# Patient Record
Sex: FEMALE | Race: WHITE | NOT HISPANIC OR LATINO | Employment: OTHER | URBAN - METROPOLITAN AREA
[De-identification: names, ages, dates, MRNs, and addresses within clinical notes are randomized per-mention and may not be internally consistent; named-entity substitution may affect disease eponyms.]

---

## 2020-02-10 ENCOUNTER — RECORDS - HEALTHEAST (OUTPATIENT)
Dept: LAB | Facility: CLINIC | Age: 75
End: 2020-02-10

## 2020-02-12 LAB — BACTERIA SPEC CULT: NORMAL

## 2021-02-27 ENCOUNTER — COMMUNICATION - HEALTHEAST (OUTPATIENT)
Dept: SCHEDULING | Facility: CLINIC | Age: 76
End: 2021-02-27

## 2021-05-25 ENCOUNTER — RECORDS - HEALTHEAST (OUTPATIENT)
Dept: ADMINISTRATIVE | Facility: CLINIC | Age: 76
End: 2021-05-25

## 2021-06-16 PROBLEM — R42 VERTIGO: Status: ACTIVE | Noted: 2021-02-26

## 2022-05-13 ENCOUNTER — HOSPITAL ENCOUNTER (OUTPATIENT)
Dept: ULTRASOUND IMAGING | Facility: CLINIC | Age: 77
Discharge: HOME | End: 2022-05-13
Attending: FAMILY MEDICINE | Admitting: FAMILY MEDICINE
Payer: COMMERCIAL

## 2022-05-13 DIAGNOSIS — M79.605 LEG PAIN, POSTERIOR, LEFT: ICD-10-CM

## 2022-05-13 PROCEDURE — 93971 EXTREMITY STUDY: CPT | Mod: LT

## 2022-10-27 ENCOUNTER — LAB REQUISITION (OUTPATIENT)
Dept: LAB | Facility: CLINIC | Age: 77
End: 2022-10-27
Payer: COMMERCIAL

## 2022-10-27 DIAGNOSIS — Z01.419 ENCOUNTER FOR GYNECOLOGICAL EXAMINATION (GENERAL) (ROUTINE) WITHOUT ABNORMAL FINDINGS: ICD-10-CM

## 2022-10-27 LAB
ALBUMIN SERPL BCG-MCNC: 4.7 G/DL (ref 3.5–5.2)
ALP SERPL-CCNC: 95 U/L (ref 35–104)
ALT SERPL W P-5'-P-CCNC: 19 U/L (ref 10–35)
ANION GAP SERPL CALCULATED.3IONS-SCNC: 14 MMOL/L (ref 7–15)
AST SERPL W P-5'-P-CCNC: 23 U/L (ref 10–35)
BILIRUB SERPL-MCNC: 0.3 MG/DL
BUN SERPL-MCNC: 19.4 MG/DL (ref 8–23)
CALCIUM SERPL-MCNC: 9.7 MG/DL (ref 8.8–10.2)
CHLORIDE SERPL-SCNC: 104 MMOL/L (ref 98–107)
CREAT SERPL-MCNC: 0.71 MG/DL (ref 0.51–0.95)
DEPRECATED HCO3 PLAS-SCNC: 23 MMOL/L (ref 22–29)
ERYTHROCYTE [DISTWIDTH] IN BLOOD BY AUTOMATED COUNT: 13.2 % (ref 10–15)
GFR SERPL CREATININE-BSD FRML MDRD: 88 ML/MIN/1.73M2
GLUCOSE SERPL-MCNC: 123 MG/DL (ref 70–99)
HCT VFR BLD AUTO: 45.2 % (ref 35–47)
HGB BLD-MCNC: 14.4 G/DL (ref 11.7–15.7)
MCH RBC QN AUTO: 28.3 PG (ref 26.5–33)
MCHC RBC AUTO-ENTMCNC: 31.9 G/DL (ref 31.5–36.5)
MCV RBC AUTO: 89 FL (ref 78–100)
PLATELET # BLD AUTO: 250 10E3/UL (ref 150–450)
POTASSIUM SERPL-SCNC: 4.7 MMOL/L (ref 3.4–5.3)
PROT SERPL-MCNC: 7.4 G/DL (ref 6.4–8.3)
RBC # BLD AUTO: 5.09 10E6/UL (ref 3.8–5.2)
SODIUM SERPL-SCNC: 141 MMOL/L (ref 136–145)
WBC # BLD AUTO: 7.7 10E3/UL (ref 4–11)

## 2022-10-27 PROCEDURE — 80053 COMPREHEN METABOLIC PANEL: CPT | Mod: ORL | Performed by: OBSTETRICS & GYNECOLOGY

## 2022-10-27 PROCEDURE — G0123 SCREEN CERV/VAG THIN LAYER: HCPCS | Mod: ORL | Performed by: OBSTETRICS & GYNECOLOGY

## 2022-10-27 PROCEDURE — 85027 COMPLETE CBC AUTOMATED: CPT | Mod: ORL | Performed by: OBSTETRICS & GYNECOLOGY

## 2022-11-01 LAB
BKR LAB AP GYN ADEQUACY: NORMAL
BKR LAB AP GYN INTERPRETATION: NORMAL
BKR LAB AP HPV REFLEX: NORMAL
BKR LAB AP LMP: NORMAL
BKR LAB AP PREVIOUS ABNL DX: NORMAL
BKR LAB AP PREVIOUS ABNORMAL: NORMAL
PATH REPORT.COMMENTS IMP SPEC: NORMAL
PATH REPORT.COMMENTS IMP SPEC: NORMAL
PATH REPORT.RELEVANT HX SPEC: NORMAL

## 2022-12-08 ENCOUNTER — LAB REQUISITION (OUTPATIENT)
Dept: LAB | Facility: CLINIC | Age: 77
End: 2022-12-08
Payer: COMMERCIAL

## 2022-12-08 DIAGNOSIS — K13.79 OTHER LESIONS OF ORAL MUCOSA: ICD-10-CM

## 2022-12-08 DIAGNOSIS — R73.09 OTHER ABNORMAL GLUCOSE: ICD-10-CM

## 2022-12-08 LAB
ALBUMIN SERPL BCG-MCNC: 4.9 G/DL (ref 3.5–5.2)
ALP SERPL-CCNC: 91 U/L (ref 35–104)
ALT SERPL W P-5'-P-CCNC: 25 U/L (ref 10–35)
ANION GAP SERPL CALCULATED.3IONS-SCNC: 16 MMOL/L (ref 7–15)
AST SERPL W P-5'-P-CCNC: 19 U/L (ref 10–35)
BILIRUB SERPL-MCNC: 0.4 MG/DL
BUN SERPL-MCNC: 16.9 MG/DL (ref 8–23)
CALCIUM SERPL-MCNC: 9.8 MG/DL (ref 8.8–10.2)
CHLORIDE SERPL-SCNC: 101 MMOL/L (ref 98–107)
CREAT SERPL-MCNC: 0.77 MG/DL (ref 0.51–0.95)
DEPRECATED HCO3 PLAS-SCNC: 26 MMOL/L (ref 22–29)
GFR SERPL CREATININE-BSD FRML MDRD: 80 ML/MIN/1.73M2
GLUCOSE SERPL-MCNC: 114 MG/DL (ref 70–99)
POTASSIUM SERPL-SCNC: 4.8 MMOL/L (ref 3.4–5.3)
PROT SERPL-MCNC: 7.1 G/DL (ref 6.4–8.3)
SODIUM SERPL-SCNC: 143 MMOL/L (ref 136–145)

## 2022-12-08 PROCEDURE — 80053 COMPREHEN METABOLIC PANEL: CPT | Mod: ORL | Performed by: PHYSICIAN ASSISTANT

## 2022-12-08 PROCEDURE — 87081 CULTURE SCREEN ONLY: CPT | Mod: ORL | Performed by: PHYSICIAN ASSISTANT

## 2022-12-10 LAB — BACTERIA SPEC CULT: NORMAL

## 2023-02-08 ENCOUNTER — ANCILLARY PROCEDURE (OUTPATIENT)
Dept: RADIOLOGY | Facility: CLINIC | Age: 78
End: 2023-02-08
Payer: COMMERCIAL

## 2023-02-13 ENCOUNTER — HOSPITAL ENCOUNTER (OUTPATIENT)
Dept: MAMMOGRAPHY | Facility: CLINIC | Age: 78
Discharge: HOME | End: 2023-02-13
Attending: OBSTETRICS & GYNECOLOGY
Payer: COMMERCIAL

## 2023-02-13 DIAGNOSIS — R92.8 OTHER ABNORMAL AND INCONCLUSIVE FINDINGS ON DIAGNOSTIC IMAGING OF BREAST: ICD-10-CM

## 2023-02-13 PROCEDURE — 77062 BREAST TOMOSYNTHESIS BI: CPT

## 2023-02-13 PROCEDURE — 76642 ULTRASOUND BREAST LIMITED: CPT | Mod: RT

## 2023-02-14 ENCOUNTER — TELEPHONE (OUTPATIENT)
Dept: MAMMOGRAPHY | Facility: CLINIC | Age: 78
End: 2023-02-14
Payer: COMMERCIAL

## 2023-02-14 NOTE — TELEPHONE ENCOUNTER
Addendum  2/15/23, 11:54am. Pt called today to ask if she should schedule surgery, radiation, chemo, body scans, MRIs, pre-op appt, or blood work now. I reassured pt that until biopsy is completed and results are available and pt sees breast surgeon, she has scheduled all appts needed at this time. I explained to pt we do not know at this point what all may be needed. I offered emotional support and encouragement to pt. Pt is anxious and wants to do anything she can proactively. Pt appreciative of my time. I spent over 15mins on this call.     Alba Crum, RN, BSN, [facility]      Addendum  2/14/23, 1:10pm. I informed pt that [facility] does not have an opening before 2/22/23. I also informed the pt Dr Cameron is fine with appt occurring on 2/22/23, Dr Cameron didn't want pt to wait until March for appt at [facility]. Pt appreciative of call and Dr Cameron's reassurance.     Alba Crum RN, BSN, [facility]      Addendum  2/14/23, 12:35pm, I called pt back to let her know I had not heard back from [facility], but in meantime I learned that [facility]'s first available appt is on Wed 2/22/23, arriving at 0845am for 0900 US bx and 10:00am stereo appt, Pt would like that appt so I conference called the  to join the call and appt was made.    Pt would still like to know if [facility] has sooner appt and reschedule to sooner appt if available. I will update [facility] on this conversation and inquire yet about sooner appt availability.     Pt appreciative of everyone's assistance.    Alba Crum RN, BSN, [facility]      2/14/23, 11:17am, Pt needs appts for Right breast US bx and left breast stereotactic bx. I was asked to contact pt today to assist in making appt with pt. Per Joe Lujan,  Tech, Dr Cameron recommends pt not wait until equipment available at [facility].      I spoke with pt and she would like to see how soon [facility] can see her for bx appts. Pt states her and jemal prefer driving back roads, not highways/freeways due to their age. Her second choice is [facility]. Pt states they live in [place], [place].     I will contact RN staff at  [facility] per pt request to check availability. Informed pt she may hear directly from [facility] to schedule or I will call her back. Pt verbalizes understanding and will await return phone call. Pt is eager to get this scheduled ASAP.    Alba Crum, RN, BSN, CBCN  [facility]

## 2023-02-16 ENCOUNTER — TELEPHONE (OUTPATIENT)
Dept: MAMMOGRAPHY | Facility: CLINIC | Age: 78
End: 2023-02-16
Payer: COMMERCIAL

## 2023-02-16 NOTE — TELEPHONE ENCOUNTER
Pt called and left a message asking if there's research that shows what causes breast cancer, soy in particular.     I called pt and advised her to discuss this with surgeon as well as there is controversy about this. Pt states for 30 years she has daily been eating Rye, Flaxseed, Soy yogurt, and eating cruciferous vegetables per her provider's advice to prevent breast cancer and she wonders if she's caused it by eating soy all these years. Pt reports she has been eating healthy all these years.     I reviewed the April 29, 2019 report below from [employer] with pt, but also encouraged pt to discuss with surgeon. Pt will decide if she will continue or not with soy yogurt until she meets with surgeon. I reassured pt that she has eaten healthy and done her best to reduce risk factors, but we don't always know what causes cancer to develop. Pt states she does not go online to research. I told her if she does to only go to reputable web sites, such as [employer].    Soy and Cancer Risk: Our Expert s Advice  Written by:  Komal Rubio  , News  April 29, 2019    There s a lot of conflicting information going around about soy: Is it healthy? Is it dangerous? And if it s OK to eat, why do some people say it isn t?    Some of the misunderstandings come from the fact that studies in people and studies in animals may show different results. In some animal studies, rodents that were exposed to high doses of compounds found in soy called isoflavones showed an increased risk of breast cancer. This is thought to be because the isoflavones in soy can act like estrogen in the body, and increased estrogen has been linked to certain types of breast cancer.    But rodents process soy differently from people, and the same results have not been seen in people. Also, doses of isoflavones in the animal studies are much higher than in humans. In fact, in human studies, the estrogen effects of soy seem to  either have no effect at all, or to reduce breast cancer risk (especially in  countries, where lifelong intake is higher than the [place]). This may be because the isoflavones can actually block the more potent natural estrogens in the blood.    So far, the evidence does not point to any dangers from eating soy in people, and the health benefits appear to outweigh any potential risk. In fact, there is growing evidence that eating traditional soy foods such as tofu, tempeh, edamame, miso, and soymilk may lower the risk of breast cancer, especially among  women. Soy foods are excellent sources of protein, especially when they replace other, less healthy foods such as animal fats and red or processed meats. Soy foods have been linked to lower rates of heart disease and may even help lower cholesterol.    According to Kailee Fu, ScD, RD, strategic director of nutritional epidemiology for the American [employer] Society, soy foods are healthy and safe. But she advises against taking soy supplements - which contain much higher isoflavone concentrations than food - until more research is done.    Reviewed by       The [employer] medical and editorial content team  Our team is made up of doctors and oncology certified nurses with deep knowledge of cancer care as well as journalists, editors, and translators with extensive experience in medical writing.     Pt appreciative of call back today and will discuss with Dr Garcia at 3/6/23 appt. I spent greater than 10mins on phone call today.    Alba Crum, RN, BSN, [facility]  [facility]

## 2023-02-22 ENCOUNTER — HOSPITAL ENCOUNTER (OUTPATIENT)
Dept: MAMMOGRAPHY | Facility: CLINIC | Age: 78
Discharge: HOME | End: 2023-02-22
Attending: OBSTETRICS & GYNECOLOGY
Payer: COMMERCIAL

## 2023-02-22 DIAGNOSIS — R92.8 OTHER ABNORMAL AND INCONCLUSIVE FINDINGS ON DIAGNOSTIC IMAGING OF BREAST: ICD-10-CM

## 2023-02-22 PROCEDURE — 88377 M/PHMTRC ALYS ISHQUANT/SEMIQ: CPT | Performed by: OBSTETRICS & GYNECOLOGY

## 2023-02-22 PROCEDURE — 250N000009 HC RX 250: Performed by: RADIOLOGY

## 2023-02-22 PROCEDURE — 88377 M/PHMTRC ALYS ISHQUANT/SEMIQ: CPT | Mod: 26 | Performed by: MEDICAL GENETICS

## 2023-02-22 PROCEDURE — 19083 BX BREAST 1ST LESION US IMAG: CPT | Mod: RT

## 2023-02-22 PROCEDURE — 999N000065 MA POST PROCEDURE RIGHT

## 2023-02-22 PROCEDURE — 88305 TISSUE EXAM BY PATHOLOGIST: CPT | Mod: TC | Performed by: OBSTETRICS & GYNECOLOGY

## 2023-02-22 PROCEDURE — 250N000009 HC RX 250: Performed by: OBSTETRICS & GYNECOLOGY

## 2023-02-22 PROCEDURE — 272N000715 MA STEREOTACTIC BREAST BIOPSY VACUUM LT

## 2023-02-22 PROCEDURE — 999N000065 MA POST PROCEDURE LEFT

## 2023-02-22 RX ORDER — LIDOCAINE HYDROCHLORIDE AND EPINEPHRINE 10; 10 MG/ML; UG/ML
10 INJECTION, SOLUTION INFILTRATION; PERINEURAL ONCE
Status: COMPLETED | OUTPATIENT
Start: 2023-02-22 | End: 2023-02-22

## 2023-02-22 RX ORDER — LIDOCAINE HYDROCHLORIDE 10 MG/ML
5 INJECTION, SOLUTION INFILTRATION; PERINEURAL ONCE
Status: COMPLETED | OUTPATIENT
Start: 2023-02-22 | End: 2023-02-22

## 2023-02-22 RX ADMIN — LIDOCAINE HYDROCHLORIDE 5 ML: 10 INJECTION, SOLUTION INFILTRATION; PERINEURAL at 09:46

## 2023-02-22 RX ADMIN — LIDOCAINE HYDROCHLORIDE 5 ML: 10 INJECTION, SOLUTION INFILTRATION; PERINEURAL at 10:10

## 2023-02-22 RX ADMIN — LIDOCAINE HYDROCHLORIDE,EPINEPHRINE BITARTRATE 10 ML: 10; .01 INJECTION, SOLUTION INFILTRATION; PERINEURAL at 10:10

## 2023-02-24 PROCEDURE — 88360 TUMOR IMMUNOHISTOCHEM/MANUAL: CPT | Mod: 26 | Performed by: PATHOLOGY

## 2023-02-24 PROCEDURE — 88305 TISSUE EXAM BY PATHOLOGIST: CPT | Mod: 26 | Performed by: PATHOLOGY

## 2023-02-24 PROCEDURE — 88342 IMHCHEM/IMCYTCHM 1ST ANTB: CPT | Mod: 26 | Performed by: PATHOLOGY

## 2023-02-27 ENCOUNTER — TELEPHONE (OUTPATIENT)
Dept: MAMMOGRAPHY | Facility: CLINIC | Age: 78
End: 2023-02-27
Payer: COMMERCIAL

## 2023-02-27 LAB
PATH REPORT.ADDENDUM SPEC: ABNORMAL
PATH REPORT.ADDENDUM SPEC: ABNORMAL
PATH REPORT.COMMENTS IMP SPEC: ABNORMAL
PATH REPORT.COMMENTS IMP SPEC: YES
PATH REPORT.COMMENTS IMP SPEC: YES
PATH REPORT.FINAL DX SPEC: ABNORMAL
PATH REPORT.FINAL DX SPEC: ABNORMAL
PATH REPORT.GROSS SPEC: ABNORMAL
PATH REPORT.GROSS SPEC: ABNORMAL
PATH REPORT.MICROSCOPIC SPEC OTHER STN: ABNORMAL
PATH REPORT.MICROSCOPIC SPEC OTHER STN: ABNORMAL
PATH REPORT.RELEVANT HX SPEC: ABNORMAL
PATH REPORT.RELEVANT HX SPEC: ABNORMAL
PATHOLOGY SYNOPTIC REPORT: ABNORMAL
PATHOLOGY SYNOPTIC REPORT: ABNORMAL
PHOTO IMAGE: ABNORMAL
PHOTO IMAGE: ABNORMAL

## 2023-02-27 NOTE — TELEPHONE ENCOUNTER
Following review of pathology and imaging by Radiologist, Dr Frandy Viramontes, I telephoned patient to inform patient of malignant right breast pathology and benign left breast pathology from 2/22/23 breast biopsies performed at [facility]. We discussed breast anatomy, pathology findings, and Radiologist's recommendation for surgical and oncology consults. Receptor statuses and HER2 pending. Patient's questions were answered to the best of my ability.     Final Diagnosis   A.  Right breast, 1.6 cm mass, 12:00, 4.0 cm from nipple, ultrasound guided 14 gauge needle core biopsies:  - Invasive mammary (ductal) carcinoma:      - [place] grade:  2 (out of 3)      - Yamel score:  6 (out of 9)  (tubules-3; nuclear pleomorphism-2; mitoses-1)      - In situ carcinoma:  Pending IHC p63      - Estrogen and progesterone receptors:  Pending      - IHC HER2:  Pending     B.  Left breast, 6.9 cm segmental calcifications, 1:00-2:00, anterior-posterior depth, stereotactic 9 gauge vacuum assisted needle core biopsies:  - Benign breast tissue with :      - Focal columnar cell change and moderate usual ductal hyperplasia.      - Scattered intraductal microcalcifications:         Electronically signed by Ady Li MD on 2/24/2023 at  5:15 PM            Patient is scheduled to meet with breast surgeon, Dr Xenia Garcia, on Monday, 3/6/23, arriving at 0925 for 0940am appt, at [facility], [address] [place] [address], [phone]. Patient verbalized understanding of appointment details, location, and visitor policy of two people being allowed to accompany pt to appt. Appt with oncologist will be made at time of surgical consult appt or once pending receptors/HER2 is available.     I provided emotional support and encouragement. Pt verbalized understanding of information given and acceptance of plan. Calls welcomed.    I left courtesy message with Alaina  LPN, voicemail, at [facility] for ordering provider, Dr Pedro Pablo Wright.     Alba Crum, RN, BSN, [facility]  [facility]

## 2023-02-27 NOTE — PROGRESS NOTES
Pathology results from RIGHT and LEFT breast biopsies performed on 2/22/2023 revealed:     [facility]  Cordelia Thorne [id number]  F, 1945  Surgical Pathology Report (Final result) [id number]  Authorizing Provider: Ordering Provider:  Ordering Location: Collected: 02/22/2023 09:45 AM  Pathologist: Ady Li MD Received: 02/22/2023 12:02 PM  .  Specimens  A Breast, Right  B Breast, Left  .  .  Final Diagnosis  A. Right breast, 1.6 cm mass, 12:00, 4.0 cm from nipple, ultrasound guided 14 gauge needle core biopsies:  - Invasive mammary (ductal) carcinoma:  - Worth grade: 2 (out of 3)  - Worth score: 6 (out of 9) (tubules-3; nuclear pleomorphism-2; mitoses-1)  - In situ carcinoma: Pending IHC p63  - Estrogen and progesterone receptors: Pending  - IHC HER2: Pending  B. Left breast, 6.9 cm segmental calcifications, 1:00-2:00, anterior-posterior depth, stereotactic 9 gauge vacuum assisted  needle core biopsies:  - Benign breast tissue with :  - Focal columnar cell change and moderate usual ductal hyperplasia.  - Scattered intraductal microcalcifications:  Electronically signed by Ady Li MD on 2/24/2023 at 5:15 PM     Per review with Breast Center Radiologist, Dr. Frandy Viramontes, results are concordant with imaging findings.     Recommendation: Surgical and Oncology consultation for Right Breast     Results and Recommendations communicated to GABBIE Crum RN @ [facility] who will notify patient and coordinate follow up recommendations.        Bonnie Paul RN BSN  Procedure Nurse  [facility]  [phone]

## 2023-02-28 ENCOUNTER — TELEPHONE (OUTPATIENT)
Dept: MAMMOGRAPHY | Facility: CLINIC | Age: 78
End: 2023-02-28
Payer: COMMERCIAL

## 2023-02-28 LAB — INTERPRETATION: NORMAL

## 2023-02-28 NOTE — TELEPHONE ENCOUNTER
"Pt calling requesting review of malignant pathology results of right breast bx.     I had given pt 2/22/23 breast bx results on 2/27/23 and pt reports after thinking about our discussion regarding the pathology results, she had questions and is seeking review/definition of word \"invasive\". I spent more than 10 mins reviewing report again and defining \"invasive\" as it relates to this report with pt. I explained to pt that at this time we cannot tell by this pathology report if cancer has spread anywhere else in her body.     Pt reports concern that recent mammograms caused cancer to become \"invasive\" by the squeezing of breast tissue. I reiterated the definition of \"invasive\" as it relates to this pathology report. Provided reassurance.    Pt reports she will be seeing her dentist today for concerns of possible abscess in tooth. Pt is worried if it is an abscess how this will impact breast surgery. I advised pt to see her dentist today for diagnosis and treatment plan, and reassured her that the breast surgeon will be able to work/plan with recommendations made by dentist.     Provided emotional support and welcomed calls if pt has any further questions. Pt appreciative of my time spent on call with her today.    Alba Crum, RN, BSN, [facility]  [facility]  "

## 2023-02-28 NOTE — TELEPHONE ENCOUNTER
I called pt to give her the  ER/KY results from her right breast biopsy pathology report. Pt will discuss soy in her diet with breast surgeon for advice on continuing/discontinuing. Pt is asking what cancer treatment she will be receiving. I explained to pt that surgery pathology is needed to assist the oncologist in knowing best treatment options.        Final Diagnosis   A.  Right breast, 1.6 cm mass, 12:00, 4.0 cm from nipple, ultrasound guided 14 gauge needle core biopsies:  - Invasive mammary (ductal) carcinoma:      - Yamel grade:  2 (out of 3)      - [place] score:  6 (out of 9)  (tubules-3; nuclear pleomorphism-2; mitoses-1)      - In situ carcinoma:  Pending IHC p63      - Estrogen and progesterone receptors:  Pending      - IHC HER2:  Pending     B.  Left breast, 6.9 cm segmental calcifications, 1:00-2:00, anterior-posterior depth, stereotactic 9 gauge vacuum assisted needle core biopsies:  - Benign breast tissue with :      - Focal columnar cell change and moderate usual ductal hyperplasia.      - Scattered intraductal microcalcifications:         Electronically signed by Ady Li MD on 2/24/2023 at  5:15 PM   Comment     Intradepartmental consultation concurs with the diagnoses on specimens A and B.       Synoptic Checklist   Breast Biomarker Reporting Template  Protocol posted: 6/22/2022  (Added in Addendum) BREAST: BIOMARKER REPORTING TEMPLATE - A  Test(s) Performed     Estrogen Receptor (ER) Status  Positive (greater than 10% of cells demonstrate nuclear positivity)    Percentage of Cells with Nuclear Positivity  100 %   Average Intensity of Staining  Strong    Test Type  Food and Drug Administration (FDA) cleared (test / vendor): Center Line    Primary Antibody  SP1    Scoring System  No separate scoring system used    Test(s) Performed     Progesterone Receptor (PgR) Status  Positive    Percentage of Cells with Nuclear Positivity  11-20%    Average Intensity of Staining  Strong    Test  Type  Food and Drug Administration (FDA) cleared (test / vendor): [employer]    Primary Antibody  1E2    Scoring System  No separate scoring system used    Test(s) Performed     HER2 by Immunohistochemistry  Equivocal (Score 2+)    Percentage of Cells with Uniform Intense Complete Membrane Staining  50 %   Test Type  Food and Drug Administration (FDA) cleared (test / vendor): [employer]    Primary Antibody  4B5    Cold Ischemia and Fixation Times  Meet requirements specified in latest version of the ASCO / CAP Guidelines    Cold Ischemia Time (minutes)  5 min   Fixation Time (hours)  17.3 hours   Testing Performed on Block Number(s)  [id number]    METHODS   Fixative  Formalin    Image Analysis  Not performed    Comment(s)  FISH HER2 PENDING    .             Pt appreciative of call. I welcomed calls if pt has any questions. Pt aware that HER2 is still pending.     Alba Crum, RN, BSN, [facility]

## 2023-03-02 ENCOUNTER — TELEPHONE (OUTPATIENT)
Dept: MAMMOGRAPHY | Facility: CLINIC | Age: 78
End: 2023-03-02
Payer: COMMERCIAL

## 2023-03-02 NOTE — TELEPHONE ENCOUNTER
Pt calling, and asking for appt details with Dr Garcia to confirm time since she has not received reminder call for Monday, 3/6/23 appt.     I confirmed for and reassured pt that her appt is on Monday, 3/6/23, with Dr Garcia at [facility] [address]. Pt is to arrive for check in at 0925am, for 0940am appt. I gave pt address again to location. Pt verbalizes understanding of appt date, time, and location.    Pt also reports she is having a tooth extracted tomorrow, 3/3/23, by her oral surgeon, after learning of a cracked tooth. Pt states her tooth pain was not an abscess and she didn't need a root canal, but the oral surgeon will extract the tooth due to the crack in it.     Pt appreciative of my time and confirmation of appt, and for the kindness she has received.      Alba Crum, RN, BSN, CBCN  [facility]

## 2023-03-02 NOTE — TELEPHONE ENCOUNTER
Pt calling.    Pt reports she had some breast discomfort with mammogram and biopsy and she noticed when she'd reach up into cupboard there was pain in her right breast, however, that hasn't happened the past two days. Patient's concern now is that the cancer has spread from the biopsy site and was causing the pain in her breast.     I reassured pt that is sounds like muscle or tissue was being pulled causing the pain when she was reaching high into cupboard since pt denies there was pain at other times, only when reaching, and she's had no pain past two days. I encouraged her to discuss concerns of cancer spreading due to biopsy with the breast surgeon on Monday.    Pt appreciative of my time and reassurance.     Alba Crum, RN, BSN, [facility]  [facility] [place]

## 2023-03-05 NOTE — PROGRESS NOTES
History:  This is a 77 year old female who I'm asked to see by Dr. Calderón for evaluation of a right breast cancer.  She presents with her , Raul.  This was picked up by screening mammogram.  She had not had a mammogram since about 67 years old.  The mammogram was performed because she thought that the right side of her breast looked a bit more round compared to the left.  Neither herself nor her primary could palpate any masses.  She denies any other new breast symptoms such as pain, or nipple discharge.  She then underwent diagnostic imaging for a right-sided mass and left-sided microcalcifications.  A needle biopsy was then done on each side.  The left side demonstrated benign changes.  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  It is estrogen receptor positive, progesterone receptor positive, and HER-2 negative.    Past medical history:  Denies    Past surgical history:  Tonsillectomy and adenoidectomy    Medications:     Ascorbic Acid (VITAMIN C) 500 MG CAPS, , Disp: , Rfl:      cholecalciferol, vitamin D3, 1,000 unit (25 mcg) tablet, [CHOLECALCIFEROL, VITAMIN D3, 1,000 UNIT (25 MCG) TABLET] Take 1,000 Units by mouth daily., Disp: , Rfl:      FLAXSEED OIL ORAL, [FLAXSEED OIL ORAL] Take 1 tablet by mouth daily., Disp: , Rfl:      Lactobacillus rhamnosus GG (CULTURELLE) 15 billion cell CpSP, [LACTOBACILLUS RHAMNOSUS GG (CULTURELLE) 15 BILLION CELL CPSP] Take 1 capsule by mouth daily., Disp: , Rfl:      vitamin E 400 unit capsule, [VITAMIN E 400 UNIT CAPSULE] Take 400 Units by mouth daily., Disp: , Rfl:     Allergies:  Penicillins cause diarrhea    Social History:  Denies alcohol, tobacco, and illicit drug use.  Has 4 grandchildren.    Family History:  She has no family history of breast cancer or any other type of cancers in first or second-degree relatives.    Review of systems:  General ROS: No complaints or constitutional symptoms  Skin: No complaints or symptoms  "  Hematologic/Lymphatic: No symptoms or complaints  Psychiatric: No symptoms or complaints  Endocrine: No excessive fatigue, no hypermetabolic symptoms reported  Respiratory ROS: No cough, shortness of breath, or wheezing  Cardiovascular ROS: No chest pain or dyspnea on exertion  Breast ROS: Right breast fullness  Gastrointestinal ROS: No abdominal pain, nausea, diarrhea, or constipation  Musculoskeletal ROS: No recent injuries reported  Neurological ROS: No focal neurologic defects reported.      Physical exam:  Resp 16   Ht 1.676 m (5' 6\")   Wt 72.6 kg (160 lb)   BMI 25.82 kg/m    General: Alert, cooperative, appears stated age   Skin: Skin color, texture, turgor normal, no rashes or lesions   Lymphatic: No obvious adenopathy, no swelling   Eyes: No scleral icterus, pupils equal  HENT: No traumatic injury to the head or face, no gross abnormalities  Lungs: Normal respiratory effort, breath sounds equal bilaterally  Heart: Regular rate and rhythm  Breasts: No visible or palpable abnormality bilaterally.  Puncture site from biopsy clearly seen.  No palpable abnormality at 12:00 right breast.  Abdomen: Soft, non-distended and non-tender to palpation  Neurologic: Grossly intact    Imaging:  Pertinent images personally reviewed by myself and discussed with the patient.    Radiology reports:  EXAM: MA DIAGNOSTIC BILATERAL W/ CARIN, US BREAST RIGHT LIMITED 1-3 QUADRANTS  LOCATION: [facility]  DATE/TIME: 2/13/2023 10:15 AM     INDICATION: Abnormal screening mammogram. 77-year-old female presents for imaging follow-up for a right breast mass and left breast calcifications demonstrated on recent baseline screening mammogram.  COMPARISON: 2/8/2023     MAMMOGRAPHIC FINDINGS: Bilateral full-field, right breast spot compression, and left breast spot magnification digital diagnostic mammograms performed. There are scattered areas of fibroglandular density. Images evaluated with the assistance of CAD. " Breast tomosynthesis was used in interpretation. There is persistence of an irregular, spiculated mass within the right breast at the 12:00 position, middle depth. Recommend targeted right breast ultrasound for further evaluation. The remaining right breast parenchyma is unremarkable.  There are predominantly round and punctate calcifications in a segmental distribution within the left breast spanning from the 1:00 through 2:00 positions, anterior-posterior depth measuring approximately 2.9 x 6.9 x 6.1 cm in total dimension. The remaining left breast parenchyma is unremarkable.     ULTRASOUND FINDINGS:   Targeted right breast ultrasound at the 12:00 position, 4 cm from the nipple demonstrates a 1.1 x 0.9 x 1.6 cm irregular, hypoechoic mass with spiculated margins and associated architectural distortion. There is mild associated internal vascularity. This finding correlates with the mass demonstrated on mammography.  Sonographic evaluation of the right axilla demonstrates a few nonenlarged and morphologically normal lymph nodes demonstrating retained fatty rober and thin cortices.                                                                   IMPRESSION:   1.  Suspicious mass within the right breast at the 12:00 position correlates with the mammographic finding of concern. Recommend ultrasound-guided biopsy of this mass.  2.  No right axillary lymphadenopathy.  3.  Left breast calcifications in a segmental distribution from the 1:00-2:00 positions, anterior-posterior depth. Recommend a single site, representative stereotactic guided biopsy targeting the middle of this larger group of segmental calcifications   for definitive pathologic diagnosis.     ACR BI-RADS Category 5: Highly Suggestive of Malignancy.    My interpretation:  Large area of calcifications on the left.  Mass seen in right breast on mammogram and ultrasound    Pathology:  A.  Right breast, 1.6 cm mass, 12:00, 4.0 cm from nipple, ultrasound guided  14 gauge needle core biopsies:  - Invasive mammary (ductal) carcinoma:      - [place] grade:  2 (out of 3)      - [place] score:  6 (out of 9)  (tubules-3; nuclear pleomorphism-2; mitoses-1)      - In situ carcinoma:  Pending IHC p63      - Estrogen and progesterone receptors:  Pending      - IHC HER2:  Pending     B.  Left breast, 6.9 cm segmental calcifications, 1:00-2:00, anterior-posterior depth, stereotactic 9 gauge vacuum assisted needle core biopsies:  - Benign breast tissue with :      - Focal columnar cell change and moderate usual ductal hyperplasia.      - Scattered intraductal microcalcifications:    IMPRESSION:  Right breast invasive ductal carcinoma    - Grade II, T1, N0, ER/MS+, HER2-    PLAN:   Discussed the surgical options for treatment of breast cancer which generally are a lumpectomy (partial mastectomy) combined with radiation versus a mastectomy.  Explained that the survival benefit is the same for both.  The difference is in local recurrence risk.  The patient is a good candidate for a lumpectomy.  It would require preoperative wire localization since it is not palpable.  Discussed SLN biopsy.  The procedure and rationale were explained.  Discussed that at this point we do not know yet whether or not she will need chemotherapy and we may not know until we get all of the results of surgery back.  Sometimes the need for chemotherapy is dependent upon an Oncotype score.  Since the tumor is estrogen receptor positive, she will be a candidate for endocrine therapy.    After our discussion, all questions were answered to satisfaction.  She is very conflicted over our surgical options.  She ultimately would be worried to undergo a lumpectomy because of the radiation.  She is worried about having mastectomy because of the postoperative drain.  I discussed the typical day and postoperative course for both procedures.  She understands that breast procedures are performed outpatient with same-day  discharge.  The risks and benefits of surgery were explained.  Also talked about expected recovery time.  She would then follow-up with myself about 1-2 weeks after surgery to review final pathology and next steps.    In the end, she needs more time to think about the options.  Over an hour was spent in chart prep, discussion, and documentation with the patient.  A referral was placed to radiation oncology so she can receive more information to help her make a more informed decision.  She will give my office a call if she would like to chat more in person or if she has made a surgical decision.    Xenia Garcia DO  General Surgeon  [facility]  Breast [facility]  [address] [address]  Office: [phone]  Employed by - [facility]

## 2023-03-06 ENCOUNTER — TELEPHONE (OUTPATIENT)
Dept: SURGERY | Facility: CLINIC | Age: 78
End: 2023-03-06

## 2023-03-06 ENCOUNTER — PATIENT OUTREACH (OUTPATIENT)
Dept: ONCOLOGY | Facility: CLINIC | Age: 78
End: 2023-03-06

## 2023-03-06 ENCOUNTER — OFFICE VISIT (OUTPATIENT)
Dept: SURGERY | Facility: CLINIC | Age: 78
End: 2023-03-06
Attending: FAMILY MEDICINE
Payer: COMMERCIAL

## 2023-03-06 VITALS — BODY MASS INDEX: 25.71 KG/M2 | HEIGHT: 66 IN | WEIGHT: 160 LBS | RESPIRATION RATE: 16 BRPM

## 2023-03-06 DIAGNOSIS — C50.911 INVASIVE DUCTAL CARCINOMA OF BREAST, STAGE 1, RIGHT (H): Primary | ICD-10-CM

## 2023-03-06 PROCEDURE — G0463 HOSPITAL OUTPT CLINIC VISIT: HCPCS | Performed by: SURGERY

## 2023-03-06 PROCEDURE — 99204 OFFICE O/P NEW MOD 45 MIN: CPT | Performed by: SURGERY

## 2023-03-06 RX ORDER — MULTIVIT-MIN/IRON/FOLIC ACID/K 18-600-40
CAPSULE ORAL
COMMUNITY
End: 2023-03-15

## 2023-03-06 NOTE — LETTER
3/6/2023         RE: Cordelia Thorne  [address] [address]  [facility] [address]        Dear Colleague,    Thank you for referring your patient, Cordelia Thorne, to the [facility] [place]. Please see a copy of my visit note below.    History:  This is a 77 year old female who I'm asked to see by Dr. Calderón for evaluation of a right breast cancer.  She presents with her , Raul.  This was picked up by screening mammogram.  She had not had a mammogram since about 67 years old.  The mammogram was performed because she thought that the right side of her breast looked a bit more round compared to the left.  Neither herself nor her primary could palpate any masses.  She denies any other new breast symptoms such as pain, or nipple discharge.  She then underwent diagnostic imaging for a right-sided mass and left-sided microcalcifications.  A needle biopsy was then done on each side.  The left side demonstrated benign changes.  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  It is estrogen receptor positive, progesterone receptor positive, and HER-2 negative.    Past medical history:  Denies    Past surgical history:  Tonsillectomy and adenoidectomy    Medications:     Ascorbic Acid (VITAMIN C) 500 MG CAPS, , Disp: , Rfl:      cholecalciferol, vitamin D3, 1,000 unit (25 mcg) tablet, [CHOLECALCIFEROL, VITAMIN D3, 1,000 UNIT (25 MCG) TABLET] Take 1,000 Units by mouth daily., Disp: , Rfl:      FLAXSEED OIL ORAL, [FLAXSEED OIL ORAL] Take 1 tablet by mouth daily., Disp: , Rfl:      Lactobacillus rhamnosus GG (CULTURELLE) 15 billion cell CpSP, [LACTOBACILLUS RHAMNOSUS GG (CULTURELLE) 15 BILLION CELL CPSP] Take 1 capsule by mouth daily., Disp: , Rfl:      vitamin E 400 unit capsule, [VITAMIN E 400 UNIT CAPSULE] Take 400 Units by mouth daily., Disp: , Rfl:     Allergies:  Penicillins cause diarrhea    Social History:  Denies alcohol, tobacco, and illicit drug use.  Has 4 grandchildren.    Family  "History:  She has no family history of breast cancer or any other type of cancers in first or second-degree relatives.    Review of systems:  General ROS: No complaints or constitutional symptoms  Skin: No complaints or symptoms   Hematologic/Lymphatic: No symptoms or complaints  Psychiatric: No symptoms or complaints  Endocrine: No excessive fatigue, no hypermetabolic symptoms reported  Respiratory ROS: No cough, shortness of breath, or wheezing  Cardiovascular ROS: No chest pain or dyspnea on exertion  Breast ROS: Right breast fullness  Gastrointestinal ROS: No abdominal pain, nausea, diarrhea, or constipation  Musculoskeletal ROS: No recent injuries reported  Neurological ROS: No focal neurologic defects reported.      Physical exam:  Resp 16   Ht 1.676 m (5' 6\")   Wt 72.6 kg (160 lb)   BMI 25.82 kg/m    General: Alert, cooperative, appears stated age   Skin: Skin color, texture, turgor normal, no rashes or lesions   Lymphatic: No obvious adenopathy, no swelling   Eyes: No scleral icterus, pupils equal  HENT: No traumatic injury to the head or face, no gross abnormalities  Lungs: Normal respiratory effort, breath sounds equal bilaterally  Heart: Regular rate and rhythm  Breasts: No visible or palpable abnormality bilaterally.  Puncture site from biopsy clearly seen.  No palpable abnormality at 12:00 right breast.  Abdomen: Soft, non-distended and non-tender to palpation  Neurologic: Grossly intact    Imaging:  Pertinent images personally reviewed by myself and discussed with the patient.    Radiology reports:  EXAM: MA DIAGNOSTIC BILATERAL W/ CARIN, US BREAST RIGHT LIMITED 1-3 QUADRANTS  LOCATION: [facility]  DATE/TIME: 2/13/2023 10:15 AM     INDICATION: Abnormal screening mammogram. 77-year-old female presents for imaging follow-up for a right breast mass and left breast calcifications demonstrated on recent baseline screening mammogram.  COMPARISON: 2/8/2023     MAMMOGRAPHIC " FINDINGS: Bilateral full-field, right breast spot compression, and left breast spot magnification digital diagnostic mammograms performed. There are scattered areas of fibroglandular density. Images evaluated with the assistance of CAD. Breast tomosynthesis was used in interpretation. There is persistence of an irregular, spiculated mass within the right breast at the 12:00 position, middle depth. Recommend targeted right breast ultrasound for further evaluation. The remaining right breast parenchyma is unremarkable.  There are predominantly round and punctate calcifications in a segmental distribution within the left breast spanning from the 1:00 through 2:00 positions, anterior-posterior depth measuring approximately 2.9 x 6.9 x 6.1 cm in total dimension. The remaining left breast parenchyma is unremarkable.     ULTRASOUND FINDINGS:   Targeted right breast ultrasound at the 12:00 position, 4 cm from the nipple demonstrates a 1.1 x 0.9 x 1.6 cm irregular, hypoechoic mass with spiculated margins and associated architectural distortion. There is mild associated internal vascularity. This finding correlates with the mass demonstrated on mammography.  Sonographic evaluation of the right axilla demonstrates a few nonenlarged and morphologically normal lymph nodes demonstrating retained fatty rober and thin cortices.                                                                   IMPRESSION:   1.  Suspicious mass within the right breast at the 12:00 position correlates with the mammographic finding of concern. Recommend ultrasound-guided biopsy of this mass.  2.  No right axillary lymphadenopathy.  3.  Left breast calcifications in a segmental distribution from the 1:00-2:00 positions, anterior-posterior depth. Recommend a single site, representative stereotactic guided biopsy targeting the middle of this larger group of segmental calcifications   for definitive pathologic diagnosis.     ACR BI-RADS Category 5: Highly  Suggestive of Malignancy.    My interpretation:  Large area of calcifications on the left.  Mass seen in right breast on mammogram and ultrasound    Pathology:  A.  Right breast, 1.6 cm mass, 12:00, 4.0 cm from nipple, ultrasound guided 14 gauge needle core biopsies:  - Invasive mammary (ductal) carcinoma:      - [place] grade:  2 (out of 3)      - [place] score:  6 (out of 9)  (tubules-3; nuclear pleomorphism-2; mitoses-1)      - In situ carcinoma:  Pending IHC p63      - Estrogen and progesterone receptors:  Pending      - IHC HER2:  Pending     B.  Left breast, 6.9 cm segmental calcifications, 1:00-2:00, anterior-posterior depth, stereotactic 9 gauge vacuum assisted needle core biopsies:  - Benign breast tissue with :      - Focal columnar cell change and moderate usual ductal hyperplasia.      - Scattered intraductal microcalcifications:    IMPRESSION:  Right breast invasive ductal carcinoma    - Grade II, T1, N0, ER/CO+, HER2-    PLAN:   Discussed the surgical options for treatment of breast cancer which generally are a lumpectomy (partial mastectomy) combined with radiation versus a mastectomy.  Explained that the survival benefit is the same for both.  The difference is in local recurrence risk.  The patient is a good candidate for a lumpectomy.  It would require preoperative wire localization since it is not palpable.  Discussed SLN biopsy.  The procedure and rationale were explained.  Discussed that at this point we do not know yet whether or not she will need chemotherapy and we may not know until we get all of the results of surgery back.  Sometimes the need for chemotherapy is dependent upon an Oncotype score.  Since the tumor is estrogen receptor positive, she will be a candidate for endocrine therapy.    After our discussion, all questions were answered to satisfaction.  She is very conflicted over our surgical options.  She ultimately would be worried to undergo a lumpectomy because of the  radiation.  She is worried about having mastectomy because of the postoperative drain.  I discussed the typical day and postoperative course for both procedures.  She understands that breast procedures are performed outpatient with same-day discharge.  The risks and benefits of surgery were explained.  Also talked about expected recovery time.  She would then follow-up with myself about 1-2 weeks after surgery to review final pathology and next steps.    In the end, she needs more time to think about the options.  Over an hour was spent in chart prep, discussion, and documentation with the patient.  A referral was placed to radiation oncology so she can receive more information to help her make a more informed decision.  She will give my office a call if she would like to chat more in person or if she has made a surgical decision.    Xenia Garcia DO  General Surgeon  [facility]  Breast [address] [address]  Office: [phone]  Employed by - [facility]          Again, thank you for allowing me to participate in the care of your patient.        Sincerely,        Xenia Garcia DO

## 2023-03-06 NOTE — TELEPHONE ENCOUNTER
Patient called, wanted to clarify if she needs a pre op physical prior to her surgery with Dr. Garcia.  Per Dr. Garcia, yes, she does need a pre op.  Patient said she made an appointment to see her family doctor tomorrow.     She is awaiting the call to schedule her radiation oncology consult.  Reassured her that she will receive a call to schedule.  Support provided, invited calls.     (0) understands/communicates without difficulty

## 2023-03-06 NOTE — PROGRESS NOTES
New Patient Oncology Nurse Navigator Note     Referring provider:   Xenia Garcia DO [facility]     Referred to (specialty): Medical Oncology    Requested provider (if applicable):  [facility] location     Date Referral Received: 3/6/2023     Evaluation for : breast ca     Clinical History (per Nurse review of records provided):    **BOOK MARKED**   NOTES:  3/6/2023:  Dr. Garcia, surgeon consult    IMAGING:  Multiple in [facility] Epic    PATHOLOGY:  2/22/2023:  Addendum   Immunostain for p63 shows occasional small focus of nuclear grade 2 ductal carcinoma in situ, solid pattern with p63 positive myoepithelial cell layer.  The immunostain control reacts appropriately.     Addendum electronically signed by Ady Li MD on 2/27/2023 at 12:03 PM   Final Diagnosis   A.  Right breast, 1.6 cm mass, 12:00, 4.0 cm from nipple, ultrasound guided 14 gauge needle core biopsies:  - Invasive mammary (ductal) carcinoma:      - [place] grade:  2 (out of 3)      - [place] score:  6 (out of 9)  (tubules-3; nuclear pleomorphism-2; mitoses-1)      - In situ carcinoma:  Pending IHC p63      - Estrogen and progesterone receptors:  Pending      - IHC HER2:  Pending     B.  Left breast, 6.9 cm segmental calcifications, 1:00-2:00, anterior-posterior depth, stereotactic 9 gauge vacuum assisted needle core biopsies:  - Benign breast tissue with :      - Focal columnar cell change and moderate usual ductal hyperplasia.      - Scattered intraductal microcalcifications:         Electronically signed by Ady Li MD on 2/24/2023 at  5:15 PM         Clinical Assessment / Barriers to Care (Per Nurse): none noted       Records Location (Care Everywhere, Media, etc.): [facility]     Records Needed: none     Additional testing needed prior to consult: none

## 2023-03-06 NOTE — NURSING NOTE
"Marina  presents to [facility] for a surgical consult with Dr. Garcia  regarding her newly diagnosed right breast cancer.  She is accompanied by her husbnad for consult.  RN assessment and EMR update. Resp 16   Ht 1.676 m (5' 6\")   Wt 72.6 kg (160 lb)   BMI 25.82 kg/m    Patient given a Breast Cancer Packet, contents reviewed.  She met with Dr. Garcia  see dictation for details of visit. She will plan to think over her surgical options.  Dr. Garcia has ordered a pre op Rad Onc consult to help her make her decision.   Support provided, invited calls.  RN time 15 mins.  "

## 2023-03-06 NOTE — PROGRESS NOTES
MEDICAL RECORDS REQUEST   Radiation Oncology  [address] [place] [address]  PHONE: [phone]  Fax: [phone]        FUTURE VISIT INFORMATION                                                   Cordelia Thorne, : 1945 scheduled for future visit at [facility] Radiation Oncology    APPOINTMENT INFORMATION:    Date: 3/10/2023    Provider:  Dr. Cheyenne Jaimes     Reason for Visit/Diagnosis: pre-surgical planning, Invasive ductal carcinoma of breast, stage 1, right     REFERRAL INFORMATION:    Referring provider:  Xenia Garcia DO      RECORDS REQUESTED FOR VISIT                                                     Action    Action Taken 3/6/2023 4pm [id number]     I called pt Marina- all records are internal. She is having a pre-op appt with [employer] tomorrow 3/7/2023. No hx of rad onc.        SOFT TISSUE & BREAST     CT, MRI, PET/CT AND REPORTS yes   ANY RECENT LABS yes   SURGICAL REPORTS Yes - 2023  A.  Right breast, 1.6 cm mass, 12:00, 4.0 cm from nipple, ultrasound guided 14 gauge needle core biopsies:  - Invasive mammary (ductal) carcinoma   PATHOLOGY REPORTS yes   CURRENT MEDICATION LIST yes   *Send all radiation Prior radiation records for both [facility] and [facility] Radiation Oncology patients to [facility] with  ATTN: JAILENE/Jazmin Dosi/Physics

## 2023-03-07 ENCOUNTER — TELEPHONE (OUTPATIENT)
Dept: SURGERY | Facility: CLINIC | Age: 78
End: 2023-03-07
Payer: COMMERCIAL

## 2023-03-07 NOTE — TELEPHONE ENCOUNTER
Marina called, she is seeing Dr. Jaimes for a pre surgical radiation consult as she has many questions about having radiation.  She said she has pretty much decided she will be having a lumpectomy surgery thought, instead of mastectomy.  She's feeling this is the right decision for her.  She had some pre and post op routine questions, reviewed these with her to the best of my ability. Told her I will send a message to Dr. Garcia and Enrrique that she has made a decsion so they can start working on scheduling her surgery.  She said she has her pre op appointment today with her primary clinic. Support provided, invited calls.

## 2023-03-09 PROBLEM — C50.911 INVASIVE DUCTAL CARCINOMA OF BREAST, STAGE 1, RIGHT (H): Status: ACTIVE | Noted: 2023-03-09

## 2023-03-10 ENCOUNTER — PRE VISIT (OUTPATIENT)
Dept: RADIATION ONCOLOGY | Facility: HOSPITAL | Age: 78
End: 2023-03-10
Payer: COMMERCIAL

## 2023-03-10 ENCOUNTER — OFFICE VISIT (OUTPATIENT)
Dept: RADIATION ONCOLOGY | Facility: HOSPITAL | Age: 78
End: 2023-03-10
Attending: SURGERY
Payer: COMMERCIAL

## 2023-03-10 ENCOUNTER — TELEPHONE (OUTPATIENT)
Dept: SURGERY | Facility: CLINIC | Age: 78
End: 2023-03-10
Payer: COMMERCIAL

## 2023-03-10 VITALS
RESPIRATION RATE: 18 BRPM | BODY MASS INDEX: 26.13 KG/M2 | TEMPERATURE: 97.7 F | OXYGEN SATURATION: 99 % | HEART RATE: 64 BPM | WEIGHT: 161.9 LBS | SYSTOLIC BLOOD PRESSURE: 130 MMHG | DIASTOLIC BLOOD PRESSURE: 61 MMHG

## 2023-03-10 DIAGNOSIS — C50.911 INVASIVE DUCTAL CARCINOMA OF BREAST, STAGE 1, RIGHT (H): ICD-10-CM

## 2023-03-10 PROCEDURE — 99205 OFFICE O/P NEW HI 60 MIN: CPT | Performed by: STUDENT IN AN ORGANIZED HEALTH CARE EDUCATION/TRAINING PROGRAM

## 2023-03-10 PROCEDURE — 99417 PROLNG OP E/M EACH 15 MIN: CPT | Performed by: STUDENT IN AN ORGANIZED HEALTH CARE EDUCATION/TRAINING PROGRAM

## 2023-03-10 PROCEDURE — G0463 HOSPITAL OUTPT CLINIC VISIT: HCPCS | Performed by: STUDENT IN AN ORGANIZED HEALTH CARE EDUCATION/TRAINING PROGRAM

## 2023-03-10 ASSESSMENT — PAIN SCALES - GENERAL: PAINLEVEL: NO PAIN (0)

## 2023-03-10 NOTE — PROGRESS NOTES
[facility] Radiation Oncology Consult Note    Patient: Cordelia Thorne  MRN: [id number]  Date of Service: 03/10/2023    Assessment / Impression   77-year-old postmenopausal female with invasive ductal carcinoma of the right breast status postbiopsy    Invasive ductal carcinoma of breast, stage 1, right (H) [C50.911]   Cancer Staging   No matching staging information was found for the patient.          Plan:   Patient was considering lumpectomy versus mastectomy but has decided on proceeding with lumpectomy.  She is seen today to discuss adjuvant radiation therapy as part of breast conservation therapy.    Discussed adjuvant radiation therapy with patient and her .  The indications for, process of, alternatives, potential side effects and complications of RT to the right breast were discussed.    They asked multiple questions which were answered to their satisfaction and they verbalized understanding.      Patient had questions regarding treatment as well as cancer prevention which were discussed in detail.  Questions about potential environmental exposures and referred to environmental working group website which details a number of products which have been tested and chemicals to avoid.    She wishes to proceed with lumpectomy and sentinel lymph node biopsy and will return to clinic following to discuss details of final pathology and radiation recommendations.    Thank you for allowing me to participate in the care of this patient. Feel free to contact me with all questions or concerns.   Intent of Therapy: Curative  Patient on concurrent Herceptin No  Adjuvant hormonal therapy: TBD  Chemotherapy: TBD  Intended fractionation schedule: TBD      Breast cancer risk factors:   No obstetric history on file.  LMP Dates from Last 4 Encounters:   No data found for LMP        Side effects that may occur during or within weeks after radiation therapy      Fatigue and general weakness    Darkening, irritation,  itchiness, redness, dryness, erythema, peeling, scabbing, ulceration and contraction of the skin of the breast and chest    Swelling of the breast    Loss of armpit hair    Lung irritation    Decrease in appetite    Side effects that may occur months or years after radiation therapy      Development of another tumor or cancer    Thickening, telangiectasias (development of spider like blood vessels in the skin) and ulceration of the skin of the breast and chest    Firming, fibrosis (scar tissue), fat necrosis, and distortion of the breast    Poor healing after a trauma or surgery in the irradiated area    Nerve damage resulting in loss of arm strength and sensation    Coronary artery blockage causing angina pain or a heart attack    Lung inflammation of fibrosis causing cough, fever and shortness of breath    Fracture of the ribs    Swellingof an arm and hand    The risks, benefits and alternatives to radiation therapy were outlined with the patient. All questions were answered and a consent was signed.    Total time of this visit, including time spent face-to-face with patient and or via video/audio, and also in preparing for today's visit for MDM and documentation. Medical decision-making included consideration and possible diagnoses, management options, complex record review, review of diagnostic tests and information, consideration and discussion of significant complications based on comorbidities, discussion with providers involved in the care of the patient.     90 Minutes spent.       Subjective:      HPI: Cordelia Thorne is a 77 year old female with   Chief Complaint   Patient presents with     Oncology Clinic Visit     Breast Cancer     New Consult   She reports some delay in mammograms and previous recommendations that she might not need them however she felt like the right breast was more full than the left and pursued routine screening mammogram.    2/8/2023 screening mammogram: Is a 1.5 cm spiculated mass  within the right breast along the 12:00 radial.  On the left there is a loose grouping of nonspecific calcifications within the upper outer quadrant 5 cm from the nipple.  Diagnostic mammogram and targeted ultrasound recommended.  BI-RADS 0.    2/13/2023 diagnostic mammogram  Persistence of irregular spiculated mass within the right breast on o'clock position mild recommend targeted right breast ultrasound.  Probably round and punctate calcifications in the segmental distribution within the left breast brown in from the 1:00 to 2:00 positions, anterior posterior depth measuring 2.9 x 6.9 x 6.1 cm in total dimension.    Ultrasound: Right breast at the 12 o'clock position, 4 cm from the nipple demonstrates a 1.1 x 0.9 x 1.6 cm hypoechoic mass with spiculated margins and associated architectural distortion.  Right axilla evaluation demonstrates a few nonenlarged and morphologically normal lymph nodes  BI-RADS 5.    2/22/2023 ultrasound-guided core needle biopsy right breast  Invasive ductal carcinoma, grade 2, ER positive (100%), KS positive (10 to 20%), HER2/davon equivocal by IHC, negative by FISH    2/22/2023 stereotactic vacuum-assisted left breast biopsy  Benign breast tissue focal columnar cell change and moderate usual ductal hyperplasia    She met with Dr. Garcia to discuss surgical options.  She is leaning towards lumpectomy with sentinel lymph node biopsy and wanted to hear more about radiation therapy as part of breast conservation treatment.      She is concerned about cause of her breast cancer.  Discussed both genetic and environmental components to cancer.  She is concerned that her delay in routine screening mammograms based on prior recommendations from others led to a delay in her breast cancer diagnosis.  She has some concerns regarding side effects of treatment for her breast cancer.  Overall she is active and independent.    CHEMOTHERAPY HISTORY: Concurrent Chemotherapy: No    RADIATION THERAPY  HISTORY: Prior Radiation: No    IMPLANTED CARDIAC DEVICE: none     PREGNANCY: n/a  The patient is a postmenopausal female    Current Outpatient Medications   Medication Sig Dispense Refill     Lactobacillus rhamnosus GG (CULTURELLE) 15 billion cell CpSP [LACTOBACILLUS RHAMNOSUS GG (CULTURELLE) 15 BILLION CELL CPSP] Take 1 capsule by mouth daily.       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, see notes, advised treatment     Past Surgical History:   Procedure Laterality Date     LUMPECTOMY BREAST Right 3/16/2023    Procedure: BREAST WIRE LOCALIZED LUMPECTOMY WITH SENTINEL LYMPH NODE BIOPSY;  Surgeon: Xenia Garcia DO;  Location: [facility]     TONSILLECTOMY & ADENOIDECTOMY       Amoxicillin, Monosodium glutamate, and Penicillins  Family History   Problem Relation Age of Onset     Heart Disease Mother      Social History     Socioeconomic History     Marital status:      Spouse name: Not on file     Number of children: Not on file     Years of education: Not on file     Highest education level: Not on file   Occupational History     Not on file   Tobacco Use     Smoking status: Never     Smokeless tobacco: Never   Substance and Sexual Activity     Alcohol use: Not on file     Drug use: Never     Sexual activity: Not on file   Other Topics Concern     Not on file   Social History Narrative     Not on file     Social Determinants of Health     Financial Resource Strain: Not on file   Food Insecurity: Not on file   Transportation Needs: Not on file   Physical Activity: Not on file   Stress: Not on file   Social Connections: Not on file   Intimate Partner Violence: Not on file   Housing Stability: Not on file        Review of Systems:        General     EENT     Respiratory               Cardiovascular     Endocrine     Gastrointestinal  Gastrointestinal  Gastrointestinal (WDL): All gastrointestinal elements are within defined limits  Musculoskeletal     Integumentary                  Integumentary  Integumentary (WDL): All integumentary elements are within defined limits  Neurological     Psychological/Emotional      Hematological/Lymphatic     Dermatologic     Genitourinary/Reproductive     Reproductive     Pain              Pain Score: No Pain (0)   AUA Assessment                     Accompanied by  Accompanied By: spouse      Objective:     Physical Exam    Vitals:    03/10/23 1329   BP: 130/61   Pulse: 64   Resp: 18   Temp: 97.7  F (36.5  C)   SpO2: 99%   Weight: 73.4 kg (161 lb 14.4 oz)   PainSc: No Pain (0)       Gen: Alert, in NAD pleasant interactive, well nourished  Eyes: EOMI, sclera anicteric  HENT     Head: NC/AT  Pulm: No wheezing, stridor or respiratory distress  Chest: Exam deferred today until postlumpectomy  Musculoskeletal: Normal muscle bulk and tone  Skin: Normal tone and turgor, wearing surgical gloves  Neurologic: A/Ox3, speech fluent, no focal motor deficits, gait normal and unaided  Psychiatric: Appropriate mood and affect       Recent Labs: No results found for this or any previous visit (from the past 168 hour(s)).    Imaging: Imaging results 6 weeks:MA Breast Specimen Right    Result Date: 3/16/2023  INDICATION: Pre-operative localization of invasive ductal carcinoma at 12 o'clock, 4 cm from the nipple. PROCEDURE: Informed consent was obtained from the patient. The breast was cleansed with ChloraPrep. Lidocaine was used for local anesthesia. A -gauge needle was then advanced to the area of abnormality. A localization wire was then deployed. 531uCi Tc99m lymphoseek was injected subdermally along the upper outer aspect of the areolar margin. Post-procedure mammograms demonstrate the localization wire in appropriate position. The previously placed marker was  visualized. The patient tolerated this well.    IMPRESSION: Sonographically guided  wire localization. A specimen was sent for radiography. Specimen radiograph demonstrates the area of abnormality and the localization wire to be included in the specimen.     MA Stereotactic Breast Biopsy Vacuum Assist Left    Addendum Date: 2/28/2023    Pathology Results: 1. Ultrasound guided biopsy of the right breast mass at 12:00: Invasive ductal carcinoma. 2. Stereotactic guided biopsy of the left breast calcifications upper outer quadrant: Benign breast tissue. Focal columnar changes. Scattered intraductal microcalcifications. Please see full pathology report for further details. Results are concordant with imaging findings at both sites. Recommendation: Surgical/oncology consult for the right breast malignancy. PAMELA CHAPMAN MD   SYSTEM ID:  [id number]    Result Date: 2/28/2023  Stereotactic biopsy of the left breast. Comparisons: Mammogram dated 2/13/2023. HISTORY:    Calcifications requiring biopsy. FINDINGS: Procedure, risks, benefits and alternatives were discussed with the patient and the patient gave written and verbal consent. The patient was positioned for stereotactic biopsy. Aseptic technique was utilized. 1% lidocaine was used for skin anesthesia and 15 cc of 1% lidocaine with epinephrine were utilized for deep tissue anesthesia. Using stereotactic technique and an 9 gauge vacuum assisted biopsy needle, multiple specimens were obtained and images were archived. Less than 5 mL blood loss. Calcifications are seen in the biopsy specimen. Location: Left breast upper-outer quadrant Marker: HydroMark Pressure was held over this area for approximately 15 minutes. A dressing was placed and care instructions were discussed with the patient. Post biopsy mammogram demonstrates marker deployment 6 mm superior to the residual calcifications.     IMPRESSION:  1. Uncomplicated stereotactic vacuum assisted core needle biopsy of the  left breast. 2. Same-day ultrasound-guided biopsy of the right breast was also performed, dictated separately. PAMELA CHAPMAN MD   SYSTEM ID:  [id number]    MA Post Procedure Left    Addendum Date: 2/28/2023    Pathology Results: 1. Ultrasound guided biopsy of the right breast mass at 12:00: Invasive ductal carcinoma. 2. Stereotactic guided biopsy of the left breast calcifications upper outer quadrant: Benign breast tissue. Focal columnar changes. Scattered intraductal microcalcifications. Please see full pathology report for further details. Results are concordant with imaging findings at both sites. Recommendation: Surgical/oncology consult for the right breast malignancy. PAMELA CHAPMAN MD   SYSTEM ID:  [id number]    Result Date: 2/28/2023  Stereotactic biopsy of the left breast. Comparisons: Mammogram dated 2/13/2023. HISTORY:    Calcifications requiring biopsy. FINDINGS: Procedure, risks, benefits and alternatives were discussed with the patient and the patient gave written and verbal consent. The patient was positioned for stereotactic biopsy. Aseptic technique was utilized. 1% lidocaine was used for skin anesthesia and 15 cc of 1% lidocaine with epinephrine were utilized for deep tissue anesthesia. Using stereotactic technique and an 9 gauge vacuum assisted biopsy needle, multiple specimens were obtained and images were archived. Less than 5 mL blood loss. Calcifications are seen in the biopsy specimen. Location: Left breast upper-outer quadrant Marker: HydroMark Pressure was held over this area for approximately 15 minutes. A dressing was placed and care instructions were discussed with the patient. Post biopsy mammogram demonstrates marker deployment 6 mm superior to the residual calcifications.     IMPRESSION:  1. Uncomplicated stereotactic vacuum assisted core needle biopsy of the left breast. 2. Same-day ultrasound-guided biopsy of the right breast was also performed, dictated separately. PAMELA CHAPMAN  MD   SYSTEM ID:  [id number]    MA Post Procedure Right    Result Date: 3/16/2023  INDICATION: Pre-operative localization of invasive ductal carcinoma at 12 o'clock, 4 cm from the nipple. PROCEDURE: Informed consent was obtained from the patient. The breast was cleansed with ChloraPrep. Lidocaine was used for local anesthesia. A -gauge needle was then advanced to the area of abnormality. A localization wire was then deployed. 531uCi Tc99m lymphoseek was injected subdermally along the upper outer aspect of the areolar margin. Post-procedure mammograms demonstrate the localization wire in appropriate position. The previously placed marker was visualized. The patient tolerated this well.    IMPRESSION: Sonographically guided  wire localization. A specimen was sent for radiography. Specimen radiograph demonstrates the area of abnormality and the localization wire to be included in the specimen.     MA Post Procedure Right    Result Date: 2/22/2023  Ultrasound guided right breast biopsy. Comparisons: 2/13/2023 FINDINGS: Procedure, risks, benefits and alternatives were discussed with the patient and the patient gave written and verbal consent. Aseptic technique was utilized. 1% lidocaine was utilized for local anesthesia. Under ultrasound guidance, biopsy of mass was performed and marker placed as follows and images were archived: Size: 14 gauge core needle biopsy system Number of cores: 4 Position: Right breast 12:00 4 cm from the nipple Marker: HydroMark Less than 5 mL blood loss. Pressure was held over this area for approximately 10 minutes. A dressing was placed and care instructions were discussed with the patient. Post biopsy mammogram demonstrates clip deployment.     IMPRESSION:  1. Uncomplicated ultrasound guided core needle biopsy of the right breast. 2. Same-day stereotactic guided biopsy of the left breast calcifications was also performed later today. Please refer to dedicated report for details. PAMELA CHAPMAN  MD   SYSTEM ID:  [id number]    US Breast Right Limited 1-3 Quadrants    Result Date: 2/13/2023  EXAM: MA DIAGNOSTIC BILATERAL W/ CARIN, US BREAST RIGHT LIMITED 1-3 QUADRANTS LOCATION: [facility] DATE/TIME: 2/13/2023 10:15 AM INDICATION: Abnormal screening mammogram. 77-year-old female presents for imaging follow-up for a right breast mass and left breast calcifications demonstrated on recent baseline screening mammogram. COMPARISON: 2/8/2023 MAMMOGRAPHIC FINDINGS: Bilateral full-field, right breast spot compression, and left breast spot magnification digital diagnostic mammograms performed. There are scattered areas of fibroglandular density. Images evaluated with the assistance of CAD. Breast tomosynthesis was used in interpretation. There is persistence of an irregular, spiculated mass within the right breast at the 12:00 position, middle depth. Recommend targeted right breast ultrasound for further evaluation. The remaining right breast parenchyma is unremarkable. There are predominantly round and punctate calcifications in a segmental distribution within the left breast spanning from the 1:00 through 2:00 positions, anterior-posterior depth measuring approximately 2.9 x 6.9 x 6.1 cm in total dimension. The remaining left breast parenchyma is unremarkable. ULTRASOUND FINDINGS: Targeted right breast ultrasound at the 12:00 position, 4 cm from the nipple demonstrates a 1.1 x 0.9 x 1.6 cm irregular, hypoechoic mass with spiculated margins and associated architectural distortion. There is mild associated internal vascularity. This  finding correlates with the mass demonstrated on mammography. Sonographic evaluation of the right axilla demonstrates a few nonenlarged and morphologically normal lymph nodes demonstrating retained fatty rober and thin cortices.     IMPRESSION: 1.  Suspicious mass within the right breast at the 12:00 position correlates with the mammographic finding of concern.  Recommend ultrasound-guided biopsy of this mass. 2.  No right axillary lymphadenopathy. 3.  Left breast calcifications in a segmental distribution from the 1:00-2:00 positions, anterior-posterior depth. Recommend a single site, representative stereotactic guided biopsy targeting the middle of this larger group of segmental calcifications for definitive pathologic diagnosis. ACR BI-RADS Category 5: Highly Suggestive of Malignancy. I personally discussed the findings and recommendations with the patient at the conclusion of the examination.     US Breast Biopsy Core Needle Right    Addendum Date: 2/28/2023    Pathology Results: 1. Ultrasound guided biopsy of the right breast mass at 12:00: Invasive ductal carcinoma. 2. Stereotactic guided biopsy of the left breast calcifications upper outer quadrant: Benign breast tissue. Focal columnar changes. Scattered intraductal microcalcifications. Please see full pathology report for further details. Results are concordant with imaging findings at both sites. Recommendation: Surgical/oncology consult for the right breast malignancy. PAMELA CHAPMAN MD   SYSTEM ID:  [id number]    Result Date: 2/28/2023  Ultrasound guided right breast biopsy. Comparisons: 2/13/2023 FINDINGS: Procedure, risks, benefits and alternatives were discussed with the patient and the patient gave written and verbal consent. Aseptic technique was utilized. 1% lidocaine was utilized for local anesthesia. Under ultrasound guidance, biopsy of mass was performed and marker placed as follows and images were archived: Size: 14 gauge core needle biopsy system Number of cores: 4 Position: Right breast 12:00 4 cm from the nipple Marker: HydroMark Less than 5 mL blood loss. Pressure was held over this area for approximately 10 minutes. A dressing was placed and care instructions were discussed with the patient. Post biopsy mammogram demonstrates clip deployment.     IMPRESSION:  1. Uncomplicated ultrasound guided core  needle biopsy of the right breast. 2. Same-day stereotactic guided biopsy of the left breast calcifications was also performed later today. Please refer to dedicated report for details. PAMELA CHAPMAN MD   SYSTEM ID:  [id number]    MA Diagnostic Bilateral w/Carin    Result Date: 2/13/2023  EXAM: MA DIAGNOSTIC BILATERAL W/ CARIN, US BREAST RIGHT LIMITED 1-3 QUADRANTS LOCATION: [facility] DATE/TIME: 2/13/2023 10:15 AM INDICATION: Abnormal screening mammogram. 77-year-old female presents for imaging follow-up for a right breast mass and left breast calcifications demonstrated on recent baseline screening mammogram. COMPARISON: 2/8/2023 MAMMOGRAPHIC FINDINGS: Bilateral full-field, right breast spot compression, and left breast spot magnification digital diagnostic mammograms performed. There are scattered areas of fibroglandular density. Images evaluated with the assistance of CAD. Breast tomosynthesis was used in interpretation. There is persistence of an irregular, spiculated mass within the right breast at the 12:00 position, middle depth. Recommend targeted right breast ultrasound for further evaluation. The remaining right breast parenchyma is unremarkable. There are predominantly round and punctate calcifications in a segmental distribution within the left breast spanning from the 1:00 through 2:00 positions, anterior-posterior depth measuring approximately 2.9 x 6.9 x 6.1 cm in total dimension. The remaining left breast parenchyma is unremarkable. ULTRASOUND FINDINGS: Targeted right breast ultrasound at the 12:00 position, 4 cm from the nipple demonstrates a 1.1 x 0.9 x 1.6 cm irregular, hypoechoic mass with spiculated margins and associated architectural distortion. There is mild associated internal vascularity. This  finding correlates with the mass demonstrated on mammography. Sonographic evaluation of the right axilla demonstrates a few nonenlarged and morphologically normal lymph nodes  demonstrating retained fatty rober and thin cortices.     IMPRESSION: 1.  Suspicious mass within the right breast at the 12:00 position correlates with the mammographic finding of concern. Recommend ultrasound-guided biopsy of this mass. 2.  No right axillary lymphadenopathy. 3.  Left breast calcifications in a segmental distribution from the 1:00-2:00 positions, anterior-posterior depth. Recommend a single site, representative stereotactic guided biopsy targeting the middle of this larger group of segmental calcifications for definitive pathologic diagnosis. ACR BI-RADS Category 5: Highly Suggestive of Malignancy. I personally discussed the findings and recommendations with the patient at the conclusion of the examination.     MA External Imaging 3D Screening    Result Date: 2/9/2023  Images were obtained from an external facility.  Click PACS Images hyperlink to view images.  Textual results have been scanned into the media tab.    Image Guided Breast Localization w Sent Node Inj Right    Result Date: 3/16/2023  INDICATION: Pre-operative localization of invasive ductal carcinoma at 12 o'clock, 4 cm from the nipple. PROCEDURE: Informed consent was obtained from the patient. The breast was cleansed with ChloraPrep. Lidocaine was used for local anesthesia. A -gauge needle was then advanced to the area of abnormality. A localization wire was then deployed. 531uCi Tc99m lymphoseek was injected subdermally along the upper outer aspect of the areolar margin. Post-procedure mammograms demonstrate the localization wire in appropriate position. The previously placed marker was visualized. The patient tolerated this well.    IMPRESSION: Sonographically guided  wire localization. A specimen was sent for radiography. Specimen radiograph demonstrates the area of abnormality and the localization wire to be included in the specimen.       Pathology:   No results found for this or any previous visit (from the past 6215  hour(s)).  Pathology Results       Benign - Stereotactic Biopsy, Left - 2/22/2023      Pathology Code Malignancy Type    Ductal hyperplasia, Usual     Benign Calcifications           Additional Information            Concordance: Concordant           External: No              Malignant - Ultrasound Guided Biopsy, Right - 2/22/2023      Pathology Code Malignancy Type    Invasive ductal carcinoma Invasive          Additional Information            Concordance: Concordant Estrogen: Positive      Progesterone: Positive    Histology Grade: G2     HER2/davon IHC: 2+     HER2/davon FISH: Negative           External: No               ADDENDUM:  CASE: [id number]   PATIENT: BHAKTI PARENT   GENDER: F   DATE OF BIRTH: [id number]   ICD9 Code: C50.811, C50.811    SPECIMEN(S):   A) Breast, lumpectomy, right - oriented (99120) B) Right   axillary lymph node (41633)       MICROSCOPIC AND DIAGNOSIS:   A) RIGHT BREAST, ORIENTED LUMPECTOMY:        1) INVASIVE DUCTAL CARCINOMA             a) Grade: [place] grade II (of III)             b) Size:  25 x 20 x 10 mm (measured and calculated in slides)             c) Margins: Uninvolved, at 3 mm from the nearest medial   margin, and at 5 mm from the lateral margin        2) DUCTAL CARCINOMA IN SITU: EIC Negative             a) Nuclear grade: Intermediate             b) Patterns: Solid and cribriform, with focal   microcalcifications             c) Size: 6 x 5 x 4 mm (measured and calculated in slides)   d) Margins: Uninvolved, at 3 mm from the nearest medial and lateral   margin, and at 4 mm from the superior margin        3) ADDITIONAL FINDINGS:   Abundant adipose tissue, with atrophic ductal and lobular units, and   weakly proliferative fibrocystic changes, with duct ectasia, apocrine   metaplasia, focal adenosis and sclerosing adenosis, and focal atypical   ductal hyperplasia        4) Previous biopsy site present, with cavity formation and   organizing changes     B) RIGHT AXILLARY LYMPH  NODE, BIOPSY:        - ONE BENIGN LYMPH NODE (0/1) WITH NO EVIDENCE OF METASTATIC   CARCINOMA        - EXTENSIVE FAT INFILTRATES     PATHOLOGIC STAGE: pT2, pN0, pM-Not applicable     Signed by: Cheyenne Jaimes MD

## 2023-03-10 NOTE — PROGRESS NOTES
"Oncology Rooming Note    March 10, 2023 1:31 PM   Cordelia CABALLERO Parent is a 77 year old female who presents for:    Chief Complaint   Patient presents with     Oncology Clinic Visit     Breast Cancer     New Consult     Initial Vitals: /61 (BP Location: Left arm, Patient Position: Sitting)   Pulse 64   Temp 97.7  F (36.5  C)   Resp 18   Wt 73.4 kg (161 lb 14.4 oz)   SpO2 99%   BMI 26.13 kg/m   Estimated body mass index is 26.13 kg/m  as calculated from the following:    Height as of 3/6/23: 1.676 m (5' 6\").    Weight as of this encounter: 73.4 kg (161 lb 14.4 oz). Body surface area is 1.85 meters squared.  No Pain (0) Comment: Data Unavailable   No LMP recorded. Patient is postmenopausal.  Allergies reviewed: Yes  Medications reviewed: Yes    Medications: Medication refills not needed today.  Pharmacy name entered into [facility]: [facility] #[id number] [id number] AT [facility]    Clinical concerns: New consult for right breast cancer Dr. Jaimes was notified.     Radiation Therapy Patient Education    Person involved with teaching: Patient and     Patient educational needs for self management of treatment-related side effects assessment completed.  [facility] Patient Ed tab contains Patient Learning Assessment    Education Materials Given  Radiation Therapy and You, Skin Care During Radiation Treatment and Coping with Fatigue    Educational Topics Discussed  Side effects expected, Pain management, Skin care, Activity, Nutrition and weight loss and When to call MD/RN    Response To Teaching  More review necessary and Verbalizes understanding    GYN Only  Vaginal Dilator-given and educated: N/A    Referrals sent: None    Chemotherapy?  TBD          Mora David RN            "

## 2023-03-10 NOTE — LETTER
3/10/2023         RE: Cordelia Thorne  [address] [address]  [facility] [address]        Dear Colleague,    Thank you for referring your patient, Cordelia Thorne, to the [facility] [place]. Please see a copy of my visit note below.      [facility] Radiation Oncology Consult Note    Patient: Cordelia Thorne  MRN: [id number]  Date of Service: 03/10/2023    Assessment / Impression   77-year-old postmenopausal female with invasive ductal carcinoma of the right breast status postbiopsy    Invasive ductal carcinoma of breast, stage 1, right (H) [C50.911]   Cancer Staging   No matching staging information was found for the patient.          Plan:   Patient was considering lumpectomy versus mastectomy but has decided on proceeding with lumpectomy.  She is seen today to discuss adjuvant radiation therapy as part of breast conservation therapy.    Discussed adjuvant radiation therapy with patient and her .  The indications for, process of, alternatives, potential side effects and complications of RT to the right breast were discussed.    They asked multiple questions which were answered to their satisfaction and they verbalized understanding.      Patient had questions regarding treatment as well as cancer prevention which were discussed in detail.  Questions about potential environmental exposures and referred to environmental working group website which details a number of products which have been tested and chemicals to avoid.    She wishes to proceed with lumpectomy and sentinel lymph node biopsy and will return to clinic following to discuss details of final pathology and radiation recommendations.    Thank you for allowing me to participate in the care of this patient. Feel free to contact me with all questions or concerns.   Intent of Therapy: Curative  Patient on concurrent Herceptin No  Adjuvant hormonal therapy: TBD  Chemotherapy: TBD  Intended fractionation schedule: TBD      Breast  cancer risk factors:   No obstetric history on file.  LMP Dates from Last 4 Encounters:   No data found for LMP        Side effects that may occur during or within weeks after radiation therapy      Fatigue and general weakness    Darkening, irritation, itchiness, redness, dryness, erythema, peeling, scabbing, ulceration and contraction of the skin of the breast and chest    Swelling of the breast    Loss of armpit hair    Lung irritation    Decrease in appetite    Side effects that may occur months or years after radiation therapy      Development of another tumor or cancer    Thickening, telangiectasias (development of spider like blood vessels in the skin) and ulceration of the skin of the breast and chest    Firming, fibrosis (scar tissue), fat necrosis, and distortion of the breast    Poor healing after a trauma or surgery in the irradiated area    Nerve damage resulting in loss of arm strength and sensation    Coronary artery blockage causing angina pain or a heart attack    Lung inflammation of fibrosis causing cough, fever and shortness of breath    Fracture of the ribs    Swellingof an arm and hand    The risks, benefits and alternatives to radiation therapy were outlined with the patient. All questions were answered and a consent was signed.    Total time of this visit, including time spent face-to-face with patient and or via video/audio, and also in preparing for today's visit for MDM and documentation. Medical decision-making included consideration and possible diagnoses, management options, complex record review, review of diagnostic tests and information, consideration and discussion of significant complications based on comorbidities, discussion with providers involved in the care of the patient.     90 Minutes spent.       Subjective:      HPI: Cordelia CABALLERO Parent is a 77 year old female with   Chief Complaint   Patient presents with     Oncology Clinic Visit     Breast Cancer     New Consult   She  reports some delay in mammograms and previous recommendations that she might not need them however she felt like the right breast was more full than the left and pursued routine screening mammogram.    2/8/2023 screening mammogram: Is a 1.5 cm spiculated mass within the right breast along the 12:00 radial.  On the left there is a loose grouping of nonspecific calcifications within the upper outer quadrant 5 cm from the nipple.  Diagnostic mammogram and targeted ultrasound recommended.  BI-RADS 0.    2/13/2023 diagnostic mammogram  Persistence of irregular spiculated mass within the right breast on o'clock position mild recommend targeted right breast ultrasound.  Probably round and punctate calcifications in the segmental distribution within the left breast brown in from the 1:00 to 2:00 positions, anterior posterior depth measuring 2.9 x 6.9 x 6.1 cm in total dimension.    Ultrasound: Right breast at the 12 o'clock position, 4 cm from the nipple demonstrates a 1.1 x 0.9 x 1.6 cm hypoechoic mass with spiculated margins and associated architectural distortion.  Right axilla evaluation demonstrates a few nonenlarged and morphologically normal lymph nodes  BI-RADS 5.    2/22/2023 ultrasound-guided core needle biopsy right breast  Invasive ductal carcinoma, grade 2, ER positive (100%), OH positive (10 to 20%), HER2/davon equivocal by IHC, negative by FISH    2/22/2023 stereotactic vacuum-assisted left breast biopsy  Benign breast tissue focal columnar cell change and moderate usual ductal hyperplasia    She met with Dr. Garcia to discuss surgical options.  She is leaning towards lumpectomy with sentinel lymph node biopsy and wanted to hear more about radiation therapy as part of breast conservation treatment.      She is concerned about cause of her breast cancer.  Discussed both genetic and environmental components to cancer.  She is concerned that her delay in routine screening mammograms based on prior recommendations  from others led to a delay in her breast cancer diagnosis.  She has some concerns regarding side effects of treatment for her breast cancer.  Overall she is active and independent.    CHEMOTHERAPY HISTORY: Concurrent Chemotherapy: No    RADIATION THERAPY HISTORY: Prior Radiation: No    IMPLANTED CARDIAC DEVICE: none     PREGNANCY: n/a  The patient is a postmenopausal female    Current Outpatient Medications   Medication Sig Dispense Refill     Lactobacillus rhamnosus GG (CULTURELLE) 15 billion cell CpSP [LACTOBACILLUS RHAMNOSUS GG (CULTURELLE) 15 BILLION CELL CPSP] Take 1 capsule by mouth daily.       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, see notes, advised treatment     Past Surgical History:   Procedure Laterality Date     LUMPECTOMY BREAST Right 3/16/2023    Procedure: BREAST WIRE LOCALIZED LUMPECTOMY WITH SENTINEL LYMPH NODE BIOPSY;  Surgeon: Xenia Garcia DO;  Location: formerly [facility] OR     TONSILLECTOMY & ADENOIDECTOMY       Amoxicillin, Monosodium glutamate, and Penicillins  Family History   Problem Relation Age of Onset     Heart Disease Mother      Social History     Socioeconomic History     Marital status:      Spouse name: Not on file     Number of children: Not on file     Years of education: Not on file     Highest education level: Not on file   Occupational History     Not on file   Tobacco Use     Smoking status: Never     Smokeless tobacco: Never   Substance and Sexual Activity     Alcohol use: Not on file     Drug use: Never     Sexual activity: Not on file   Other Topics Concern     Not on file   Social History Narrative     Not on file     Social Determinants of Health     Financial Resource Strain: Not on file   Food Insecurity: Not on file    Transportation Needs: Not on file   Physical Activity: Not on file   Stress: Not on file   Social Connections: Not on file   Intimate Partner Violence: Not on file   Housing Stability: Not on file        Review of Systems:        General     EENT     Respiratory              Cardiovascular     Endocrine     Gastrointestinal  Gastrointestinal  Gastrointestinal (WDL): All gastrointestinal elements are within defined limits  Musculoskeletal     Integumentary                  Integumentary  Integumentary (WDL): All integumentary elements are within defined limits  Neurological     Psychological/Emotional      Hematological/Lymphatic     Dermatologic     Genitourinary/Reproductive     Reproductive     Pain              Pain Score: No Pain (0)   AUA Assessment                     Accompanied by  Accompanied By: spouse      Objective:     Physical Exam    Vitals:    03/10/23 1329   BP: 130/61   Pulse: 64   Resp: 18   Temp: 97.7  F (36.5  C)   SpO2: 99%   Weight: 73.4 kg (161 lb 14.4 oz)   PainSc: No Pain (0)       Gen: Alert, in NAD pleasant interactive, well nourished  Eyes: EOMI, sclera anicteric  HENT     Head: NC/AT  Pulm: No wheezing, stridor or respiratory distress  Chest: Exam deferred today until postlumpectomy  Musculoskeletal: Normal muscle bulk and tone  Skin: Normal tone and turgor, wearing surgical gloves  Neurologic: A/Ox3, speech fluent, no focal motor deficits, gait normal and unaided  Psychiatric: Appropriate mood and affect       Recent Labs: No results found for this or any previous visit (from the past 168 hour(s)).    Imaging: Imaging results 6 weeks:MA Breast Specimen Right    Result Date: 3/16/2023  INDICATION: Pre-operative localization of invasive ductal carcinoma at 12 o'clock, 4 cm from the nipple. PROCEDURE: Informed consent was obtained from the patient. The breast was cleansed with ChloraPrep. Lidocaine was used for local anesthesia. A -gauge needle was then advanced to the area of  abnormality. A localization wire was then deployed. 531uCi Tc99m lymphoseek was injected subdermally along the upper outer aspect of the areolar margin. Post-procedure mammograms demonstrate the localization wire in appropriate position. The previously placed marker was visualized. The patient tolerated this well.    IMPRESSION: Sonographically guided  wire localization. A specimen was sent for radiography. Specimen radiograph demonstrates the area of abnormality and the localization wire to be included in the specimen.     MA Stereotactic Breast Biopsy Vacuum Assist Left    Addendum Date: 2/28/2023    Pathology Results: 1. Ultrasound guided biopsy of the right breast mass at 12:00: Invasive ductal carcinoma. 2. Stereotactic guided biopsy of the left breast calcifications upper outer quadrant: Benign breast tissue. Focal columnar changes. Scattered intraductal microcalcifications. Please see full pathology report for further details. Results are concordant with imaging findings at both sites. Recommendation: Surgical/oncology consult for the right breast malignancy. PAMELA CHAPMAN MD   SYSTEM ID:  [id number]    Result Date: 2/28/2023  Stereotactic biopsy of the left breast. Comparisons: Mammogram dated 2/13/2023. HISTORY:    Calcifications requiring biopsy. FINDINGS: Procedure, risks, benefits and alternatives were discussed with the patient and the patient gave written and verbal consent. The patient was positioned for stereotactic biopsy. Aseptic technique was utilized. 1% lidocaine was used for skin anesthesia and 15 cc of 1% lidocaine with epinephrine were utilized for deep tissue anesthesia. Using stereotactic technique and an 9 gauge vacuum assisted biopsy needle, multiple specimens were obtained and images were archived. Less than 5 mL blood loss. Calcifications are seen in the biopsy specimen. Location: Left breast upper-outer quadrant Marker: HydroMark Pressure was held over this area for approximately 15  minutes. A dressing was placed and care instructions were discussed with the patient. Post biopsy mammogram demonstrates marker deployment 6 mm superior to the residual calcifications.     IMPRESSION:  1. Uncomplicated stereotactic vacuum assisted core needle biopsy of the left breast. 2. Same-day ultrasound-guided biopsy of the right breast was also performed, dictated separately. PAMELA CHAPMAN MD   SYSTEM ID:  [id number]    MA Post Procedure Left    Addendum Date: 2/28/2023    Pathology Results: 1. Ultrasound guided biopsy of the right breast mass at 12:00: Invasive ductal carcinoma. 2. Stereotactic guided biopsy of the left breast calcifications upper outer quadrant: Benign breast tissue. Focal columnar changes. Scattered intraductal microcalcifications. Please see full pathology report for further details. Results are concordant with imaging findings at both sites. Recommendation: Surgical/oncology consult for the right breast malignancy. PAMELA CHAPMAN MD   SYSTEM ID:  [id number]    Result Date: 2/28/2023  Stereotactic biopsy of the left breast. Comparisons: Mammogram dated 2/13/2023. HISTORY:    Calcifications requiring biopsy. FINDINGS: Procedure, risks, benefits and alternatives were discussed with the patient and the patient gave written and verbal consent. The patient was positioned for stereotactic biopsy. Aseptic technique was utilized. 1% lidocaine was used for skin anesthesia and 15 cc of 1% lidocaine with epinephrine were utilized for deep tissue anesthesia. Using stereotactic technique and an 9 gauge vacuum assisted biopsy needle, multiple specimens were obtained and images were archived. Less than 5 mL blood loss. Calcifications are seen in the biopsy specimen. Location: Left breast upper-outer quadrant Marker: HydroMark Pressure was held over this area for approximately 15 minutes. A dressing was placed and care instructions were discussed with the patient. Post biopsy mammogram demonstrates marker  deployment 6 mm superior to the residual calcifications.     IMPRESSION:  1. Uncomplicated stereotactic vacuum assisted core needle biopsy of the left breast. 2. Same-day ultrasound-guided biopsy of the right breast was also performed, dictated separately. PAMELA CHAPMAN MD   SYSTEM ID:  [id number]    MA Post Procedure Right    Result Date: 3/16/2023  INDICATION: Pre-operative localization of invasive ductal carcinoma at 12 o'clock, 4 cm from the nipple. PROCEDURE: Informed consent was obtained from the patient. The breast was cleansed with ChloraPrep. Lidocaine was used for local anesthesia. A -gauge needle was then advanced to the area of abnormality. A localization wire was then deployed. 531uCi Tc99m lymphoseek was injected subdermally along the upper outer aspect of the areolar margin. Post-procedure mammograms demonstrate the localization wire in appropriate position. The previously placed marker was visualized. The patient tolerated this well.    IMPRESSION: Sonographically guided  wire localization. A specimen was sent for radiography. Specimen radiograph demonstrates the area of abnormality and the localization wire to be included in the specimen.     MA Post Procedure Right    Result Date: 2/22/2023  Ultrasound guided right breast biopsy. Comparisons: 2/13/2023 FINDINGS: Procedure, risks, benefits and alternatives were discussed with the patient and the patient gave written and verbal consent. Aseptic technique was utilized. 1% lidocaine was utilized for local anesthesia. Under ultrasound guidance, biopsy of mass was performed and marker placed as follows and images were archived: Size: 14 gauge core needle biopsy system Number of cores: 4 Position: Right breast 12:00 4 cm from the nipple Marker: HydroMark Less than 5 mL blood loss. Pressure was held over this area for approximately 10 minutes. A dressing was placed and care instructions were discussed with the patient. Post biopsy mammogram demonstrates  clip deployment.     IMPRESSION:  1. Uncomplicated ultrasound guided core needle biopsy of the right breast. 2. Same-day stereotactic guided biopsy of the left breast calcifications was also performed later today. Please refer to dedicated report for details. PAMELA CHAPMAN MD   SYSTEM ID:  [id number]    US Breast Right Limited 1-3 Quadrants    Result Date: 2/13/2023  EXAM: MA DIAGNOSTIC BILATERAL W/ CARIN, US BREAST RIGHT LIMITED 1-3 QUADRANTS LOCATION: [facility] DATE/TIME: 2/13/2023 10:15 AM INDICATION: Abnormal screening mammogram. 77-year-old female presents for imaging follow-up for a right breast mass and left breast calcifications demonstrated on recent baseline screening mammogram. COMPARISON: 2/8/2023 MAMMOGRAPHIC FINDINGS: Bilateral full-field, right breast spot compression, and left breast spot magnification digital diagnostic mammograms performed. There are scattered areas of fibroglandular density. Images evaluated with the assistance of CAD. Breast tomosynthesis was used in interpretation. There is persistence of an irregular, spiculated mass within the right breast at the 12:00 position, middle depth. Recommend targeted right breast ultrasound for further evaluation. The remaining right breast parenchyma is unremarkable. There are predominantly round and punctate calcifications in a segmental distribution within the left breast spanning from the 1:00 through 2:00 positions, anterior-posterior depth measuring approximately 2.9 x 6.9 x 6.1 cm in total dimension. The remaining left breast parenchyma is unremarkable. ULTRASOUND FINDINGS: Targeted right breast ultrasound at the 12:00 position, 4 cm from the nipple demonstrates a 1.1 x 0.9 x 1.6 cm irregular, hypoechoic mass with spiculated margins and associated architectural distortion. There is mild associated internal vascularity. This  finding correlates with the mass demonstrated on mammography. Sonographic evaluation of the  right axilla demonstrates a few nonenlarged and morphologically normal lymph nodes demonstrating retained fatty rober and thin cortices.     IMPRESSION: 1.  Suspicious mass within the right breast at the 12:00 position correlates with the mammographic finding of concern. Recommend ultrasound-guided biopsy of this mass. 2.  No right axillary lymphadenopathy. 3.  Left breast calcifications in a segmental distribution from the 1:00-2:00 positions, anterior-posterior depth. Recommend a single site, representative stereotactic guided biopsy targeting the middle of this larger group of segmental calcifications for definitive pathologic diagnosis. ACR BI-RADS Category 5: Highly Suggestive of Malignancy. I personally discussed the findings and recommendations with the patient at the conclusion of the examination.     US Breast Biopsy Core Needle Right    Addendum Date: 2/28/2023    Pathology Results: 1. Ultrasound guided biopsy of the right breast mass at 12:00: Invasive ductal carcinoma. 2. Stereotactic guided biopsy of the left breast calcifications upper outer quadrant: Benign breast tissue. Focal columnar changes. Scattered intraductal microcalcifications. Please see full pathology report for further details. Results are concordant with imaging findings at both sites. Recommendation: Surgical/oncology consult for the right breast malignancy. PAMELA CHAPMAN MD   SYSTEM ID:  [id number]    Result Date: 2/28/2023  Ultrasound guided right breast biopsy. Comparisons: 2/13/2023 FINDINGS: Procedure, risks, benefits and alternatives were discussed with the patient and the patient gave written and verbal consent. Aseptic technique was utilized. 1% lidocaine was utilized for local anesthesia. Under ultrasound guidance, biopsy of mass was performed and marker placed as follows and images were archived: Size: 14 gauge core needle biopsy system Number of cores: 4 Position: Right breast 12:00 4 cm from the nipple Marker: HydroMark Less  than 5 mL blood loss. Pressure was held over this area for approximately 10 minutes. A dressing was placed and care instructions were discussed with the patient. Post biopsy mammogram demonstrates clip deployment.     IMPRESSION:  1. Uncomplicated ultrasound guided core needle biopsy of the right breast. 2. Same-day stereotactic guided biopsy of the left breast calcifications was also performed later today. Please refer to dedicated report for details. PAMELA CHAPMAN MD   SYSTEM ID:  [id number]    MA Diagnostic Bilateral w/Carin    Result Date: 2/13/2023  EXAM: MA DIAGNOSTIC BILATERAL W/ CARIN, US BREAST RIGHT LIMITED 1-3 QUADRANTS LOCATION: [facility] DATE/TIME: 2/13/2023 10:15 AM INDICATION: Abnormal screening mammogram. 77-year-old female presents for imaging follow-up for a right breast mass and left breast calcifications demonstrated on recent baseline screening mammogram. COMPARISON: 2/8/2023 MAMMOGRAPHIC FINDINGS: Bilateral full-field, right breast spot compression, and left breast spot magnification digital diagnostic mammograms performed. There are scattered areas of fibroglandular density. Images evaluated with the assistance of CAD. Breast tomosynthesis was used in interpretation. There is persistence of an irregular, spiculated mass within the right breast at the 12:00 position, middle depth. Recommend targeted right breast ultrasound for further evaluation. The remaining right breast parenchyma is unremarkable. There are predominantly round and punctate calcifications in a segmental distribution within the left breast spanning from the 1:00 through 2:00 positions, anterior-posterior depth measuring approximately 2.9 x 6.9 x 6.1 cm in total dimension. The remaining left breast parenchyma is unremarkable. ULTRASOUND FINDINGS: Targeted right breast ultrasound at the 12:00 position, 4 cm from the nipple demonstrates a 1.1 x 0.9 x 1.6 cm irregular, hypoechoic mass with spiculated  margins and associated architectural distortion. There is mild associated internal vascularity. This  finding correlates with the mass demonstrated on mammography. Sonographic evaluation of the right axilla demonstrates a few nonenlarged and morphologically normal lymph nodes demonstrating retained fatty rober and thin cortices.     IMPRESSION: 1.  Suspicious mass within the right breast at the 12:00 position correlates with the mammographic finding of concern. Recommend ultrasound-guided biopsy of this mass. 2.  No right axillary lymphadenopathy. 3.  Left breast calcifications in a segmental distribution from the 1:00-2:00 positions, anterior-posterior depth. Recommend a single site, representative stereotactic guided biopsy targeting the middle of this larger group of segmental calcifications for definitive pathologic diagnosis. ACR BI-RADS Category 5: Highly Suggestive of Malignancy. I personally discussed the findings and recommendations with the patient at the conclusion of the examination.     MA External Imaging 3D Screening    Result Date: 2/9/2023  Images were obtained from an external facility.  Click PACS Images hyperlink to view images.  Textual results have been scanned into the media tab.    Image Guided Breast Localization w Sent Node Inj Right    Result Date: 3/16/2023  INDICATION: Pre-operative localization of invasive ductal carcinoma at 12 o'clock, 4 cm from the nipple. PROCEDURE: Informed consent was obtained from the patient. The breast was cleansed with ChloraPrep. Lidocaine was used for local anesthesia. A -gauge needle was then advanced to the area of abnormality. A localization wire was then deployed. 531uCi Tc99m lymphoseek was injected subdermally along the upper outer aspect of the areolar margin. Post-procedure mammograms demonstrate the localization wire in appropriate position. The previously placed marker was visualized. The patient tolerated this well.    IMPRESSION: Sonographically  guided  wire localization. A specimen was sent for radiography. Specimen radiograph demonstrates the area of abnormality and the localization wire to be included in the specimen.       Pathology:   No results found for this or any previous visit (from the past 8760 hour(s)).  Pathology Results       Benign - Stereotactic Biopsy, Left - 2/22/2023      Pathology Code Malignancy Type    Ductal hyperplasia, Usual     Benign Calcifications           Additional Information            Concordance: Concordant           External: No              Malignant - Ultrasound Guided Biopsy, Right - 2/22/2023      Pathology Code Malignancy Type    Invasive ductal carcinoma Invasive          Additional Information            Concordance: Concordant Estrogen: Positive      Progesterone: Positive    Histology Grade: G2     HER2/davon IHC: 2+     HER2/davon FISH: Negative           External: No               ADDENDUM:  CASE: [id number]   PATIENT: BHAKTI PARENT   GENDER: F   DATE OF BIRTH: [id number]   ICD9 Code: C50.811, C50.811    SPECIMEN(S):   A) Breast, lumpectomy, right - oriented (84884) B) Right   axillary lymph node (51593)       MICROSCOPIC AND DIAGNOSIS:   A) RIGHT BREAST, ORIENTED LUMPECTOMY:        1) INVASIVE DUCTAL CARCINOMA             a) Grade: [place] grade II (of III)             b) Size:  25 x 20 x 10 mm (measured and calculated in slides)             c) Margins: Uninvolved, at 3 mm from the nearest medial   margin, and at 5 mm from the lateral margin        2) DUCTAL CARCINOMA IN SITU: EIC Negative             a) Nuclear grade: Intermediate             b) Patterns: Solid and cribriform, with focal   microcalcifications             c) Size: 6 x 5 x 4 mm (measured and calculated in slides)   d) Margins: Uninvolved, at 3 mm from the nearest medial and lateral   margin, and at 4 mm from the superior margin        3) ADDITIONAL FINDINGS:   Abundant adipose tissue, with atrophic ductal and lobular units, and   weakly  "proliferative fibrocystic changes, with duct ectasia, apocrine   metaplasia, focal adenosis and sclerosing adenosis, and focal atypical   ductal hyperplasia        4) Previous biopsy site present, with cavity formation and   organizing changes     B) RIGHT AXILLARY LYMPH NODE, BIOPSY:        - ONE BENIGN LYMPH NODE (0/1) WITH NO EVIDENCE OF METASTATIC   CARCINOMA        - EXTENSIVE FAT INFILTRATES     PATHOLOGIC STAGE: pT2, pN0, pM-Not applicable     Signed by: Cheyenne Jaimes MD        Oncology Rooming Note    March 10, 2023 1:31 PM   Cordelia Thorne is a 77 year old female who presents for:    Chief Complaint   Patient presents with     Oncology Clinic Visit     Breast Cancer     New Consult     Initial Vitals: /61 (BP Location: Left arm, Patient Position: Sitting)   Pulse 64   Temp 97.7  F (36.5  C)   Resp 18   Wt 73.4 kg (161 lb 14.4 oz)   SpO2 99%   BMI 26.13 kg/m   Estimated body mass index is 26.13 kg/m  as calculated from the following:    Height as of 3/6/23: 1.676 m (5' 6\").    Weight as of this encounter: 73.4 kg (161 lb 14.4 oz). Body surface area is 1.85 meters squared.  No Pain (0) Comment: Data Unavailable   No LMP recorded. Patient is postmenopausal.  Allergies reviewed: Yes  Medications reviewed: Yes    Medications: Medication refills not needed today.  Pharmacy name entered into [facility]: [id number] [address] AT [facility]    Clinical concerns: New consult for right breast cancer Dr. Jaimes was notified.     Radiation Therapy Patient Education    Person involved with teaching: Patient and     Patient educational needs for self management of treatment-related side effects assessment completed.  [facility] Patient Ed tab contains Patient Learning Assessment    Education Materials Given  Radiation Therapy and You, Skin Care During Radiation Treatment and Coping with Fatigue    Educational Topics Discussed  Side effects expected, Pain " management, Skin care, Activity, Nutrition and weight loss and When to call MD/RN    Response To Teaching  More review necessary and Verbalizes understanding    GYN Only  Vaginal Dilator-given and educated: N/A    Referrals sent: None    Chemotherapy?  TBD          Mora David RN                Again, thank you for allowing me to participate in the care of your patient.        Sincerely,        Cheyenne Jaimes MD

## 2023-03-10 NOTE — LETTER
Thank you for choosing [facility] General Surgery for your care. You are scheduled for a lumpectomy of your right breast with Dr. Garcia. Details are as follows:    Pre-op Physical: Completed with Dr. Johnson at [facility] on 03/07/2023.    Surgery Date: 03/16/2023     Location: [place] [address].    Approximate Arrival Time: 8:15 a.m., to the Breast Center, [facility] at [address] [place] [address] (Unless instructed differently by the pre-op call nurse)   Approximate Surgery Start Time: 10:00 a.m.    Post op Appointment: 03/22/2023 at 1:20 p.m., with Dr. Garcia at [facility]. [address] [place] [address]. [place] [address]. Arrive at 1:05 p.m.    Prep Tasks and Info:     1. Review your medications with your primary care or prescribing physician; they will advise you which meds to stop and when, and when you can resume taking.  Certain medications like blood thinners, need to be stopped in advance of surgery to proceed safely.    2. You must get tested for COVID-19, even if you are vaccinated.    Outpatient Surgery:  If you are going home the same day of surgery, a home rapid antigen Covid-19 test is required 1-2 days before surgery- regardless of your vaccination status.  Take a photo of the negative result and show to the nurse on the day of surgery. If you test positive, contact our office right away to reschedule surgery. You can buy a home Covid-19 Rapid Antigen test at many local pharmacies, or you can order for free at covid.gov/tests.    3. Please shower the evening before and morning of surgery with Hibiclens soap. This can be found at your local pharmacy.     4. Fasting instructions will be provided by the pre-op nurse who will call you 1-3 days before surgery.  Typically we advise normal food up to 8 hours before you arrive for surgery. Clear liquids only from then until 2  hours before you arrive surgery then nothing at all by mouth.  The nurse will review your specific instructions with you at the call.     5. Smoking impacts your body's ability to heal properly.  If you are a smoker, we strongly urge you to stop smoking 4-6 weeks before surgery. Plastic surgery patients ar required to be off nicotine for at least 8 weeks before surgery.    6. You will need an adult to drive you home and stay with you 24 hours after surgery. Public transportation or Medical Van Services are not permitted.    7. You may have one family member wait in the lobby at the surgery center during your surgery. Visitor restrictions are subject to change, please verify with the pre-op nurse when they call.    8. If the community sees a new COVID19 surge, your procedure may need to be postponed. We will contact you if this happens. You will be screened for high-risk exposure to Covid-19 during the pre-op call.  We encourage you to quarantine yourself away from any Covid-19 people for 10 days before surgery to avoid possible last minute cancellations.   When you arrive to the surgery center, you will again be screened for COVID19 symptoms. If you screen positive, your surgery will need to be postponed.    9. We always encourage you to notify your insurance any time you have medical tests or procedures scheduled including surgery. The number is usually right on the back of your insurance card. Please call [facility] Cost of Care at [phone] if you'd like a surgery quote.    Call our office if you have any questions! Thank you!

## 2023-03-14 ENCOUNTER — TELEPHONE (OUTPATIENT)
Dept: RADIATION ONCOLOGY | Facility: HOSPITAL | Age: 78
End: 2023-03-14
Payer: COMMERCIAL

## 2023-03-14 NOTE — TELEPHONE ENCOUNTER
Pt calling and wanting to speak with Dr. Jaimes. Informed pt that I could answer questions. Pt wondering if going to get lung cancer from radiation, informed pt that she would not. Pt also asking about deodorant. Informed pt to use aluminum free and look at hand out given to pt in consult. Pt appreciative of answers. Informed to call with any further questions or concerns.

## 2023-03-15 ENCOUNTER — ANESTHESIA EVENT (OUTPATIENT)
Dept: SURGERY | Facility: AMBULATORY SURGERY CENTER | Age: 78
End: 2023-03-15
Payer: COMMERCIAL

## 2023-03-16 ENCOUNTER — HOSPITAL ENCOUNTER (OUTPATIENT)
Facility: AMBULATORY SURGERY CENTER | Age: 78
Discharge: HOME | End: 2023-03-16
Attending: SURGERY
Payer: COMMERCIAL

## 2023-03-16 ENCOUNTER — ANCILLARY PROCEDURE (OUTPATIENT)
Dept: MAMMOGRAPHY | Facility: CLINIC | Age: 78
End: 2023-03-16
Attending: SURGERY
Payer: COMMERCIAL

## 2023-03-16 ENCOUNTER — HOSPITAL ENCOUNTER (OUTPATIENT)
Dept: MAMMOGRAPHY | Facility: CLINIC | Age: 78
Discharge: HOME | End: 2023-03-16
Attending: SURGERY
Payer: COMMERCIAL

## 2023-03-16 ENCOUNTER — ANESTHESIA (OUTPATIENT)
Dept: SURGERY | Facility: AMBULATORY SURGERY CENTER | Age: 78
End: 2023-03-16
Payer: COMMERCIAL

## 2023-03-16 VITALS
BODY MASS INDEX: 26.03 KG/M2 | TEMPERATURE: 97 F | DIASTOLIC BLOOD PRESSURE: 62 MMHG | RESPIRATION RATE: 16 BRPM | OXYGEN SATURATION: 99 % | WEIGHT: 162 LBS | HEIGHT: 66 IN | HEART RATE: 69 BPM | SYSTOLIC BLOOD PRESSURE: 134 MMHG

## 2023-03-16 DIAGNOSIS — C50.911 INVASIVE DUCTAL CARCINOMA OF BREAST, STAGE 1, RIGHT (H): ICD-10-CM

## 2023-03-16 DIAGNOSIS — G89.18 POSTOPERATIVE PAIN: Primary | ICD-10-CM

## 2023-03-16 PROCEDURE — 999N000065 MA POST PROCEDURE RIGHT

## 2023-03-16 PROCEDURE — 76098 X-RAY EXAM SURGICAL SPECIMEN: CPT

## 2023-03-16 PROCEDURE — 19125 EXCISION BREAST LESION: CPT | Mod: RT | Performed by: SURGERY

## 2023-03-16 PROCEDURE — 272N000431 IMAGE GUIDED BREAST LOCALIZATION W SENT NODE INJ RIGHT

## 2023-03-16 PROCEDURE — 343N000001 HC RX 343: Performed by: SURGERY

## 2023-03-16 PROCEDURE — 76998 US GUIDE INTRAOP: CPT | Mod: 26 | Performed by: SURGERY

## 2023-03-16 PROCEDURE — A9520 TC99 TILMANOCEPT DIAG 0.5MCI: HCPCS | Performed by: SURGERY

## 2023-03-16 PROCEDURE — 250N000009 HC RX 250: Performed by: SURGERY

## 2023-03-16 PROCEDURE — 38525 BIOPSY/REMOVAL LYMPH NODES: CPT | Mod: RT | Performed by: SURGERY

## 2023-03-16 RX ORDER — DEXAMETHASONE SODIUM PHOSPHATE 4 MG/ML
INJECTION, SOLUTION INTRA-ARTICULAR; INTRALESIONAL; INTRAMUSCULAR; INTRAVENOUS; SOFT TISSUE PRN
Status: DISCONTINUED | OUTPATIENT
Start: 2023-03-16 | End: 2023-03-16

## 2023-03-16 RX ORDER — CEFAZOLIN SODIUM 2 G/100ML
2 INJECTION, SOLUTION INTRAVENOUS
Status: COMPLETED | OUTPATIENT
Start: 2023-03-16 | End: 2023-03-16

## 2023-03-16 RX ORDER — ONDANSETRON 2 MG/ML
INJECTION INTRAMUSCULAR; INTRAVENOUS PRN
Status: DISCONTINUED | OUTPATIENT
Start: 2023-03-16 | End: 2023-03-16

## 2023-03-16 RX ORDER — GLYCOPYRROLATE 0.2 MG/ML
INJECTION, SOLUTION INTRAMUSCULAR; INTRAVENOUS PRN
Status: DISCONTINUED | OUTPATIENT
Start: 2023-03-16 | End: 2023-03-16

## 2023-03-16 RX ORDER — LIDOCAINE HYDROCHLORIDE 10 MG/ML
INJECTION, SOLUTION INFILTRATION; PERINEURAL PRN
Status: DISCONTINUED | OUTPATIENT
Start: 2023-03-16 | End: 2023-03-16

## 2023-03-16 RX ORDER — OXYCODONE HYDROCHLORIDE 10 MG/1
10 TABLET ORAL
Status: DISCONTINUED | OUTPATIENT
Start: 2023-03-16 | End: 2023-03-17 | Stop reason: HOSPADM

## 2023-03-16 RX ORDER — LIDOCAINE 40 MG/G
CREAM TOPICAL
Status: DISCONTINUED | OUTPATIENT
Start: 2023-03-16 | End: 2023-03-17 | Stop reason: HOSPADM

## 2023-03-16 RX ORDER — OXYCODONE HYDROCHLORIDE 5 MG/1
5 TABLET ORAL
Status: DISCONTINUED | OUTPATIENT
Start: 2023-03-16 | End: 2023-03-17 | Stop reason: HOSPADM

## 2023-03-16 RX ORDER — TRAMADOL HYDROCHLORIDE 50 MG/1
50 TABLET ORAL EVERY 6 HOURS PRN
Qty: 10 TABLET | Refills: 0 | Status: SHIPPED | OUTPATIENT
Start: 2023-03-16 | End: 2023-03-19

## 2023-03-16 RX ORDER — FENTANYL CITRATE 50 UG/ML
INJECTION, SOLUTION INTRAMUSCULAR; INTRAVENOUS PRN
Status: DISCONTINUED | OUTPATIENT
Start: 2023-03-16 | End: 2023-03-16

## 2023-03-16 RX ORDER — PROPOFOL 10 MG/ML
INJECTION, EMULSION INTRAVENOUS CONTINUOUS PRN
Status: DISCONTINUED | OUTPATIENT
Start: 2023-03-16 | End: 2023-03-16

## 2023-03-16 RX ORDER — ACETAMINOPHEN 325 MG/1
975 TABLET ORAL ONCE
Status: COMPLETED | OUTPATIENT
Start: 2023-03-16 | End: 2023-03-16

## 2023-03-16 RX ORDER — PROPOFOL 10 MG/ML
INJECTION, EMULSION INTRAVENOUS PRN
Status: DISCONTINUED | OUTPATIENT
Start: 2023-03-16 | End: 2023-03-16

## 2023-03-16 RX ORDER — SODIUM CHLORIDE, SODIUM LACTATE, POTASSIUM CHLORIDE, CALCIUM CHLORIDE 600; 310; 30; 20 MG/100ML; MG/100ML; MG/100ML; MG/100ML
INJECTION, SOLUTION INTRAVENOUS CONTINUOUS
Status: DISCONTINUED | OUTPATIENT
Start: 2023-03-16 | End: 2023-03-17 | Stop reason: HOSPADM

## 2023-03-16 RX ADMIN — LIDOCAINE HYDROCHLORIDE 50 MG: 10 INJECTION, SOLUTION INFILTRATION; PERINEURAL at 10:35

## 2023-03-16 RX ADMIN — DEXAMETHASONE SODIUM PHOSPHATE 4 MG: 4 INJECTION, SOLUTION INTRA-ARTICULAR; INTRALESIONAL; INTRAMUSCULAR; INTRAVENOUS; SOFT TISSUE at 10:42

## 2023-03-16 RX ADMIN — LIDOCAINE HYDROCHLORIDE 10 ML: 10 INJECTION, SOLUTION INFILTRATION; PERINEURAL at 09:23

## 2023-03-16 RX ADMIN — PROPOFOL 30 MG: 10 INJECTION, EMULSION INTRAVENOUS at 10:38

## 2023-03-16 RX ADMIN — ONDANSETRON 4 MG: 2 INJECTION INTRAMUSCULAR; INTRAVENOUS at 10:35

## 2023-03-16 RX ADMIN — ACETAMINOPHEN 975 MG: 325 TABLET ORAL at 12:15

## 2023-03-16 RX ADMIN — SODIUM CHLORIDE, SODIUM LACTATE, POTASSIUM CHLORIDE, CALCIUM CHLORIDE: 600; 310; 30; 20 INJECTION, SOLUTION INTRAVENOUS at 10:17

## 2023-03-16 RX ADMIN — GLYCOPYRROLATE 0.2 MG: 0.2 INJECTION, SOLUTION INTRAMUSCULAR; INTRAVENOUS at 10:46

## 2023-03-16 RX ADMIN — TILMANOCEPT 0.53 MILLICURIE: KIT at 08:43

## 2023-03-16 RX ADMIN — FENTANYL CITRATE 25 MCG: 50 INJECTION, SOLUTION INTRAMUSCULAR; INTRAVENOUS at 10:41

## 2023-03-16 RX ADMIN — LIDOCAINE HYDROCHLORIDE 10 ML: 10 INJECTION, SOLUTION INFILTRATION; PERINEURAL at 09:15

## 2023-03-16 RX ADMIN — CEFAZOLIN SODIUM 2 G: 2 INJECTION, SOLUTION INTRAVENOUS at 10:35

## 2023-03-16 RX ADMIN — PROPOFOL 160 MCG/KG/MIN: 10 INJECTION, EMULSION INTRAVENOUS at 10:35

## 2023-03-16 RX ADMIN — PROPOFOL 20 MG: 10 INJECTION, EMULSION INTRAVENOUS at 10:35

## 2023-03-16 RX ADMIN — GLYCOPYRROLATE 0.2 MG: 0.2 INJECTION, SOLUTION INTRAMUSCULAR; INTRAVENOUS at 11:12

## 2023-03-16 RX ADMIN — PROPOFOL 20 MG: 10 INJECTION, EMULSION INTRAVENOUS at 11:05

## 2023-03-16 RX ADMIN — FENTANYL CITRATE 25 MCG: 50 INJECTION, SOLUTION INTRAMUSCULAR; INTRAVENOUS at 11:03

## 2023-03-16 RX ADMIN — PROPOFOL 20 MG: 10 INJECTION, EMULSION INTRAVENOUS at 10:54

## 2023-03-16 NOTE — INTERVAL H&P NOTE
Patient seen in preop.  Denies new medical history since she was last seen.  Wire localization complete and imaging reviewed by myself.  All questions answered regarding procedure.  Consent obtained.  To the OR for right breast wire localized lumpectomy with sentinel lymph node biopsy.    Xenia Garcia DO  General Surgeon  [facility]  Breast Center - [address] [address]?  Office: [phone]  Employed by - [facility] [facility]

## 2023-03-16 NOTE — DISCHARGE INSTRUCTIONS
If you have any questions or concerns regarding your procedure, please contact Dr. Xenia Garcia, her office number is [phone].     Discharge Instructions: After Your Surgery    You ve just had surgery. During surgery, you were given medicine called anesthesia to keep you relaxed and free of pain. After surgery, you may have some pain or nausea. This is common. Here are some tips for feeling better and getting well after surgery.    Going home    Your healthcare provider will show you how to take care of yourself when you go home. He or she will also answer your questions. Have an adult family member or friend drive you home. For the first 24 hours after your surgery:  Don't drive or use heavy equipment.  Don't make important decisions or sign legal papers.  Don't drink alcohol.  Have an adult stay with you for 24 hours. He or she can watch for problems and help keep you safe.  Be sure to go to all follow-up visits with your healthcare provider. And rest after your surgery for as long as your healthcare provider tells you to.    Coping with pain    If you have pain after surgery, pain medicine will help you feel better. Take it as told, before pain becomes severe. Also, ask your healthcare provider or pharmacist about other ways to control pain. This might be with heat, ice, or relaxation. And follow any other instructions your surgeon or nurse gives you.    Tips for taking pain medicine    To get the best relief possible, remember these points:  Pain medicines can upset your stomach. Taking them with a little food may help.  Most pain relievers taken by mouth need at least 20 to 30 minutes to start to work.  Don't wait till your pain becomes severe before you take your medicine. Try to time your medicine so that you can take it before starting an activity. This might be before you get dressed, go for a walk, or sit down for dinner.  Constipation is a common side effect of pain medicines. It's ok to take  medicines such as laxatives or stool softeners to help ease constipation. Also ask if you should skip any foods. Drinking lots of fluids and eating foods such as fruits and vegetables that are high in fiber can also help.  Drinking alcohol and taking pain medicine can cause dizziness and slow your breathing. It can even be deadly. Don't drink alcohol while taking pain medicine.  Pain medicine can make you react more slowly to things. Don't drive or run machinery while taking pain medicine.  Your healthcare provider may tell you to take acetaminophen to help ease your pain. Ask him or her how much you are supposed to take each day. Acetaminophen or other pain relievers may interact with your prescription medicines or other over-the-counter (OTC) medicines. Some prescription medicines have acetaminophen and other ingredients. Using both prescription and OTC acetaminophen for pain can cause you to overdose. Read the labels on your OTC medicines with care. This will help you to clearly know the list of ingredients, how much to take, and any warnings. It may also help you not take too much acetaminophen. If you have questions or don't understand the information, ask your pharmacist or healthcare provider to explain it to you before you take the OTC medicine.    Managing nausea    Some people have an upset stomach after surgery. This is often because of anesthesia, pain, or pain medicine, or the stress of surgery. These tips will help you handle nausea and eat healthy foods as you get better. If you were on a special food plan before surgery, ask your healthcare provider if you should follow it while you get better. These tips may help:    Don't push yourself to eat. Your body will tell you when to eat and how much.  Start off with clear liquids and soup. They are easier to digest.  Next try semi-solid foods, such as mashed potatoes, applesauce, and gelatin, as you feel ready.  Slowly move to solid foods. Don t eat fatty,  rich, or spicy foods at first.  Don't force yourself to have 3 large meals a day. Instead eat smaller amounts more often.  Take pain medicines with a small amount of solid food, such as crackers or toast, to prevent nausea.    When to call your healthcare provider    Call your healthcare provider if:  You still have intolerable pain an hour after taking medicine. The medicine may not be strong enough.  You feel too sleepy, dizzy, or groggy. The medicine may be too strong.  You have side effects such as nausea or vomiting, or skin changes such as rash, itching, or hives. Your healthcare provider may suggest other medicines to control side effects.  Rash, itching, or hives may mean you have an allergic reaction. Report this right away. If you have trouble breathing or facial swelling, call 911 right away.    If you have obstructive sleep apnea    You were given anesthesia medicine during surgery to keep you comfortable and free of pain. After surgery, you may have more apnea spells because of this medicine and other medicines you were given. The spells may last longer than usual.   At home:  Keep using the continuous positive airway pressure (CPAP) device when you sleep. Unless your healthcare provider tells you not to, use it when you sleep, day or night. CPAP is a common device used to treat obstructive sleep apnea.  Talk with your provider before taking any pain medicine, muscle relaxants, or sedatives. Your provider will tell you about the possible dangers of taking these medicines.

## 2023-03-16 NOTE — ANESTHESIA PREPROCEDURE EVALUATION
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     No history of anesthetic complications       ROS/MED HX  ENT/Pulmonary:       Neurologic:       Cardiovascular:     (+) -----valvular problems/murmurs     METS/Exercise Tolerance:     Hematologic:       Musculoskeletal:       GI/Hepatic:       Renal/Genitourinary:       Endo:       Psychiatric/Substance Use:       Infectious Disease:       Malignancy:       Other:            Physical Exam    Airway        Mallampati: II    Neck ROM: full     Respiratory Devices and Support         Dental       (+) Minor Abnormalities - some fillings, tiny chips      Cardiovascular   cardiovascular exam normal          Pulmonary   pulmonary exam normal                OUTSIDE LABS:  CBC:   Lab Results   Component Value Date    WBC 7.7 10/27/2022    WBC 11.0 2021    HGB 14.4 10/27/2022    HGB 14.0 2021    HCT 45.2 10/27/2022    HCT 42.7 2021     10/27/2022     2021     BMP:   Lab Results   Component Value Date     2022     10/27/2022    POTASSIUM 4.8 2022    POTASSIUM 4.7 10/27/2022    CHLORIDE 101 2022     CHLORIDE 104 10/27/2022    CO2 26 12/08/2022    CO2 23 10/27/2022    BUN 16.9 12/08/2022    BUN 19.4 10/27/2022    CR 0.77 12/08/2022    CR 0.71 10/27/2022     (H) 12/08/2022     (H) 10/27/2022     COAGS: No results found for: PTT, INR, FIBR  POC: No results found for: BGM, HCG, HCGS  HEPATIC:   Lab Results   Component Value Date    ALBUMIN 4.9 12/08/2022    PROTTOTAL 7.1 12/08/2022    ALT 25 12/08/2022    AST 19 12/08/2022    ALKPHOS 91 12/08/2022    BILITOTAL 0.4 12/08/2022     OTHER:   Lab Results   Component Value Date    RICHARD 9.8 12/08/2022    MAG 1.9 02/26/2021       Anesthesia Plan    ASA Status:  2      Anesthesia Type: MAC.              Consents    Anesthesia Plan(s) and associated risks, benefits, and realistic alternatives discussed. Questions answered and patient/representative(s) expressed understanding.     - Discussed: Risks, Benefits and Alternatives for the PROCEDURE were discussed     - Discussed with:  Patient      - Extended Intubation/Ventilatory Support Discussed: No.      - Patient is DNR/DNI Status: No    Use of blood products discussed: No .     Postoperative Care    Pain management: Multi-modal analgesia.        Comments:                Lorie Mason MD

## 2023-03-16 NOTE — OP NOTE
Name:  Cordelia CABALLERO Parent  PCP:  Shahid Ruggiero  Procedure Date:  3/16/2023      RIGHT BREAST WIRE LOCALIZED LUMPECTOMY WITH SENTINEL LYMPH NODE BIOPSY       Pre-Procedure Diagnosis:  Invasive ductal carcinoma of breast, stage 1, right     Post-Procedure Diagnosis:    Same    Anesthesia Type:    MAC with local    Estimated Blood Loss:   5 cc    Specimens:    Right lumpectomy  Right axillary lymph node    Complications:    None apparent    Findings:  Tumor within excised tissue    [place] Node Biopsy for Breast Cancer - Right  Operation performed with curative intent Yes   Tracer(s) used to identify sentinel nodes in the upfront surgery (non-neoadjuvant) setting Radioactive tracer   Tracer(s) used to identify sentinel nodes in the neoadjuvant setting N/A   All nodes (colored or non-colored) present at the end of a dye-filled lymphatic channel were removed N/A   All significantly radioactive nodes were removed N/A   All palpably suspicious nodes were removed N/A   Biopsy-proven positive nodes marked with clips prior to chemotherapy were identified and removed N/A       Indication for Procedure:  This is a 77-year-old female who recently had a screening mammogram.  A new asymmetry was seen within the right breast.  Diagnostic work-up revealed an invasive ductal carcinoma.  She has elected for breast preservation therapy for treatment.    Operative Report:    After informed consent was obtained, and the risks and benefits of the procedure were discussed, the patient was brought back to the operative suite and placed in the supine position.  Preoperatively, a localization wire was placed and the breast was injected with Lymphoseek.  MAC anesthesia was provided by the department of anesthesia.  Ultrasound was utilized by myself to visualize the tumor and clip within the breast parenchyma at 12:00 zone 2.  The right breast and axilla were prepped and draped in the usual sterile fashion.  After infiltration with a  combination of 1% lidocaine and quarter percent Marcaine a curvilinear shaped incision was made over the lesion in the breast at 12:00.  This was carried down through the subcutaneous tissues and the wire was delivered into the incision.  About 1 cm of tissue was spared anteriorly.  I then went around the wire and palpable mass widely with electrocautery.  The posterior margin was chest wall.  Once removed, the specimen was marked for orientation and sent to radiology, who confirmed removal of the tumor and clip.  The specimen was then sent to pathology for permanent sectioning.  Through the lumpectomy incision, the clavipectoral fascia was incised along the pectoralis muscle.  The axillary pad was palpated and no abnormalities were noted.  After a significant evaluation, the gamma probe failed to identify a significant sentinel lymph node.  It turned out that the activity went to the internal mammary lymph nodes.  During exploration of the axilla, one lymph node was removed and this was sent for permanent sectioning.  There continued to be no significant gamma activity in the axilla.  Both areas were than inspected for hemostasis.  The axilla was closed down with interrupted 3-0 Vicryl followed by the lumpectomy site.  A clip was left within the central aspect of the lumpectomy cavity.  This was followed by interrupted 4-0 Vicryl for the deep dermis, and a subcuticular stitch of 4-0 Monocryl to the skin.  Dermabond was then placed over the incision.    Instrument, sponge, and needle counts were correct at closure and at the conclusion of the case.     Disposition:  The patient tolerated the procedure well.  They were transferred to the postanesthesia care unit in stable condition.      Xenia Garcia DO  General Surgeon  [facility]  Breast [address] [address]  Office: [phone]  Employed by - [facility]

## 2023-03-16 NOTE — ANESTHESIA POSTPROCEDURE EVALUATION
Patient: Cordelia Thorne    Procedure: Procedure(s):  BREAST WIRE LOCALIZED LUMPECTOMY WITH SENTINEL LYMPH NODE BIOPSY       Anesthesia Type:  MAC    Note:  Disposition: Outpatient   Postop Pain Control: Uneventful            Sign Out: Well controlled pain   PONV: No   Neuro/Psych: Uneventful            Sign Out: Acceptable/Baseline neuro status   Airway/Respiratory: Uneventful            Sign Out: Acceptable/Baseline resp. status   CV/Hemodynamics: Uneventful            Sign Out: Acceptable CV status; No obvious hypovolemia; No obvious fluid overload   Other NRE: NONE   DID A NON-ROUTINE EVENT OCCUR? No           Last vitals:  Vitals Value Taken Time   /62 03/16/23 1215   Temp 97  F (36.1  C) 03/16/23 1134   Pulse 73 03/16/23 1217   Resp 16 03/16/23 1215   SpO2 98 % 03/16/23 1217   Vitals shown include unvalidated device data.    Electronically Signed By: Lorie Mason MD  March 16, 2023  2:58 PM

## 2023-03-16 NOTE — ANESTHESIA CARE TRANSFER NOTE
Patient: Cordelia Thorne    Procedure: Procedure(s):  BREAST WIRE LOCALIZED LUMPECTOMY WITH SENTINEL LYMPH NODE BIOPSY       Diagnosis: Invasive ductal carcinoma of breast, stage 1, right (H) [C50.911]  Diagnosis Additional Information: No value filed.    Anesthesia Type:   MAC     Note:    Oropharynx: oropharynx clear of all foreign objects and spontaneously breathing  Level of Consciousness: awake  Oxygen Supplementation: room air    Independent Airway: airway patency satisfactory and stable  Dentition: dentition unchanged  Vital Signs Stable: post-procedure vital signs reviewed and stable  Report to RN Given: handoff report given  Patient transferred to: Phase II    Handoff Report: Identifed the Patient, Identified the Reponsible Provider, Reviewed the pertinent medical history, Discussed the surgical course, Reviewed Intra-OP anesthesia mangement and issues during anesthesia, Set expectations for post-procedure period and Allowed opportunity for questions and acknowledgement of understanding      Vitals:  Vitals Value Taken Time   /56 03/16/23 1134   Temp 97  F (36.1  C) 03/16/23 1134   Pulse 73 03/16/23 1135   Resp 16 03/16/23 1134   SpO2 96 % 03/16/23 1135   Vitals shown include unvalidated device data.    Electronically Signed By: JOSE ROBERTO Recinos CRNA  March 16, 2023  11:37 AM

## 2023-03-17 ENCOUNTER — TELEPHONE (OUTPATIENT)
Dept: SURGERY | Facility: CLINIC | Age: 78
End: 2023-03-17
Payer: COMMERCIAL

## 2023-03-17 NOTE — TELEPHONE ENCOUNTER
Patient had surgery yesterday and a surgery nurse did a follow-up call and she has a red and hard area under the breast area and was told to call Dr. Garcia office to talk to someone and maybe send a picture to be reviewed.    Please call and advise.    Thanks!

## 2023-03-17 NOTE — TELEPHONE ENCOUNTER
"Patient called, had surgery with Dr. Evans yesterday.  Was doing really well, minimal pain, only took two tylenol last evening before bed. Was on the phone with the surgery nurse, making the check in phone call, and mentioned to him she has what she describes as a\"bruise bag, sagging\" on the under part of the breast.  He advised she call Dr. Garcia to let her know.  Patient states she noticed this \"bruise bag, sagging\" last night.  Feels it is stable in size since last night.  No real pain.  Skin may be a little pink with \"what look like little dots\".  She will attempt to send a picture.  I told her I will forward those onto Dr. Garcia.  Told her that because the swelling is stable since last night and she is not having any pain, try wearing a sports bra or a bra with support.  Continue to use and ice pack on and off.  Call if she notices any increase in swelling or pain in the breast.  Will notify Dr. Garcia of above.    "

## 2023-03-17 NOTE — TELEPHONE ENCOUNTER
Called navjot back to reassure her that Dr. Garcia saw her pictures and feels she is having post op bruising and it will just take time to heal and resolve.  Made sure patient had the on call number for over the weekend. Reassured her I am back on Monday, 3-20-23.  Told her to call with any increase in swelling or pain.  Support provided, invited calls.

## 2023-03-20 ENCOUNTER — DOCUMENTATION ONLY (OUTPATIENT)
Dept: SURGERY | Facility: CLINIC | Age: 78
End: 2023-03-20
Payer: COMMERCIAL

## 2023-03-20 NOTE — PROGRESS NOTES
Per Dr. Garcia'  request, order for Oncotype submitted through [employer] online portal.

## 2023-03-21 NOTE — PROGRESS NOTES
History:  Cordelia Thorne is s/p right lumpectomy with LN biopsy on March 16.  She is doing well.  Continues to feel better each day.    Physical exam:  BREAST: Yellow and blue ecchymosis to the lower half of the right breast.  Right 12:00 incision has a pretty significant healing ridge, but no evidence of underlying or surrounding infection.    Pathology:  MICROSCOPIC AND DIAGNOSIS:   A) RIGHT BREAST, ORIENTED LUMPECTOMY:        1) INVASIVE DUCTAL CARCINOMA             a) Grade: Yamel grade II (of III)             b) Size:  25 x 20 x 10 mm (measured and calculated in slides)             c) Margins: Uninvolved, at 3 mm from the nearest medial   margin, and at 5 mm from the lateral margin        2) DUCTAL CARCINOMA IN SITU: EIC Negative             a) Nuclear grade: Intermediate             b) Patterns: Solid and cribriform, with focal microcalcifications             c) Size: 6 x 5 x 4 mm (measured and calculated in slides)   d) Margins: Uninvolved, at 3 mm from the nearest medial and lateral   margin, and at 4 mm from the superior margin        3) ADDITIONAL FINDINGS:   Abundant adipose tissue, with atrophic ductal and lobular units, and   weakly proliferative fibrocystic changes, with duct ectasia, apocrine   metaplasia, focal adenosis and sclerosing adenosis, and focal atypical   ductal hyperplasia        4) Previous biopsy site present, with cavity formation and organizing changes     B) RIGHT AXILLARY LYMPH NODE, BIOPSY:        - ONE BENIGN LYMPH NODE (0/1) WITH NO EVIDENCE OF METASTATIC CARCINOMA        - EXTENSIVE FAT INFILTRATES     PATHOLOGIC STAGE: pT2, pN0, pM-Not applicable     ASSESSMENT:  Cordelia Thorne is s/p right lumpectomy for invasive ductal carcinoma    - Grade II, T2, N0, ER/SD+, HER2-  --> pathologic prognostic stage IA    PLAN:  Discussed typical healing.  Pathology was discussed.  Discussed Oncotype, which has been ordered (ERD not provided)  Referral placed for medical oncology.  She  would like to see someone at [facility].  I think Dr. Joel would be good for her.  She should return to discuss radiation with Dr. Jaimes.  Continue with yearly mammograms  Return to the breast clinic in 1 year, or earlier as needed    Xenia Garcia DO  General Surgeon  [facility]  Breast Center - [address] [address]  Office: [phone]  Employed by - [facility]

## 2023-03-22 ENCOUNTER — OFFICE VISIT (OUTPATIENT)
Dept: SURGERY | Facility: CLINIC | Age: 78
End: 2023-03-22
Attending: SURGERY
Payer: COMMERCIAL

## 2023-03-22 ENCOUNTER — PATIENT OUTREACH (OUTPATIENT)
Dept: ONCOLOGY | Facility: CLINIC | Age: 78
End: 2023-03-22

## 2023-03-22 ENCOUNTER — DOCUMENTATION ONLY (OUTPATIENT)
Dept: SURGERY | Facility: CLINIC | Age: 78
End: 2023-03-22

## 2023-03-22 DIAGNOSIS — C50.911 INVASIVE DUCTAL CARCINOMA OF BREAST, STAGE 1, RIGHT (H): Primary | ICD-10-CM

## 2023-03-22 PROCEDURE — G0463 HOSPITAL OUTPT CLINIC VISIT: HCPCS | Performed by: SURGERY

## 2023-03-22 PROCEDURE — 99024 POSTOP FOLLOW-UP VISIT: CPT | Performed by: SURGERY

## 2023-03-22 NOTE — LETTER
3/22/2023         RE: Cordelia Thorne  [address] [address]  [facility] [address]        Dear Colleague,    Thank you for referring your patient, Cordelia Thorne, to the [facility] [place]. Please see a copy of my visit note below.    History:  Cordelia Thorne is s/p right lumpectomy with LN biopsy on March 16.  She is doing well.  Continues to feel better each day.    Physical exam:  BREAST: Yellow and blue ecchymosis to the lower half of the right breast.  Right 12:00 incision has a pretty significant healing ridge, but no evidence of underlying or surrounding infection.    Pathology:  MICROSCOPIC AND DIAGNOSIS:   A) RIGHT BREAST, ORIENTED LUMPECTOMY:        1) INVASIVE DUCTAL CARCINOMA             a) Grade: Yamel grade II (of III)             b) Size:  25 x 20 x 10 mm (measured and calculated in slides)             c) Margins: Uninvolved, at 3 mm from the nearest medial   margin, and at 5 mm from the lateral margin        2) DUCTAL CARCINOMA IN SITU: EIC Negative             a) Nuclear grade: Intermediate             b) Patterns: Solid and cribriform, with focal microcalcifications             c) Size: 6 x 5 x 4 mm (measured and calculated in slides)   d) Margins: Uninvolved, at 3 mm from the nearest medial and lateral   margin, and at 4 mm from the superior margin        3) ADDITIONAL FINDINGS:   Abundant adipose tissue, with atrophic ductal and lobular units, and   weakly proliferative fibrocystic changes, with duct ectasia, apocrine   metaplasia, focal adenosis and sclerosing adenosis, and focal atypical   ductal hyperplasia        4) Previous biopsy site present, with cavity formation and organizing changes     B) RIGHT AXILLARY LYMPH NODE, BIOPSY:        - ONE BENIGN LYMPH NODE (0/1) WITH NO EVIDENCE OF METASTATIC CARCINOMA        - EXTENSIVE FAT INFILTRATES     PATHOLOGIC STAGE: pT2, pN0, pM-Not applicable     ASSESSMENT:  Cordelia Thorne is s/p right lumpectomy for invasive ductal  carcinoma    - Grade II, T2, N0, ER/GA+, HER2-  --> pathologic prognostic stage IA    PLAN:  Discussed typical healing.  Pathology was discussed.  Discussed Oncotype, which has been ordered (ERD not provided)  Referral placed for medical oncology.  She would like to see someone at [facility].  I think Dr. Joel would be good for her.  She should return to discuss radiation with Dr. Jaimes.  Continue with yearly mammograms  Return to the breast clinic in 1 year, or earlier as needed    Xenia Garcia DO  General Surgeon  [facility]  Breast Center [address] [address]  Office: [phone]  Employed by - [employer]        Again, thank you for allowing me to participate in the care of your patient.        Sincerely,        Xenia Garcia DO

## 2023-03-22 NOTE — PROGRESS NOTES
Faxed signed SOMN form and medical records including office visit notes from Dr. Garcia  to [employer] for patient's Oncotype PA.

## 2023-03-22 NOTE — PROGRESS NOTES
New Patient Oncology Nurse Navigator Note     Referring provider: Dr. Xenia Garcia     Referring Clinic/Organization: [facility]     Referred to (specialty:) Medical Oncology      Date Referral Received: March 22, 2023     Evaluation for:  Breast cancer     Clinical History (per Nurse review of records provided):      Marina had bilateral diagnostic mammograms and right breast ultrasound on 2/13/23 after presenting with a right breast mass and left breast calcifications demonstrated on recent baseline screening mammogram.     On mammogram there is persistence of an irregular, spiculated mass within the right breast at the 12:00 position, middle depth. There are predominantly round and punctate calcifications in a segmental distribution within the left breast spanning from the 1:00 through 2:00 positions, anterior-posterior depth measuring approximately 2.9 x 6.9 x 6.1 cm in total dimension. The remaining left breast parenchyma is unremarkable.  Targeted right breast ultrasound at the 12:00 position, 4 cm from the nipple demonstrates a 1.1 x 0.9 x 1.6 cm irregular, hypoechoic mass with spiculated margins and associated architectural distortion. There is mild associated internal vascularity. This finding correlates with the mass demonstrated on mammography.  Sonographic evaluation of the right axilla demonstrates a few nonenlarged and morphologically normal lymph nodes demonstrating retained fatty rober and thin cortices.    2/22/23 -   A. Right breast, 1.6 cm mass, 12:00, 4.0 cm from nipple, ultrasound guided 14 gauge needle core biopsies:  - Invasive mammary (ductal) carcinoma:  - [place] grade: 2 (out of 3)  - Yamel score: 6 (out of 9) (tubules-3; nuclear pleomorphism-2; mitoses-1)  - In situ carcinoma: Pending IHC p63  - ER+, IA+, HER2 negative by FISH  B. Left breast, 6.9 cm segmental calcifications, 1:00-2:00, anterior-posterior depth, stereotactic 9 gauge vacuum assisted needle  core biopsies:  - Benign breast tissue with :  - Focal columnar cell change and moderate usual ductal hyperplasia.  - Scattered intraductal microcalcifications    3/16/23 - Dr. Garcia  A) RIGHT BREAST, ORIENTED LUMPECTOMY:        1) INVASIVE DUCTAL CARCINOMA             a) Grade: [place] grade II (of III)             b) Size:  25 x 20 x 10 mm (measured and calculated in slides)             c) Margins: Uninvolved, at 3 mm from the nearest medial margin, and at 5 mm from the lateral margin        2) DUCTAL CARCINOMA IN SITU: EIC Negative             a) Nuclear grade: Intermediate             b) Patterns: Solid and cribriform, with focal microcalcifications             c) Size: 6 x 5 x 4 mm (measured and calculated in slides)             d) Margins: Uninvolved, at 3 mm from the nearest medial and lateral margin, and at 4 mm from the superior margin        3) ADDITIONAL FINDINGS: Abundant adipose tissue, with atrophic ductal and lobular units, and weakly proliferative fibrocystic changes, with duct ectasia, apocrine metaplasia, focal adenosis and sclerosing adenosis, and focal atypical ductal hyperplasia        4) Previous biopsy site present, with cavity formation and organizing changes     B) RIGHT AXILLARY LYMPH NODE, BIOPSY:        - ONE BENIGN LYMPH NODE (0/1) WITH NO EVIDENCE OF METASTATIC CARCINOMA        - EXTENSIVE FAT INFILTRATES     PATHOLOGIC STAGE: pT2, pN0, pM-Not applicable     Dr. Garcia has ordered an Oncotype and anticipated report date is approximately 4/7.  Oncotype subsequently reported on 3/29 at RS = 23.    Records Location: See Bookmarked material    3/23 11:19 - Telephoned and spoke with Marina to assist in scheduling medical oncology consult. She state her post office box is in [place], but she resides in [place] and would like to be seen in [place]. Dr. Garcia had recommended Dr. Carrasco. Writer received referral, reviewed for appropriate plan, and call transferred to New Patient  Scheduling for completion.

## 2023-03-22 NOTE — NURSING NOTE
Marina  presents to [facility] for a post op appointment with Dr. Garcia .  She isaccompanied by herself for appointment.  She states she is doing well, minimal pain.  She has good ROM, reviewed ROM exercises with her.  Patient met with Dr. Garcia .  See dictation for details of visit.  Oncotype has been ordered, results pending.  She will plan to be referred onto Medical Oncology and Radiation Oncology and will plan to follow up with Dr. Garcia  in one year with bilateral mammograms.  Invited calls sooner if she has any questions.  RN time 12 mins.

## 2023-03-30 ENCOUNTER — TELEPHONE (OUTPATIENT)
Dept: RADIATION ONCOLOGY | Facility: HOSPITAL | Age: 78
End: 2023-03-30
Payer: COMMERCIAL

## 2023-03-30 NOTE — TELEPHONE ENCOUNTER
Patient called with a few questions. She wanted to know if Dr. Jaimes recommended any specific MVI or deodorant. I told her I would ask Dr. Jaimes.     She had many medical questions about radiation which I answered to her satisfaction.     She is now wondering about timing of f/u with Dr. Jaimes and simulation. Dr. Jaimes, can you please enter SIM orders in [employer] and reply all here so our ICs know when to get her scheduled? She meet with Dr. Carrasco 4/12 which is a Wednesday. Oncotype is 23 per Dr. Garcia' nurse. Should we f/u and SIM that same day?

## 2023-04-04 ENCOUNTER — TELEPHONE (OUTPATIENT)
Dept: SURGERY | Facility: CLINIC | Age: 78
End: 2023-04-04
Payer: COMMERCIAL

## 2023-04-04 ENCOUNTER — TELEPHONE (OUTPATIENT)
Dept: RADIATION ONCOLOGY | Facility: HOSPITAL | Age: 78
End: 2023-04-04
Payer: COMMERCIAL

## 2023-04-04 NOTE — TELEPHONE ENCOUNTER
Patient called, wanted to review the ROM exercises and wondered how long to continue doing them. Reviewed these over the phone with her.  Told her to continue to perform the ROM exercises until she feels she is back at her baseline.  Support provided, invited calls.

## 2023-04-04 NOTE — TELEPHONE ENCOUNTER
Pt with more questions re:radiation. Would like a follow up appointment with SIM. I will discuss with Dr. Jaimes tomorrow and get back to patient by early afternoon. Pt appreciative.

## 2023-04-05 NOTE — TELEPHONE ENCOUNTER
Order in -- will talk about RT vs observation at follow-up  In terms of deodorant -  [email or web address] has information about brands and ingredients to avoid.  I'm currently liking Alva deodorant .  thanks

## 2023-04-12 ENCOUNTER — OFFICE VISIT (OUTPATIENT)
Dept: RADIATION ONCOLOGY | Facility: HOSPITAL | Age: 78
End: 2023-04-12
Attending: STUDENT IN AN ORGANIZED HEALTH CARE EDUCATION/TRAINING PROGRAM
Payer: COMMERCIAL

## 2023-04-12 ENCOUNTER — ONCOLOGY VISIT (OUTPATIENT)
Dept: ONCOLOGY | Facility: HOSPITAL | Age: 78
End: 2023-04-12
Attending: SURGERY
Payer: COMMERCIAL

## 2023-04-12 VITALS
RESPIRATION RATE: 20 BRPM | HEART RATE: 60 BPM | DIASTOLIC BLOOD PRESSURE: 57 MMHG | WEIGHT: 160.9 LBS | TEMPERATURE: 98.1 F | SYSTOLIC BLOOD PRESSURE: 175 MMHG | OXYGEN SATURATION: 98 % | BODY MASS INDEX: 27.47 KG/M2 | HEIGHT: 64 IN

## 2023-04-12 VITALS
RESPIRATION RATE: 20 BRPM | HEART RATE: 60 BPM | OXYGEN SATURATION: 98 % | SYSTOLIC BLOOD PRESSURE: 175 MMHG | BODY MASS INDEX: 26.08 KG/M2 | DIASTOLIC BLOOD PRESSURE: 57 MMHG | WEIGHT: 164 LBS | TEMPERATURE: 98.1 F

## 2023-04-12 DIAGNOSIS — C50.911 INVASIVE DUCTAL CARCINOMA OF BREAST, STAGE 1, RIGHT (H): ICD-10-CM

## 2023-04-12 DIAGNOSIS — C50.911 INVASIVE DUCTAL CARCINOMA OF BREAST, STAGE 1, RIGHT (H): Primary | ICD-10-CM

## 2023-04-12 DIAGNOSIS — M81.0 AGE-RELATED OSTEOPOROSIS WITHOUT CURRENT PATHOLOGICAL FRACTURE: Primary | ICD-10-CM

## 2023-04-12 PROCEDURE — 99205 OFFICE O/P NEW HI 60 MIN: CPT | Performed by: INTERNAL MEDICINE

## 2023-04-12 PROCEDURE — G0463 HOSPITAL OUTPT CLINIC VISIT: HCPCS | Performed by: INTERNAL MEDICINE

## 2023-04-12 PROCEDURE — G0463 HOSPITAL OUTPT CLINIC VISIT: HCPCS | Mod: 27 | Performed by: STUDENT IN AN ORGANIZED HEALTH CARE EDUCATION/TRAINING PROGRAM

## 2023-04-12 PROCEDURE — 99215 OFFICE O/P EST HI 40 MIN: CPT | Performed by: STUDENT IN AN ORGANIZED HEALTH CARE EDUCATION/TRAINING PROGRAM

## 2023-04-12 RX ORDER — MULTIVIT-MIN/IRON/FOLIC ACID/K 18-600-40
1 CAPSULE ORAL DAILY
COMMUNITY

## 2023-04-12 RX ORDER — DIMENHYDRINATE 50 MG
1 TABLET ORAL 2 TIMES DAILY
COMMUNITY
End: 2023-06-02 | Stop reason: ALTCHOICE

## 2023-04-12 RX ORDER — CYANOCOBALAMIN (VITAMIN B-12) 500 MCG
400 LOZENGE ORAL DAILY
COMMUNITY
End: 2023-12-06

## 2023-04-12 ASSESSMENT — PAIN SCALES - GENERAL
PAINLEVEL: NO PAIN (0)
PAINLEVEL: NO PAIN (0)

## 2023-04-12 NOTE — LETTER
4/12/2023         RE: Cordelia Thorne  [address] [address]  [facility] [address]        Dear Colleague,    Thank you for referring your patient, Cordelia Thorne, to the [facility] RADIATION ONCOLOGY [place]. Please see a copy of my visit note below.    [facility] Radiation Oncology Follow Up     Patient: Cordelia Thorne  MRN: [id number]  Date of Service: 04/12/2023       DIAGNOSIS: 77-year-old postmenopausal female with invasive ductal carcinoma of the right breast status postbiopsy   Cancer Staging   Invasive ductal carcinoma of breast, stage 1, right (H)  Staging form: Breast, AJCC 8th Edition  - Pathologic: Stage IA (pT2, pN0, cM0, G2, ER+, MD+, HER2-) - Signed by Cheyenne Jaimes MD on 4/12/2023      SUBJECTIVE:  Ms. Thorne is a 77 year old female who initially seen in consult 3/10/2023 please refer to that note for additional details.  Briefly,    2/8/2023 screening mammogram: Is a 1.5 cm spiculated mass within the right breast along the 12:00 radial.  On the left there is a loose grouping of nonspecific calcifications within the upper outer quadrant 5 cm from the nipple.  Diagnostic mammogram and targeted ultrasound recommended.  BI-RADS 0.     2/13/2023 diagnostic mammogram  Persistence of irregular spiculated mass within the right breast on o'clock position mild recommend targeted right breast ultrasound.  Probably round and punctate calcifications in the segmental distribution within the left breast brown in from the 1:00 to 2:00 positions, anterior posterior depth measuring 2.9 x 6.9 x 6.1 cm in total dimension.    Ultrasound: Right breast at the 12 o'clock position, 4 cm from the nipple demonstrates a 1.1 x 0.9 x 1.6 cm hypoechoic mass with spiculated margins and associated architectural distortion.  Right axilla evaluation demonstrates a few nonenlarged and morphologically normal lymph nodes  BI-RADS 5.     2/22/2023 ultrasound-guided core needle biopsy right breast  Invasive ductal carcinoma,  grade 2, ER positive (100%), WI positive (10 to 20%), HER2/davon equivocal by IHC, negative by FISH     2/22/2023 stereotactic vacuum-assisted left breast biopsy  Benign breast tissue focal columnar cell change and moderate usual ductal hyperplasia     Discussed lumpectomy with sentinel lymph node biopsy and potential adjuvant therapy.  Patient proceeded with breast conservation surgery.    3/16/2023 right breast lumpectomy sentinel lymph node biopsy, pathology ([id number]): Invasive ductal carcinoma, grade 2, 25 x 20 x 10 mm in size.  Margins negative (3 mm medial).  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  Margins negative (3 mm medial and lateral).  Right axillary lymph node benign (0/1).  Stage pT2, pN0.  Oncotype DX: 23.    She is recovered well since surgery aside for some bruising and swelling and returns to clinic today for follow-up for further consideration of adjuvant therapy.  She has multiple questions regarding adjuvant radiation as well as adjuvant endocrine therapy.  .  Medications were reviewed and are up to date on EPIC.    The following portions of the patient's history were reviewed and updated as appropriate: allergies, current medications, past family history, past medical history, past social history, past surgical history and problem list.    Review of Systems:      General  Constitutional  Constitutional (WDL): All constitutional elements are within defined limits  EENT  Eye Disorders  Eye Disorder (WDL): Assessment not pertinent to visit  Ear Disorders  Ear Disorder (WDL): Assessment not pertinent to visit  Respiratory  Respiratory  Respiratory (WDL): All respiratory elements are within defined limits  Cardiovascular  Cardiovascular  Cardiovascular (WDL): All cardiovascular elements are within defined limits  Gastrointestinal  Gastrointestinal  Gastrointestinal (WDL): All gastrointestinal elements are  within defined limits  Musculoskeletal  Musculoskeletal and Connective Tissue Disorders  Musculoskeletal & Connective (WDL): Exceptions to WDL  Arthralgia: Mild pain  Integumentary  Integumentary  Integumentary (WDL): All integumentary elements are within defined limits  Neurological  Neurosensory  Neurosensory (WDL): All neurosensory elements are within defined limits  Genitourinary/Reproductive  Genitourinary  Genitourinary (WDL): All genitourinary elements are within defined limits  Lymphatic  Lymph System Disorders  Lymph (WDL): All lymph elements are within defined limits  Pain  Pain Score: No Pain (0)  AUA Assessment                                                              Accompanied by  Accompanied By: self only    Objective:        PHYSICAL EXAMINATION:    BP (!) 175/57 (BP Location: Left arm, Patient Position: Sitting)   Pulse 60   Temp 98.1  F (36.7  C)   Resp 20   Wt 74.4 kg (164 lb)   SpO2 98%   BMI 26.08 kg/m      Gen: Alert, in NAD pleasant interactive, well nourished  Eyes: EOMI, sclera anicteric  HENT     Head: NC/AT  Pulm: No wheezing, stridor or respiratory distress  Chest:  Well-healing lumpectomy incision with underlying seroma and some bruising  Musculoskeletal: Normal muscle bulk and tone  Skin: Normal tone and turgor, wearing surgical gloves  Neurologic: A/Ox3, speech fluent, no focal motor deficits, gait normal and unaided  Psychiatric: Appropriate mood and affect        Imaging: Imaging results 6 weeks:Image Guided Breast Localization w Sent Node Inj Right    Result Date: 3/16/2023  INDICATION: Pre-operative localization of invasive ductal carcinoma at 12 o'clock, 4 cm from the nipple. PROCEDURE: Informed consent was obtained from the patient. The breast was cleansed with ChloraPrep. Lidocaine was used for local anesthesia. A -gauge needle was then advanced to the area of abnormality. A localization wire was then deployed. 531uCi Tc99m lymphoseek was injected subdermally along the  upper outer aspect of the areolar margin. Post-procedure mammograms demonstrate the localization wire in appropriate position. The previously placed marker was visualized. The patient tolerated this well.    IMPRESSION: Sonographically guided  wire localization. A specimen was sent for radiography. Specimen radiograph demonstrates the area of abnormality and the localization wire to be included in the specimen.     MA Post Procedure Right    Result Date: 3/16/2023  INDICATION: Pre-operative localization of invasive ductal carcinoma at 12 o'clock, 4 cm from the nipple. PROCEDURE: Informed consent was obtained from the patient. The breast was cleansed with ChloraPrep. Lidocaine was used for local anesthesia. A -gauge needle was then advanced to the area of abnormality. A localization wire was then deployed. 531uCi Tc99m lymphoseek was injected subdermally along the upper outer aspect of the areolar margin. Post-procedure mammograms demonstrate the localization wire in appropriate position. The previously placed marker was visualized. The patient tolerated this well.    IMPRESSION: Sonographically guided  wire localization. A specimen was sent for radiography. Specimen radiograph demonstrates the area of abnormality and the localization wire to be included in the specimen.     MA Breast Specimen Right    Result Date: 3/16/2023  INDICATION: Pre-operative localization of invasive ductal carcinoma at 12 o'clock, 4 cm from the nipple. PROCEDURE: Informed consent was obtained from the patient. The breast was cleansed with ChloraPrep. Lidocaine was used for local anesthesia. A -gauge needle was then advanced to the area of abnormality. A localization wire was then deployed. 531uCi Tc99m lymphoseek was injected subdermally along the upper outer aspect of the areolar margin. Post-procedure mammograms demonstrate the localization wire in appropriate position. The previously placed marker was visualized. The patient tolerated  this well.    IMPRESSION: Sonographically guided  wire localization. A specimen was sent for radiography. Specimen radiograph demonstrates the area of abnormality and the localization wire to be included in the specimen.     Pathology:  MICROSCOPIC AND DIAGNOSIS:   A) RIGHT BREAST, ORIENTED LUMPECTOMY:        1) INVASIVE DUCTAL CARCINOMA             a) Grade: Yamel grade II (of III)             b) Size:  25 x 20 x 10 mm (measured and calculated in slides)             c) Margins: Uninvolved, at 3 mm from the nearest medial   margin, and at 5 mm from the lateral margin        2) DUCTAL CARCINOMA IN SITU: EIC Negative             a) Nuclear grade: Intermediate             b) Patterns: Solid and cribriform, with focal   microcalcifications             c) Size: 6 x 5 x 4 mm (measured and calculated in slides)   d) Margins: Uninvolved, at 3 mm from the nearest medial and lateral   margin, and at 4 mm from the superior margin        3) ADDITIONAL FINDINGS:   Abundant adipose tissue, with atrophic ductal and lobular units, and   weakly proliferative fibrocystic changes, with duct ectasia, apocrine   metaplasia, focal adenosis and sclerosing adenosis, and focal atypical   ductal hyperplasia        4) Previous biopsy site present, with cavity formation and   organizing changes     B) RIGHT AXILLARY LYMPH NODE, BIOPSY:        - ONE BENIGN LYMPH NODE (0/1) WITH NO EVIDENCE OF METASTATIC   CARCINOMA        - EXTENSIVE FAT INFILTRATES     PATHOLOGIC STAGE: pT2, pN0, pM-Not applicable    Impression   77-year-old postmenopausal woman with invasive ductal carcinoma of the right breast, grade 2, ER/MI positive, HER2/davon negative status post lumpectomy and sentinel lymph node biopsy with negative margins.    Invasive ductal carcinoma of breast, stage 1, right (H) [C50.911]   Cancer Staging   Invasive ductal carcinoma of breast, stage 1, right (H)  Staging form: Breast, AJCC 8th Edition  - Pathologic: Stage IA (pT2, pN0, cM0, G2,  ER+, CT+, HER2-) - Signed by Cheyenne Jaimes MD on 4/12/2023      Assessment & Plan:     Discussed adjuvant radiation therapy with patient.    Reviewed data in older women with early-stage ER/CT positive breast cancer status postlumpectomy and sentinel lymph node biopsy.  Reviewed prior randomized studies and results.  While radiation reduces risk of local recurrence she is at very low risk of recurrence is given her favorable biology and good surgery.  Discussed adjuvant radiation versus skipping radiation and proceeding with adjuvant endocrine therapy alone.  The indications for, process of, alternatives, potential side effects and complications of RT to the right breast were discussed.    She asked multiple questions which were answered to her satisfaction and she verbalized understanding.       She is very concerned regarding the long-term potential side effects of radiation therapy including very small long-term risk of secondary malignancy.  Discussed avoidance of radiation therapy but patient would like to consider further prior to making any decisions.    She is scheduled to meet with heme-onc to discuss adjuvant endocrine therapy further.     She return to clinic next week for further discussion and consent with CT simulation should she wish to proceed.    Thank you for allowing me to participate in the care of this patient. Feel free to contact me with all questions or concerns.   Intent of Therapy: Curative  Patient on concurrent Herceptin No  Adjuvant hormonal therapy: TBD  Intended fractionation schedule: Should she pursue radiation therapy anticipate 2600 cGy in 5 fractions hypofractionation        Breast cancer risk factors:   No obstetric history on file.  LMP Dates from Last 4 Encounters:   No data found for LMP         Side effects that may occur during or within weeks after radiation therapy       Fatigue and general weakness    Darkening, irritation, itchiness, redness, dryness, erythema,  peeling, scabbing, ulceration and contraction of the skin of the breast and chest    Swelling of the breast    Loss of armpit hair    Lung irritation    Decrease in appetite     Side effects that may occur months or years after radiation therapy       Development of another tumor or cancer    Thickening, telangiectasias (development of spider like blood vessels in the skin) and ulceration of the skin of the breast and chest    Firming, fibrosis (scar tissue), fat necrosis, and distortion of the breast    Poor healing after a trauma or surgery in the irradiated area    Nerve damage resulting in loss of arm strength and sensation    Coronary artery blockage causing angina pain or a heart attack    Lung inflammation of fibrosis causing cough, fever and shortness of breath    Fracture of the ribs    Swellingof an arm and hand     The risks, benefits and alternatives to radiation therapy were outlined with the patient. All questions were answered and a consent was signed.     Total time of this visit, including time spent face-to-face with patient and or via video/audio, and also in preparing for today's visit for MDM and documentation. Medical decision-making included consideration and possible diagnoses, management options, complex record review, review of diagnostic tests and information, consideration and discussion of significant complications based on comorbidities, discussion with providers involved in the care of the patient.     Over 60 Minutes spent.        Cheyenne Jaimes MD  Department of Radiation Oncology   [facility]  Tel: [phone]  Page: [phone]    [facility]  [address] [place] [address]     [address] [place] [address]    CC:  Patient Care Team:  Shahid Ruggiero MD as PCP - General (Family Medicine)  Cheyenne Jaimes MD as MD (Radiation Oncology)  Xenia Garcia DO as MD (Surgery)  Xenia Garcia DO as Assigned Surgical  "Provider  Cheyenne Jaimes MD as Assigned Cancer Care Provider  Cory Carrasco MD as MD (Medical Oncology)    Oncology Rooming Note    April 12, 2023 10:01 AM   Cordelia Thorne is a 77 year old female who presents for:    Chief Complaint   Patient presents with     Oncology Clinic Visit     Breast Cancer     Follow up     Initial Vitals: BP (!) 175/57 (BP Location: Left arm, Patient Position: Sitting)   Pulse 60   Temp 98.1  F (36.7  C)   Resp 20   Wt 74.4 kg (164 lb)   SpO2 98%   BMI 26.08 kg/m   Estimated body mass index is 26.08 kg/m  as calculated from the following:    Height as of 3/16/23: 1.689 m (5' 6.5\").    Weight as of this encounter: 74.4 kg (164 lb). Body surface area is 1.87 meters squared.  No Pain (0) Comment: Data Unavailable   No LMP recorded. Patient is postmenopausal.  Allergies reviewed: Yes  Medications reviewed: Yes    Medications: Medication refills not needed today.  Pharmacy name entered into [employer]:    [id number] [place], [place] - [address] AT [facility] [place], [place] - [address] [place] [address]    Clinical concerns: follow up, possible SIM, pt very anxious, very concerned about infections, brought her own thermometer, wearing latex gloves and belongings in a bag. Dr. Jaimes was notified.      Mora David RN                Again, thank you for allowing me to participate in the care of your patient.        Sincerely,        Cheyenne Jaimes MD    "

## 2023-04-12 NOTE — PROGRESS NOTES
[facility] Consult Note    Patient: Cordelia Thorne  MRN: [id number]  Date of Service: 04/12/2023      Reason for Visit    Chief Complaint   Patient presents with     Oncology Clinic Visit     New Patient - Invasive ductal carcinoma of breast, stage 1, right         Assessment/Plan    Problem List Items Addressed This Visit        Other    Invasive ductal carcinoma of breast, stage 1, right (H)   Other Visit Diagnoses     Age-related osteoporosis without current pathological fracture    -  Primary    Relevant Medications    Calcium Carbonate-Vitamin D (CALCIUM-VITAMIN D3) 600-3.125 MG-MCG TABS    vitamin E 400 units TABS    Other Relevant Orders    DX Hip/Pelvis/Spine        Early right-sided breast cancer status postlumpectomy, strongly ER positive, moderately SD positive and HER2 negative by FISH  I reviewed the diagnosis of hormone receptor positive and HER2 negative early breast cancer with her in detail today.  I also reviewed the pathology reports, imaging reports and Oncotype DX score in detail today.    Her tumor measured 2.5 cm in the greatest dimension.  1 sentinel lymph node was removed which was negative for malignancy.  Pathologic prognostic stage Ia.  Oncotype score came back at 23 which predicts no major benefit from systemic chemotherapy in her age group.  Endocrine therapy after radiation is sufficient.  She has already met with radiation oncology and is contemplating a short course radiation for 5 days.  I explained to her that total duration of endocrine treatment would be 5 years.  I reviewed the rationale behind this.  I reviewed various endocrine therapy options in detail today.  Considering her postmenopausal state and aromatase inhibitor would be our preference.  Arimidex versus letrozole.  I reviewed the potential side effects and complications associated with aromatase inhibitors.  She has no contraindications for this.  She will need a DEXA scan prior to  starting treatment.  Also recommend taking vitamin D and calcium supplementation if she is not already doing so.  I would start endocrine therapy after she finishes radiation.  Surveillance will include physical exam 3-4 times a year for the first year or 2 followed by every 6 months up to year 5.  Yearly mammograms.    ECOG Performance    0 - Independent    Problem List    Patient Active Problem List   Diagnosis     Vertigo     Other specified phobia     Invasive ductal carcinoma of breast, stage 1, right (H)     ______________________________________________________________________________    Staging History     Cancer Staging   Invasive ductal carcinoma of breast, stage 1, right (H)  Staging form: Breast, AJCC 8th Edition  - Pathologic: Stage IA (pT2, pN0, cM0, G2, ER+, PA+, HER2-) - Signed by Cheyenne Jaimes MD on 4/12/2023        History of presenting illness:  Cordelia Thorne is a 77-year-old female with a new diagnosis of right-sided breast cancer status postlumpectomy who is here to discuss adjuvant endocrine therapy for the same.    Recently is getting mammogram picked up an abnormality in her right breast.  Diagnostic mammogram confirmed abnormality in the right breast between 1-2 o'clock positions.  Targeted ultrasound in the right breast showed a 1.1 x 0.9 x 1.6 cm hypoechoic spiculated mass at the 12 o'clock position about 4 cm from the nipple.  She also had some calcifications the left breast.  She underwent needle biopsy of both the right breast mass and left breast abnormality.  Right breast biopsy came back showing invasive ductal carcinoma.  Grade 2.  Strongly ER positive, moderately PA positive and HER2 equivocal by IHC with a 2+ score and negative by FISH.  Left breast biopsy did not show any evidence of malignancy.  She underwent lumpectomy on 3/16/2023.  Surgical path showed a 2.5 x 2 x 1 cm, grade 2 invasive ductal carcinoma.  All margins were clear.  She also had DCIS measuring 6 x 5 x 4  mm.  1 sentinel lymph node was removed which was negative for metastatic disease.  No evidence of LVI.  Oncotype DX score was sent and came back at 23.  She has been scheduled to start radiation in the near future.      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, see notes, advised treatment    Family History   Problem Relation Age of Onset     Heart Disease Mother       Past Surgical History:   Procedure Laterality Date     LUMPECTOMY BREAST Right 3/16/2023    Procedure: BREAST WIRE LOCALIZED LUMPECTOMY WITH SENTINEL LYMPH NODE BIOPSY;  Surgeon: Xenia Garcia DO;  Location: [facility]     TONSILLECTOMY & ADENOIDECTOMY      Social History     Socioeconomic History     Marital status:      Spouse name: Not on file     Number of children: Not on file     Years of education: Not on file     Highest education level: Not on file   Occupational History     Not on file   Tobacco Use     Smoking status: Never     Passive exposure: Past     Smokeless tobacco: Never   Vaping Use     Vaping status: Not on file   Substance and Sexual Activity     Alcohol use: Not on file     Drug use: Never     Sexual activity: Not on file   Other Topics Concern     Not on file   Social History Narrative     Not on file     Social Determinants of Health     Financial Resource Strain: Not on file   Food Insecurity: Not on file   Transportation Needs: Not on file   Physical Activity: Not on file   Stress: Not on file   Social Connections: Not on file   Intimate Partner Violence: Not on file   Housing Stability: Not on file        Allergies    Allergies   Allergen Reactions     Amoxicillin Diarrhea and Unknown     Vaginal yeast infection      Penicillins Diarrhea     Vaginal yeast infection      Monosodium Glutamate Other (See Comments)       Review of Systems    Pertinent items are noted in HPI.      Physical  Exam        4/12/2023    11:06 AM   Oncology Vitals   Height 162 cm   Weight 72.984 kg   BSA (m2) 1.81 m2   /57   Pulse 60   Temp 98.1  F (36.7  C)   Temp src Oral   SpO2 98 %   Pain Score 0 (None)       General: alert and cooperative  HEENT: Head: Normal, normocephalic, atraumatic.  Eye: Normal external eye, conjunctiva, lids cornea, VERONIKA.  CNS: Alert and oriented x3, neurologic exam grossly normal.        Lab Results    No results found for this or any previous visit (from the past 168 hour(s)).    Imaging Results    Image Guided Breast Localization w Sent Node Inj Right    Result Date: 3/16/2023  INDICATION: Pre-operative localization of invasive ductal carcinoma at 12 o'clock, 4 cm from the nipple. PROCEDURE: Informed consent was obtained from the patient. The breast was cleansed with ChloraPrep. Lidocaine was used for local anesthesia. A -gauge needle was then advanced to the area of abnormality. A localization wire was then deployed. 531uCi Tc99m lymphoseek was injected subdermally along the upper outer aspect of the areolar margin. Post-procedure mammograms demonstrate the localization wire in appropriate position. The previously placed marker was visualized. The patient tolerated this well.    IMPRESSION: Sonographically guided  wire localization. A specimen was sent for radiography. Specimen radiograph demonstrates the area of abnormality and the localization wire to be included in the specimen.     MA Post Procedure Right    Result Date: 3/16/2023  INDICATION: Pre-operative localization of invasive ductal carcinoma at 12 o'clock, 4 cm from the nipple. PROCEDURE: Informed consent was obtained from the patient. The breast was cleansed with ChloraPrep. Lidocaine was used for local anesthesia. A -gauge needle was then advanced to the area of abnormality. A localization wire was then deployed. 531uCi Tc99m lymphoseek was injected subdermally along the upper outer aspect of the areolar margin.  Post-procedure mammograms demonstrate the localization wire in appropriate position. The previously placed marker was visualized. The patient tolerated this well.    IMPRESSION: Sonographically guided  wire localization. A specimen was sent for radiography. Specimen radiograph demonstrates the area of abnormality and the localization wire to be included in the specimen.     MA Breast Specimen Right    Result Date: 3/16/2023  INDICATION: Pre-operative localization of invasive ductal carcinoma at 12 o'clock, 4 cm from the nipple. PROCEDURE: Informed consent was obtained from the patient. The breast was cleansed with ChloraPrep. Lidocaine was used for local anesthesia. A -gauge needle was then advanced to the area of abnormality. A localization wire was then deployed. 531uCi Tc99m lymphoseek was injected subdermally along the upper outer aspect of the areolar margin. Post-procedure mammograms demonstrate the localization wire in appropriate position. The previously placed marker was visualized. The patient tolerated this well.    IMPRESSION: Sonographically guided  wire localization. A specimen was sent for radiography. Specimen radiograph demonstrates the area of abnormality and the localization wire to be included in the specimen.       A total of 60 minutes was spent today on this visit including face to face conversation with the patient, EMR review (labs, imaging studies, pathology reports and outside records), counseling and care co-ordination and documentation.    Signed by: Cory Carrasco MD

## 2023-04-12 NOTE — PROGRESS NOTES
"Oncology Rooming Note    April 12, 2023 11:22 AM   Cordelia Thorne is a 77 year old female who presents for:    Chief Complaint   Patient presents with     Oncology Clinic Visit     New Patient - Invasive ductal carcinoma of breast, stage 1, right     Initial Vitals: BP (!) 175/57 (BP Location: Left arm)   Pulse 60   Temp 98.1  F (36.7  C) (Oral)   Resp 20   Ht 1.619 m (5' 3.75\")   Wt 73 kg (160 lb 14.4 oz)   SpO2 98%   BMI 27.84 kg/m   Estimated body mass index is 27.84 kg/m  as calculated from the following:    Height as of this encounter: 1.619 m (5' 3.75\").    Weight as of this encounter: 73 kg (160 lb 14.4 oz). Body surface area is 1.81 meters squared.  No Pain (0) Comment: Data Unavailable   No LMP recorded. Patient is postmenopausal.  Allergies reviewed: Yes  Medications reviewed: Yes    Medications: Medication refills not needed today.  Pharmacy [facility] entered into [facility]: [id number] [address] AT [facility]    Clinical concerns: New Patient - Invasive ductal carcinoma of breast, stage 1, right.       Geni Penaloza CMA            "

## 2023-04-12 NOTE — PROGRESS NOTES
[facility] Radiation Oncology Follow Up     Patient: Cordelia Thorne  MRN: [id number]  Date of Service: 04/12/2023       DIAGNOSIS: 77-year-old postmenopausal female with invasive ductal carcinoma of the right breast status postbiopsy   Cancer Staging   Invasive ductal carcinoma of breast, stage 1, right (H)  Staging form: Breast, AJCC 8th Edition  - Pathologic: Stage IA (pT2, pN0, cM0, G2, ER+, NV+, HER2-) - Signed by Cheyenne Jaimes MD on 4/12/2023      SUBJECTIVE:  Ms. Thorne is a 77 year old female who initially seen in consult 3/10/2023 please refer to that note for additional details.  Briefly,    2/8/2023 screening mammogram: Is a 1.5 cm spiculated mass within the right breast along the 12:00 radial.  On the left there is a loose grouping of nonspecific calcifications within the upper outer quadrant 5 cm from the nipple.  Diagnostic mammogram and targeted ultrasound recommended.  BI-RADS 0.     2/13/2023 diagnostic mammogram  Persistence of irregular spiculated mass within the right breast on o'clock position mild recommend targeted right breast ultrasound.  Probably round and punctate calcifications in the segmental distribution within the left breast brown in from the 1:00 to 2:00 positions, anterior posterior depth measuring 2.9 x 6.9 x 6.1 cm in total dimension.    Ultrasound: Right breast at the 12 o'clock position, 4 cm from the nipple demonstrates a 1.1 x 0.9 x 1.6 cm hypoechoic mass with spiculated margins and associated architectural distortion.  Right axilla evaluation demonstrates a few nonenlarged and morphologically normal lymph nodes  BI-RADS 5.     2/22/2023 ultrasound-guided core needle biopsy right breast  Invasive ductal carcinoma, grade 2, ER positive (100%), NV positive (10 to 20%), HER2/davon equivocal by IHC, negative by FISH     2/22/2023 stereotactic vacuum-assisted left breast biopsy  Benign breast tissue focal columnar cell change and moderate usual ductal  hyperplasia     Discussed lumpectomy with sentinel lymph node biopsy and potential adjuvant therapy.  Patient proceeded with breast conservation surgery.    3/16/2023 right breast lumpectomy sentinel lymph node biopsy, pathology ([id number]): Invasive ductal carcinoma, grade 2, 25 x 20 x 10 mm in size.  Margins negative (3 mm medial).  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  Margins negative (3 mm medial and lateral).  Right axillary lymph node benign (0/1).  Stage pT2, pN0.  Oncotype DX: 23.    She is recovered well since surgery aside for some bruising and swelling and returns to clinic today for follow-up for further consideration of adjuvant therapy.  She has multiple questions regarding adjuvant radiation as well as adjuvant endocrine therapy.  .  Medications were reviewed and are up to date on EPIC.    The following portions of the patient's history were reviewed and updated as appropriate: allergies, current medications, past family history, past medical history, past social history, past surgical history and problem list.    Review of Systems:      General  Constitutional  Constitutional (WDL): All constitutional elements are within defined limits  EENT  Eye Disorders  Eye Disorder (WDL): Assessment not pertinent to visit  Ear Disorders  Ear Disorder (WDL): Assessment not pertinent to visit  Respiratory  Respiratory  Respiratory (WDL): All respiratory elements are within defined limits  Cardiovascular  Cardiovascular  Cardiovascular (WDL): All cardiovascular elements are within defined limits  Gastrointestinal  Gastrointestinal  Gastrointestinal (WDL): All gastrointestinal elements are within defined limits  Musculoskeletal  Musculoskeletal and Connective Tissue Disorders  Musculoskeletal & Connective (WDL): Exceptions to WDL  Arthralgia: Mild pain  Integumentary  Integumentary  Integumentary (WDL): All integumentary  elements are within defined limits  Neurological  Neurosensory  Neurosensory (WDL): All neurosensory elements are within defined limits  Genitourinary/Reproductive  Genitourinary  Genitourinary (WDL): All genitourinary elements are within defined limits  Lymphatic  Lymph System Disorders  Lymph (WDL): All lymph elements are within defined limits  Pain  Pain Score: No Pain (0)  AUA Assessment                                                              Accompanied by  Accompanied By: self only    Objective:        PHYSICAL EXAMINATION:    BP (!) 175/57 (BP Location: Left arm, Patient Position: Sitting)   Pulse 60   Temp 98.1  F (36.7  C)   Resp 20   Wt 74.4 kg (164 lb)   SpO2 98%   BMI 26.08 kg/m      Gen: Alert, in NAD pleasant interactive, well nourished  Eyes: EOMI, sclera anicteric  HENT     Head: NC/AT  Pulm: No wheezing, stridor or respiratory distress  Chest:  Well-healing lumpectomy incision with underlying seroma and some bruising  Musculoskeletal: Normal muscle bulk and tone  Skin: Normal tone and turgor, wearing surgical gloves  Neurologic: A/Ox3, speech fluent, no focal motor deficits, gait normal and unaided  Psychiatric: Appropriate mood and affect        Imaging: Imaging results 6 weeks:Image Guided Breast Localization w Sent Node Inj Right    Result Date: 3/16/2023  INDICATION: Pre-operative localization of invasive ductal carcinoma at 12 o'clock, 4 cm from the nipple. PROCEDURE: Informed consent was obtained from the patient. The breast was cleansed with ChloraPrep. Lidocaine was used for local anesthesia. A -gauge needle was then advanced to the area of abnormality. A localization wire was then deployed. 531uCi Tc99m lymphoseek was injected subdermally along the upper outer aspect of the areolar margin. Post-procedure mammograms demonstrate the localization wire in appropriate position. The previously placed marker was visualized. The patient tolerated this well.    IMPRESSION: Sonographically  guided  wire localization. A specimen was sent for radiography. Specimen radiograph demonstrates the area of abnormality and the localization wire to be included in the specimen.     MA Post Procedure Right    Result Date: 3/16/2023  INDICATION: Pre-operative localization of invasive ductal carcinoma at 12 o'clock, 4 cm from the nipple. PROCEDURE: Informed consent was obtained from the patient. The breast was cleansed with ChloraPrep. Lidocaine was used for local anesthesia. A -gauge needle was then advanced to the area of abnormality. A localization wire was then deployed. 531uCi Tc99m lymphoseek was injected subdermally along the upper outer aspect of the areolar margin. Post-procedure mammograms demonstrate the localization wire in appropriate position. The previously placed marker was visualized. The patient tolerated this well.    IMPRESSION: Sonographically guided  wire localization. A specimen was sent for radiography. Specimen radiograph demonstrates the area of abnormality and the localization wire to be included in the specimen.     MA Breast Specimen Right    Result Date: 3/16/2023  INDICATION: Pre-operative localization of invasive ductal carcinoma at 12 o'clock, 4 cm from the nipple. PROCEDURE: Informed consent was obtained from the patient. The breast was cleansed with ChloraPrep. Lidocaine was used for local anesthesia. A -gauge needle was then advanced to the area of abnormality. A localization wire was then deployed. 531uCi Tc99m lymphoseek was injected subdermally along the upper outer aspect of the areolar margin. Post-procedure mammograms demonstrate the localization wire in appropriate position. The previously placed marker was visualized. The patient tolerated this well.    IMPRESSION: Sonographically guided  wire localization. A specimen was sent for radiography. Specimen radiograph demonstrates the area of abnormality and the localization wire to be included in the specimen.      Pathology:  MICROSCOPIC AND DIAGNOSIS:   A) RIGHT BREAST, ORIENTED LUMPECTOMY:        1) INVASIVE DUCTAL CARCINOMA             a) Grade: Aymel grade II (of III)             b) Size:  25 x 20 x 10 mm (measured and calculated in slides)             c) Margins: Uninvolved, at 3 mm from the nearest medial   margin, and at 5 mm from the lateral margin        2) DUCTAL CARCINOMA IN SITU: EIC Negative             a) Nuclear grade: Intermediate             b) Patterns: Solid and cribriform, with focal   microcalcifications             c) Size: 6 x 5 x 4 mm (measured and calculated in slides)   d) Margins: Uninvolved, at 3 mm from the nearest medial and lateral   margin, and at 4 mm from the superior margin        3) ADDITIONAL FINDINGS:   Abundant adipose tissue, with atrophic ductal and lobular units, and   weakly proliferative fibrocystic changes, with duct ectasia, apocrine   metaplasia, focal adenosis and sclerosing adenosis, and focal atypical   ductal hyperplasia        4) Previous biopsy site present, with cavity formation and   organizing changes     B) RIGHT AXILLARY LYMPH NODE, BIOPSY:        - ONE BENIGN LYMPH NODE (0/1) WITH NO EVIDENCE OF METASTATIC   CARCINOMA        - EXTENSIVE FAT INFILTRATES     PATHOLOGIC STAGE: pT2, pN0, pM-Not applicable    Impression   77-year-old postmenopausal woman with invasive ductal carcinoma of the right breast, grade 2, ER/VA positive, HER2/davon negative status post lumpectomy and sentinel lymph node biopsy with negative margins.    Invasive ductal carcinoma of breast, stage 1, right (H) [C50.911]   Cancer Staging   Invasive ductal carcinoma of breast, stage 1, right (H)  Staging form: Breast, AJCC 8th Edition  - Pathologic: Stage IA (pT2, pN0, cM0, G2, ER+, VA+, HER2-) - Signed by Cheyenne Jaimes MD on 4/12/2023      Assessment & Plan:     Discussed adjuvant radiation therapy with patient.    Reviewed data in older women with early-stage ER/VA positive breast cancer  status postlumpectomy and sentinel lymph node biopsy.  Reviewed prior randomized studies and results.  While radiation reduces risk of local recurrence she is at very low risk of recurrence is given her favorable biology and good surgery.  Discussed adjuvant radiation versus skipping radiation and proceeding with adjuvant endocrine therapy alone.  The indications for, process of, alternatives, potential side effects and complications of RT to the right breast were discussed.    She asked multiple questions which were answered to her satisfaction and she verbalized understanding.       She is very concerned regarding the long-term potential side effects of radiation therapy including very small long-term risk of secondary malignancy.  Discussed avoidance of radiation therapy but patient would like to consider further prior to making any decisions.    She is scheduled to meet with heme-onc to discuss adjuvant endocrine therapy further.     She return to clinic next week for further discussion and consent with CT simulation should she wish to proceed.    Thank you for allowing me to participate in the care of this patient. Feel free to contact me with all questions or concerns.   Intent of Therapy: Curative  Patient on concurrent Herceptin No  Adjuvant hormonal therapy: TBD  Intended fractionation schedule: Should she pursue radiation therapy anticipate 2600 cGy in 5 fractions hypofractionation        Breast cancer risk factors:   No obstetric history on file.  LMP Dates from Last 4 Encounters:   No data found for LMP         Side effects that may occur during or within weeks after radiation therapy       Fatigue and general weakness    Darkening, irritation, itchiness, redness, dryness, erythema, peeling, scabbing, ulceration and contraction of the skin of the breast and chest    Swelling of the breast    Loss of armpit hair    Lung irritation    Decrease in appetite     Side effects that may occur months or years  after radiation therapy       Development of another tumor or cancer    Thickening, telangiectasias (development of spider like blood vessels in the skin) and ulceration of the skin of the breast and chest    Firming, fibrosis (scar tissue), fat necrosis, and distortion of the breast    Poor healing after a trauma or surgery in the irradiated area    Nerve damage resulting in loss of arm strength and sensation    Coronary artery blockage causing angina pain or a heart attack    Lung inflammation of fibrosis causing cough, fever and shortness of breath    Fracture of the ribs    Swellingof an arm and hand     The risks, benefits and alternatives to radiation therapy were outlined with the patient. All questions were answered and a consent was signed.     Total time of this visit, including time spent face-to-face with patient and or via video/audio, and also in preparing for today's visit for MDM and documentation. Medical decision-making included consideration and possible diagnoses, management options, complex record review, review of diagnostic tests and information, consideration and discussion of significant complications based on comorbidities, discussion with providers involved in the care of the patient.     Over 60 Minutes spent.        Cheyenne Jaimes MD  Department of Radiation Oncology   [facility]  Tel: [phone]  Page: [phone]    [facility]  [address] [place] [address]     [address]   [place] [address]    CC:  Patient Care Team:  Shahid Ruggiero MD as PCP - General (Family Medicine)  Cheyenne Jaimes MD as MD (Radiation Oncology)  Xenia Garcia DO as MD (Surgery)  Xenia Garcia DO as Assigned Surgical Provider  Cheyenne Jaimes MD as Assigned Cancer Care Provider  Cory Carrasco MD as MD (Medical Oncology)

## 2023-04-12 NOTE — LETTER
4/12/2023         RE: Cordelia Thorne  [address] [id number]  [facility] [address]        Dear Colleague,    Thank you for referring your patient, Cordelia Thorne, to the [facility] [place]. Please see a copy of my visit note below.    [facility] H[facility]ology and Oncology Consult Note    Patient: Cordelia Thorne  MRN: [id number]  Date of Service: 04/12/2023      Reason for Visit    Chief Complaint   Patient presents with     Oncology Clinic Visit     New Patient - Invasive ductal carcinoma of breast, stage 1, right         Assessment/Plan    Problem List Items Addressed This Visit        Other    Invasive ductal carcinoma of breast, stage 1, right (H)   Other Visit Diagnoses     Age-related osteoporosis without current pathological fracture    -  Primary    Relevant Medications    Calcium Carbonate-Vitamin D (CALCIUM-VITAMIN D3) 600-3.125 MG-MCG TABS    vitamin E 400 units TABS    Other Relevant Orders    DX Hip/Pelvis/Spine        Early right-sided breast cancer status postlumpectomy, strongly ER positive, moderately AZ positive and HER2 negative by FISH  I reviewed the diagnosis of hormone receptor positive and HER2 negative early breast cancer with her in detail today.  I also reviewed the pathology reports, imaging reports and Oncotype DX score in detail today.    Her tumor measured 2.5 cm in the greatest dimension.  1 sentinel lymph node was removed which was negative for malignancy.  Pathologic prognostic stage Ia.  Oncotype score came back at 23 which predicts no major benefit from systemic chemotherapy in her age group.  Endocrine therapy after radiation is sufficient.  She has already met with radiation oncology and is contemplating a short course radiation for 5 days.  I explained to her that total duration of endocrine treatment would be 5 years.  I reviewed the rationale behind this.  I reviewed various endocrine therapy options in detail today.  Considering her postmenopausal state and  aromatase inhibitor would be our preference.  Arimidex versus letrozole.  I reviewed the potential side effects and complications associated with aromatase inhibitors.  She has no contraindications for this.  She will need a DEXA scan prior to starting treatment.  Also recommend taking vitamin D and calcium supplementation if she is not already doing so.  I would start endocrine therapy after she finishes radiation.  Surveillance will include physical exam 3-4 times a year for the first year or 2 followed by every 6 months up to year 5.  Yearly mammograms.    ECOG Performance    0 - Independent    Problem List    Patient Active Problem List   Diagnosis     Vertigo     Other specified phobia     Invasive ductal carcinoma of breast, stage 1, right (H)     ______________________________________________________________________________    Staging History     Cancer Staging   Invasive ductal carcinoma of breast, stage 1, right (H)  Staging form: Breast, AJCC 8th Edition  - Pathologic: Stage IA (pT2, pN0, cM0, G2, ER+, TX+, HER2-) - Signed by Cheyenne Jaimes MD on 4/12/2023        History of presenting illness:  Cordelia Thorne is a 77-year-old female with a new diagnosis of right-sided breast cancer status postlumpectomy who is here to discuss adjuvant endocrine therapy for the same.    Recently is getting mammogram picked up an abnormality in her right breast.  Diagnostic mammogram confirmed abnormality in the right breast between 1-2 o'clock positions.  Targeted ultrasound in the right breast showed a 1.1 x 0.9 x 1.6 cm hypoechoic spiculated mass at the 12 o'clock position about 4 cm from the nipple.  She also had some calcifications the left breast.  She underwent needle biopsy of both the right breast mass and left breast abnormality.  Right breast biopsy came back showing invasive ductal carcinoma.  Grade 2.  Strongly ER positive, moderately TX positive and HER2 equivocal by IHC with a 2+ score and negative by  FISH.  Left breast biopsy did not show any evidence of malignancy.  She underwent lumpectomy on 3/16/2023.  Surgical path showed a 2.5 x 2 x 1 cm, grade 2 invasive ductal carcinoma.  All margins were clear.  She also had DCIS measuring 6 x 5 x 4 mm.  1 sentinel lymph node was removed which was negative for metastatic disease.  No evidence of LVI.  Oncotype DX score was sent and came back at 23.  She has been scheduled to start radiation in the near future.      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, see notes, advised treatment    Family History   Problem Relation Age of Onset     Heart Disease Mother       Past Surgical History:   Procedure Laterality Date     LUMPECTOMY BREAST Right 3/16/2023    Procedure: BREAST WIRE LOCALIZED LUMPECTOMY WITH SENTINEL LYMPH NODE BIOPSY;  Surgeon: Xenia Garcia DO;  Location: [facility] OR     TONSILLECTOMY & ADENOIDECTOMY      Social History     Socioeconomic History     Marital status:      Spouse name: Not on file     Number of children: Not on file     Years of education: Not on file     Highest education level: Not on file   Occupational History     Not on file   Tobacco Use     Smoking status: Never     Passive exposure: Past     Smokeless tobacco: Never   Vaping Use     Vaping status: Not on file   Substance and Sexual Activity     Alcohol use: Not on file     Drug use: Never     Sexual activity: Not on file   Other Topics Concern     Not on file   Social History Narrative     Not on file     Social Determinants of Health     Financial Resource Strain: Not on file   Food Insecurity: Not on file   Transportation Needs: Not on file   Physical Activity: Not on file   Stress: Not on file   Social Connections: Not on file   Intimate Partner Violence: Not on file   Housing Stability: Not on file        Allergies    Allergies   Allergen  Reactions     Amoxicillin Diarrhea and Unknown     Vaginal yeast infection      Penicillins Diarrhea     Vaginal yeast infection      Monosodium Glutamate Other (See Comments)       Review of Systems    Pertinent items are noted in HPI.      Physical Exam        4/12/2023    11:06 AM   Oncology Vitals   Height 162 cm   Weight 72.984 kg   BSA (m2) 1.81 m2   /57   Pulse 60   Temp 98.1  F (36.7  C)   Temp src Oral   SpO2 98 %   Pain Score 0 (None)       General: alert and cooperative  HEENT: Head: Normal, normocephalic, atraumatic.  Eye: Normal external eye, conjunctiva, lids cornea, VERONIKA.  CNS: Alert and oriented x3, neurologic exam grossly normal.        Lab Results    No results found for this or any previous visit (from the past 168 hour(s)).    Imaging Results    Image Guided Breast Localization w Sent Node Inj Right    Result Date: 3/16/2023  INDICATION: Pre-operative localization of invasive ductal carcinoma at 12 o'clock, 4 cm from the nipple. PROCEDURE: Informed consent was obtained from the patient. The breast was cleansed with ChloraPrep. Lidocaine was used for local anesthesia. A -gauge needle was then advanced to the area of abnormality. A localization wire was then deployed. 531uCi Tc99m lymphoseek was injected subdermally along the upper outer aspect of the areolar margin. Post-procedure mammograms demonstrate the localization wire in appropriate position. The previously placed marker was visualized. The patient tolerated this well.    IMPRESSION: Sonographically guided  wire localization. A specimen was sent for radiography. Specimen radiograph demonstrates the area of abnormality and the localization wire to be included in the specimen.     MA Post Procedure Right    Result Date: 3/16/2023  INDICATION: Pre-operative localization of invasive ductal carcinoma at 12 o'clock, 4 cm from the nipple. PROCEDURE: Informed consent was obtained from the patient. The breast was cleansed with ChloraPrep.  Lidocaine was used for local anesthesia. A -gauge needle was then advanced to the area of abnormality. A localization wire was then deployed. 531uCi Tc99m lymphoseek was injected subdermally along the upper outer aspect of the areolar margin. Post-procedure mammograms demonstrate the localization wire in appropriate position. The previously placed marker was visualized. The patient tolerated this well.    IMPRESSION: Sonographically guided  wire localization. A specimen was sent for radiography. Specimen radiograph demonstrates the area of abnormality and the localization wire to be included in the specimen.     MA Breast Specimen Right    Result Date: 3/16/2023  INDICATION: Pre-operative localization of invasive ductal carcinoma at 12 o'clock, 4 cm from the nipple. PROCEDURE: Informed consent was obtained from the patient. The breast was cleansed with ChloraPrep. Lidocaine was used for local anesthesia. A -gauge needle was then advanced to the area of abnormality. A localization wire was then deployed. 531uCi Tc99m lymphoseek was injected subdermally along the upper outer aspect of the areolar margin. Post-procedure mammograms demonstrate the localization wire in appropriate position. The previously placed marker was visualized. The patient tolerated this well.    IMPRESSION: Sonographically guided  wire localization. A specimen was sent for radiography. Specimen radiograph demonstrates the area of abnormality and the localization wire to be included in the specimen.       A total of 60 minutes was spent today on this visit including face to face conversation with the patient, EMR review (labs, imaging studies, pathology reports and outside records), counseling and care co-ordination and documentation.    Signed by: Cory Carrasco MD      Oncology Rooming Note    April 12, 2023 11:22 AM   Cordelia Thorne is a 77 year old female who presents for:    Chief Complaint   Patient presents with     Oncology Clinic  "Visit     New Patient - Invasive ductal carcinoma of breast, stage 1, right     Initial Vitals: BP (!) 175/57 (BP Location: Left arm)   Pulse 60   Temp 98.1  F (36.7  C) (Oral)   Resp 20   Ht 1.619 m (5' 3.75\")   Wt 73 kg (160 lb 14.4 oz)   SpO2 98%   BMI 27.84 kg/m   Estimated body mass index is 27.84 kg/m  as calculated from the following:    Height as of this encounter: 1.619 m (5' 3.75\").    Weight as of this encounter: 73 kg (160 lb 14.4 oz). Body surface area is 1.81 meters squared.  No Pain (0) Comment: Data Unavailable   No LMP recorded. Patient is postmenopausal.  Allergies reviewed: Yes  Medications reviewed: Yes    Medications: Medication refills not needed today.  Pharmacy name entered into Pockethernet: [id number] [address] AT [facility]    Clinical concerns: New Patient - Invasive ductal carcinoma of breast, stage 1, right.       Geni Penaloza CMA                Again, thank you for allowing me to participate in the care of your patient.        Sincerely,        Cory Carrasco MD    "

## 2023-04-12 NOTE — PROGRESS NOTES
"Oncology Rooming Note    April 12, 2023 10:01 AM   Cordelia Thorne is a 77 year old female who presents for:    Chief Complaint   Patient presents with     Oncology Clinic Visit     Breast Cancer     Follow up     Initial Vitals: BP (!) 175/57 (BP Location: Left arm, Patient Position: Sitting)   Pulse 60   Temp 98.1  F (36.7  C)   Resp 20   Wt 74.4 kg (164 lb)   SpO2 98%   BMI 26.08 kg/m   Estimated body mass index is 26.08 kg/m  as calculated from the following:    Height as of 3/16/23: 1.689 m (5' 6.5\").    Weight as of this encounter: 74.4 kg (164 lb). Body surface area is 1.87 meters squared.  No Pain (0) Comment: Data Unavailable   No LMP recorded. Patient is postmenopausal.  Allergies reviewed: Yes  Medications reviewed: Yes    Medications: Medication refills not needed today.  Pharmacy name entered into Axxana:    [id number] [place], [place] - [address] AT [facility] OF [facility] [address]    Clinical concerns: follow up, possible SIM, pt very anxious, very concerned about infections, brought her own thermometer, wearing latex gloves and belongings in a bag. Dr. Jaimes was notified.      Mora David RN            "

## 2023-04-13 ENCOUNTER — TELEPHONE (OUTPATIENT)
Dept: ONCOLOGY | Facility: HOSPITAL | Age: 78
End: 2023-04-13
Payer: COMMERCIAL

## 2023-04-13 NOTE — CONFIDENTIAL NOTE
Patient calls in today and leaves a message on the nurse triage line stating that she saw Dr Carrasco yesterday in the clinic.  She would like to discuss further coordination of care around completing radiation, starting letrozole and when she sees Dr Carrasco on 5/19.  She would like a call back at [phone].  One of the question she had was whether or not she needed to start the letrozole immediately after the 5/19 appointment or if she could go out of town for Memorial Day and started on 3/30.  I will get this routed over to the RN care coordinator to discuss with Dr Carrasco and get back to the patient.    Xenia Sykes, RN

## 2023-04-14 ENCOUNTER — PATIENT OUTREACH (OUTPATIENT)
Dept: ONCOLOGY | Facility: HOSPITAL | Age: 78
End: 2023-04-14
Payer: COMMERCIAL

## 2023-04-14 NOTE — PROGRESS NOTES
[facility]: Cancer Care                                                                                      RN Cancer Care Coordinator called patient in response to her voice message left on triage line yesterday. She read through the handout Dr. Carrasco gave her about letrozole, and became anxious about all the potential severe side effects. She wants to know if it is best to take is immediately when she is scheduled to return to clinic on May 19, or if it would be ok to postpone starting until after the family memorial day weekend - on May 30.     Writer will ask Dr. Carrasco and call her back.    Talked with Dr. Carrasco and called pt back. Relayed that he said it is just fine for her to start the medication after the family weekend. She verbalized understanding and appreciation for the call back. We will see her back in our clinic after radiation therapy - next month.    Signature:  Shari Boudreaux RN

## 2023-04-19 ENCOUNTER — OFFICE VISIT (OUTPATIENT)
Dept: RADIATION ONCOLOGY | Facility: HOSPITAL | Age: 78
End: 2023-04-19
Attending: STUDENT IN AN ORGANIZED HEALTH CARE EDUCATION/TRAINING PROGRAM
Payer: COMMERCIAL

## 2023-04-19 DIAGNOSIS — C50.911 INVASIVE DUCTAL CARCINOMA OF BREAST, STAGE 1, RIGHT (H): Primary | ICD-10-CM

## 2023-04-19 PROCEDURE — 77290 THER RAD SIMULAJ FIELD CPLX: CPT | Mod: 26 | Performed by: STUDENT IN AN ORGANIZED HEALTH CARE EDUCATION/TRAINING PROGRAM

## 2023-04-19 PROCEDURE — G0463 HOSPITAL OUTPT CLINIC VISIT: HCPCS | Mod: 25 | Performed by: STUDENT IN AN ORGANIZED HEALTH CARE EDUCATION/TRAINING PROGRAM

## 2023-04-19 PROCEDURE — 99214 OFFICE O/P EST MOD 30 MIN: CPT | Mod: 25 | Performed by: STUDENT IN AN ORGANIZED HEALTH CARE EDUCATION/TRAINING PROGRAM

## 2023-04-19 PROCEDURE — 77290 THER RAD SIMULAJ FIELD CPLX: CPT | Performed by: STUDENT IN AN ORGANIZED HEALTH CARE EDUCATION/TRAINING PROGRAM

## 2023-04-19 PROCEDURE — 77334 RADIATION TREATMENT AID(S): CPT | Performed by: STUDENT IN AN ORGANIZED HEALTH CARE EDUCATION/TRAINING PROGRAM

## 2023-04-19 PROCEDURE — 77334 RADIATION TREATMENT AID(S): CPT | Mod: 26 | Performed by: STUDENT IN AN ORGANIZED HEALTH CARE EDUCATION/TRAINING PROGRAM

## 2023-04-19 NOTE — PROGRESS NOTES
"Oncology Rooming Note    April 19, 2023 2:14 PM   Cordelia Thorne is a 77 year old female who presents for:    Chief Complaint   Patient presents with     Oncology Clinic Visit     Breast Cancer     Follow up     Initial Vitals: There were no vitals taken for this visit. Estimated body mass index is 27.84 kg/m  as calculated from the following:    Height as of 4/12/23: 1.619 m (5' 3.75\").    Weight as of 4/12/23: 73 kg (160 lb 14.4 oz). There is no height or weight on file to calculate BSA.  No Pain (0) Comment: Data Unavailable   No LMP recorded. Patient is postmenopausal.  Allergies reviewed: Yes  Medications reviewed: Yes    Medications: Medication refills not needed today.  Pharm[facility] into CyberSettle: [id number] [address] AT [facility]    Clinical concerns: pt here to sign consent before ROXANNE Jaimes was notified.      Mora David RN            "

## 2023-04-19 NOTE — LETTER
"    4/19/2023         RE: Cordelia Thorne  [address] [address]  [facility] [address]        Dear Colleague,    Thank you for referring your patient, Cordelia Thorne, to the [facility] RADIATION ONCOLOGY [place]. Please see a copy of my visit note below.    Oncology Rooming Note    April 19, 2023 2:14 PM   Cordelia Thorne is a 77 year old female who presents for:    Chief Complaint   Patient presents with     Oncology Clinic Visit     Breast Cancer     Follow up     Initial Vitals: There were no vitals taken for this visit. Estimated body mass index is 27.84 kg/m  as calculated from the following:    Height as of 4/12/23: 1.619 m (5' 3.75\").    Weight as of 4/12/23: 73 kg (160 lb 14.4 oz). There is no height or weight on file to calculate BSA.  No Pain (0) Comment: Data Unavailable   No LMP recorded. Patient is postmenopausal.  Allergies reviewed: Yes  Medications reviewed: Yes    Medications: Medication refills not needed today.  Pharmacy name entered into [email or web address]: [id number] [place] [address] [address] AT [facility]    Clinical concerns: pt here to sign consent before SIM Dr. Jaimes was notified.      Mora David RN                [facility] Radiation Oncology Follow Up     Patient: Cordelia Thorne  MRN: [id number]  Date of Service: 04/19/2023       DIAGNOSIS: 77-year-old postmenopausal female with invasive ductal carcinoma of the right breast status postbiopsy   Cancer Staging   Invasive ductal carcinoma of breast, stage 1, right (H)  Staging form: Breast, AJCC 8th Edition  - Pathologic: Stage IA (pT2, pN0, cM0, G2, ER+, FL+, HER2-) - Signed by Cheyenne Jaimes MD on 4/12/2023      SUBJECTIVE:  Ms. Thorne is a 77 year old female who initially seen in consult 3/10/2023 please refer to that note for additional details.  Briefly,     2/8/2023 screening mammogram: Is a 1.5 cm spiculated mass within the right breast along the 12:00 radial.  On the left there is a loose " grouping of nonspecific calcifications within the upper outer quadrant 5 cm from the nipple.  Diagnostic mammogram and targeted ultrasound recommended.  BI-RADS 0.     2/13/2023 diagnostic mammogram  Persistence of irregular spiculated mass within the right breast on o'clock position mild recommend targeted right breast ultrasound.  Probably round and punctate calcifications in the segmental distribution within the left breast brown in from the 1:00 to 2:00 positions, anterior posterior depth measuring 2.9 x 6.9 x 6.1 cm in total dimension.    Ultrasound: Right breast at the 12 o'clock position, 4 cm from the nipple demonstrates a 1.1 x 0.9 x 1.6 cm hypoechoic mass with spiculated margins and associated architectural distortion.  Right axilla evaluation demonstrates a few nonenlarged and morphologically normal lymph nodes  BI-RADS 5.     2/22/2023 ultrasound-guided core needle biopsy right breast  Invasive ductal carcinoma, grade 2, ER positive (100%), TX positive (10 to 20%), HER2/davon equivocal by IHC, negative by FISH     2/22/2023 stereotactic vacuum-assisted left breast biopsy  Benign breast tissue focal columnar cell change and moderate usual ductal hyperplasia     Discussed lumpectomy with sentinel lymph node biopsy and potential adjuvant therapy.  Patient proceeded with breast conservation surgery.     3/16/2023 right breast lumpectomy sentinel lymph node biopsy, pathology ([id number]): Invasive ductal carcinoma, grade 2, 25 x 20 x 10 mm in size.  Margins negative (3 mm medial).  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  Margins negative (3 mm medial and lateral).  Right axillary lymph node benign (0/1).  Stage pT2, pN0.  Oncotype DX: 23.     She is recovered well since surgery aside for some bruising and swelling and returns to clinic today for follow-up for further consideration of adjuvant therapy.  She has multiple  questions regarding adjuvant radiation as well as adjuvant endocrine therapy.    Review of Systems:      General  Constitutional  Constitutional (WDL): All constitutional elements are within defined limits  EENT  Eye Disorders  Eye Disorder (WDL): Assessment not pertinent to visit  Ear Disorders  Ear Disorder (WDL): Assessment not pertinent to visit  Respiratory  Respiratory  Respiratory (WDL): All respiratory elements are within defined limits  Cardiovascular  Cardiovascular  Cardiovascular (WDL): All cardiovascular elements are within defined limits  Gastrointestinal  Gastrointestinal  Gastrointestinal (WDL): All gastrointestinal elements are within defined limits  Musculoskeletal  Musculoskeletal and Connective Tissue Disorders  Musculoskeletal & Connective (WDL): Exceptions to WDL  Arthralgia: Mild pain  Integumentary  Integumentary  Integumentary (WDL): All integumentary elements are within defined limits  Neurological  Neurosensory  Neurosensory (WDL): All neurosensory elements are within defined limits  Genitourinary/Reproductive  Genitourinary  Genitourinary (WDL): All genitourinary elements are within defined limits  Lymphatic  Lymph System Disorders  Lymph (WDL): All lymph elements are within defined limits  Pain  Pain Score: No Pain (0)  AUA Assessment                                                              Accompanied by  Accompanied By: self only    Objective:        PHYSICAL EXAMINATION:    There were no vitals taken for this visit.    Gen: Alert, in NAD pleasant interactive, well nourished  Eyes: EOMI, sclera anicteric  HENT     Head: NC/AT  Pulm: No wheezing, stridor or respiratory distress  Chest:  Well-healing lumpectomy incision with improved bruising  Musculoskeletal: Normal muscle bulk and tone  Skin: Normal tone and turgor, wearing surgical gloves  Neurologic: A/Ox3, speech fluent, no focal motor deficits, gait normal and unaided  Psychiatric: Appropriate mood and affect     Imaging:  Imaging results 6 weeks:Image Guided Breast Localization w Sent Node Inj Right    Result Date: 3/16/2023  INDICATION: Pre-operative localization of invasive ductal carcinoma at 12 o'clock, 4 cm from the nipple. PROCEDURE: Informed consent was obtained from the patient. The breast was cleansed with ChloraPrep. Lidocaine was used for local anesthesia. A -gauge needle was then advanced to the area of abnormality. A localization wire was then deployed. 531uCi Tc99m lymphoseek was injected subdermally along the upper outer aspect of the areolar margin. Post-procedure mammograms demonstrate the localization wire in appropriate position. The previously placed marker was visualized. The patient tolerated this well.    IMPRESSION: Sonographically guided  wire localization. A specimen was sent for radiography. Specimen radiograph demonstrates the area of abnormality and the localization wire to be included in the specimen.     MA Post Procedure Right    Result Date: 3/16/2023  INDICATION: Pre-operative localization of invasive ductal carcinoma at 12 o'clock, 4 cm from the nipple. PROCEDURE: Informed consent was obtained from the patient. The breast was cleansed with ChloraPrep. Lidocaine was used for local anesthesia. A -gauge needle was then advanced to the area of abnormality. A localization wire was then deployed. 531uCi Tc99m lymphoseek was injected subdermally along the upper outer aspect of the areolar margin. Post-procedure mammograms demonstrate the localization wire in appropriate position. The previously placed marker was visualized. The patient tolerated this well.    IMPRESSION: Sonographically guided  wire localization. A specimen was sent for radiography. Specimen radiograph demonstrates the area of abnormality and the localization wire to be included in the specimen.     MA Breast Specimen Right    Result Date: 3/16/2023  INDICATION: Pre-operative localization of invasive ductal carcinoma at 12 o'clock, 4 cm  from the nipple. PROCEDURE: Informed consent was obtained from the patient. The breast was cleansed with ChloraPrep. Lidocaine was used for local anesthesia. A -gauge needle was then advanced to the area of abnormality. A localization wire was then deployed. 531uCi Tc99m lymphoseek was injected subdermally along the upper outer aspect of the areolar margin. Post-procedure mammograms demonstrate the localization wire in appropriate position. The previously placed marker was visualized. The patient tolerated this well.    IMPRESSION: Sonographically guided  wire localization. A specimen was sent for radiography. Specimen radiograph demonstrates the area of abnormality and the localization wire to be included in the specimen.     Impression   77-year-old postmenopausal woman with invasive ductal carcinoma of the right breast, grade 2, ER/MO positive, HER2/davon negative status post lumpectomy and sentinel lymph node biopsy with negative margins.     Invasive ductal carcinoma of breast, stage 1, right (H) [C50.911]   Cancer Staging   Invasive ductal carcinoma of breast, stage 1, right (H)  Staging form: Breast, AJCC 8th Edition  - Pathologic: Stage IA (pT2, pN0, cM0, G2, ER+, MO+, HER2-) - Signed by Cheyenne Jaimes MD on 4/12/2023        Assessment & Plan:   Reviewed adjuvant therapy with patient.  She met with heme-onc to discuss adjuvant endocrine therapy.  She is concerned regarding some of the side effects of that.    Reviewed adjuvant radiation therapy for older women with early-stage ER/MO positive breast cancer status postlumpectomy and sentinel lymph node biopsy.   While radiation reduces risk of local recurrence she is at very low risk of recurrence is given her favorable biology and good surgery.  The indications for, process of, alternatives, potential side effects and complications of RT to the right breast were discussed.    She asked multiple questions which were answered to her satisfaction and she  verbalized understanding.    Ultimately while concerned about some of the potential side effects she wishes to proceed with adjuvant radiation therapy to reduce her risk of breast cancer recurrence.     She is very concerned regarding the long-term potential side effects of radiation therapy including very small long-term risk of secondary malignancy.       Today she signed consent with CT simulation  following.    Thank you for allowing me to participate in the care of this patient. Feel free to contact me with all questions or concerns.   Intent of Therapy: Curative  Patient on concurrent Herceptin No  Adjuvant hormonal therapy: TBD  Intended fractionation schedule: 2600 cGy in 5 fractions hypofractionation        Breast cancer risk factors:   No obstetric history on file.  LMP Dates from Last 4 Encounters:   No data found for LMP         Side effects that may occur during or within weeks after radiation therapy       Fatigue and general weakness    Darkening, irritation, itchiness, redness, dryness, erythema, peeling, scabbing, ulceration and contraction of the skin of the breast and chest    Swelling of the breast    Loss of armpit hair    Lung irritation    Decrease in appetite     Side effects that may occur months or years after radiation therapy       Development of another tumor or cancer    Thickening, telangiectasias (development of spider like blood vessels in the skin) and ulceration of the skin of the breast and chest    Firming, fibrosis (scar tissue), fat necrosis, and distortion of the breast    Poor healing after a trauma or surgery in the irradiated area    Nerve damage resulting in loss of arm strength and sensation    Coronary artery blockage causing angina pain or a heart attack    Lung inflammation of fibrosis causing cough, fever and shortness of breath    Fracture of the ribs    Swellingof an arm and hand     The risks, benefits and alternatives to radiation therapy were outlined with the  patient. All questions were answered and a consent was signed.     Total time of this visit, including time spent face-to-face with patient and or via video/audio, and also in preparing for today's visit for MDM and documentation. Medical decision-making included consideration and possible diagnoses, management options, complex record review, review of diagnostic tests and information, consideration and discussion of significant complications based on comorbidities, discussion with providers involved in the care of the patient.     Over 30 Minutes spent.        Cheyenne Jaimes MD  Department of Radiation Oncology   [facility]  Tel: [phone]  Page: [phone]    [facility]  [address] [place] [address]     [address]  [place] [address]    CC:  Patient Care Team:  Shahid Ruggiero MD as PCP - General (Family Medicine)  Cheyenne Jaimes MD as MD (Radiation Oncology)  Xenia Garcia DO as MD (Surgery)  Xenia Garcia DO as Assigned Surgical Provider  Cheyenne Jaimes MD as Assigned Cancer Care Provider  Cory Carrasco MD as MD (Medical Oncology)      Again, thank you for allowing me to participate in the care of your patient.        Sincerely,        Cheyenne Jaimes MD

## 2023-04-20 ENCOUNTER — TELEPHONE (OUTPATIENT)
Dept: RADIATION ONCOLOGY | Facility: HOSPITAL | Age: 78
End: 2023-04-20
Payer: COMMERCIAL

## 2023-04-20 ENCOUNTER — APPOINTMENT (OUTPATIENT)
Dept: RADIATION ONCOLOGY | Facility: HOSPITAL | Age: 78
End: 2023-04-20
Attending: STUDENT IN AN ORGANIZED HEALTH CARE EDUCATION/TRAINING PROGRAM
Payer: COMMERCIAL

## 2023-04-20 PROCEDURE — 77263 THER RADIOLOGY TX PLNG CPLX: CPT | Performed by: STUDENT IN AN ORGANIZED HEALTH CARE EDUCATION/TRAINING PROGRAM

## 2023-04-20 NOTE — PROGRESS NOTES
Area(s) simulated:  Right breast  For planning:  CT Scan  Custom devices to be used: Breast board, Vaculock  Patient position: Supine  Field arrangement: Obliques  Signed Informed Consent obtained.  Planned dose:   2600 cGy in 5 fractions whole breast hypofractionation       Comments:  DIAGNOSIS: 77-year-old postmenopausal woman with invasive ductal carcinoma of the right breast, grade 2, ER/SC positive, HER2/davon negative status post lumpectomy and sentinel lymph node biopsy with negative margins.     Simulation for an adjuvant course of radiotherapy was performed. The patient was  placed in the supine position on the CT simulator couch and positioned using a breast board.  Scar and borders wired.  Planning CT of the chest was done. The procedure was well tolerated.    CTV will include the whole breast.    Complete documentation for simulation, devices, treatment planning, calculations,  in ARIA

## 2023-04-20 NOTE — PROGRESS NOTES
[facility] Radiation Oncology Follow Up     Patient: Cordelia Thorne  MRN: [id number]  Date of Service: 04/19/2023       DIAGNOSIS: 77-year-old postmenopausal female with invasive ductal carcinoma of the right breast status postbiopsy   Cancer Staging   Invasive ductal carcinoma of breast, stage 1, right (H)  Staging form: Breast, AJCC 8th Edition  - Pathologic: Stage IA (pT2, pN0, cM0, G2, ER+, LA+, HER2-) - Signed by Cheyenne Jaimes MD on 4/12/2023      SUBJECTIVE:  Ms. Thorne is a 77 year old female who initially seen in consult 3/10/2023 please refer to that note for additional details.  Briefly,     2/8/2023 screening mammogram: Is a 1.5 cm spiculated mass within the right breast along the 12:00 radial.  On the left there is a loose grouping of nonspecific calcifications within the upper outer quadrant 5 cm from the nipple.  Diagnostic mammogram and targeted ultrasound recommended.  BI-RADS 0.     2/13/2023 diagnostic mammogram  Persistence of irregular spiculated mass within the right breast on o'clock position mild recommend targeted right breast ultrasound.  Probably round and punctate calcifications in the segmental distribution within the left breast brown in from the 1:00 to 2:00 positions, anterior posterior depth measuring 2.9 x 6.9 x 6.1 cm in total dimension.    Ultrasound: Right breast at the 12 o'clock position, 4 cm from the nipple demonstrates a 1.1 x 0.9 x 1.6 cm hypoechoic mass with spiculated margins and associated architectural distortion.  Right axilla evaluation demonstrates a few nonenlarged and morphologically normal lymph nodes  BI-RADS 5.     2/22/2023 ultrasound-guided core needle biopsy right breast  Invasive ductal carcinoma, grade 2, ER positive (100%), LA positive (10 to 20%), HER2/davon equivocal by IHC, negative by FISH     2/22/2023 stereotactic vacuum-assisted left breast biopsy  Benign breast tissue focal columnar cell change and moderate usual ductal  hyperplasia     Discussed lumpectomy with sentinel lymph node biopsy and potential adjuvant therapy.  Patient proceeded with breast conservation surgery.     3/16/2023 right breast lumpectomy sentinel lymph node biopsy, pathology ([id number]): Invasive ductal carcinoma, grade 2, 25 x 20 x 10 mm in size.  Margins negative (3 mm medial).  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  Margins negative (3 mm medial and lateral).  Right axillary lymph node benign (0/1).  Stage pT2, pN0.  Oncotype DX: 23.     She is recovered well since surgery aside for some bruising and swelling and returns to clinic today for follow-up for further consideration of adjuvant therapy.  She has multiple questions regarding adjuvant radiation as well as adjuvant endocrine therapy.    Review of Systems:      General  Constitutional  Constitutional (WDL): All constitutional elements are within defined limits  EENT  Eye Disorders  Eye Disorder (WDL): Assessment not pertinent to visit  Ear Disorders  Ear Disorder (WDL): Assessment not pertinent to visit  Respiratory  Respiratory  Respiratory (WDL): All respiratory elements are within defined limits  Cardiovascular  Cardiovascular  Cardiovascular (WDL): All cardiovascular elements are within defined limits  Gastrointestinal  Gastrointestinal  Gastrointestinal (WDL): All gastrointestinal elements are within defined limits  Musculoskeletal  Musculoskeletal and Connective Tissue Disorders  Musculoskeletal & Connective (WDL): Exceptions to WDL  Arthralgia: Mild pain  Integumentary  Integumentary  Integumentary (WDL): All integumentary elements are within defined limits  Neurological  Neurosensory  Neurosensory (WDL): All neurosensory elements are within defined limits  Genitourinary/Reproductive  Genitourinary  Genitourinary (WDL): All genitourinary elements are within defined limits  Lymphatic  Lymph System  Disorders  Lymph (WDL): All lymph elements are within defined limits  Pain  Pain Score: No Pain (0)  AUA Assessment                                                              Accompanied by  Accompanied By: self only    Objective:        PHYSICAL EXAMINATION:    There were no vitals taken for this visit.    Gen: Alert, in NAD pleasant interactive, well nourished  Eyes: EOMI, sclera anicteric  HENT     Head: NC/AT  Pulm: No wheezing, stridor or respiratory distress  Chest:  Well-healing lumpectomy incision with improved bruising  Musculoskeletal: Normal muscle bulk and tone  Skin: Normal tone and turgor, wearing surgical gloves  Neurologic: A/Ox3, speech fluent, no focal motor deficits, gait normal and unaided  Psychiatric: Appropriate mood and affect     Imaging: Imaging results 6 weeks:Image Guided Breast Localization w Sent Node Inj Right    Result Date: 3/16/2023  INDICATION: Pre-operative localization of invasive ductal carcinoma at 12 o'clock, 4 cm from the nipple. PROCEDURE: Informed consent was obtained from the patient. The breast was cleansed with ChloraPrep. Lidocaine was used for local anesthesia. A -gauge needle was then advanced to the area of abnormality. A localization wire was then deployed. 531uCi Tc99m lymphoseek was injected subdermally along the upper outer aspect of the areolar margin. Post-procedure mammograms demonstrate the localization wire in appropriate position. The previously placed marker was visualized. The patient tolerated this well.    IMPRESSION: Sonographically guided  wire localization. A specimen was sent for radiography. Specimen radiograph demonstrates the area of abnormality and the localization wire to be included in the specimen.     MA Post Procedure Right    Result Date: 3/16/2023  INDICATION: Pre-operative localization of invasive ductal carcinoma at 12 o'clock, 4 cm from the nipple. PROCEDURE: Informed consent was obtained from the patient. The breast was cleansed  with ChloraPrep. Lidocaine was used for local anesthesia. A -gauge needle was then advanced to the area of abnormality. A localization wire was then deployed. 531uCi Tc99m lymphoseek was injected subdermally along the upper outer aspect of the areolar margin. Post-procedure mammograms demonstrate the localization wire in appropriate position. The previously placed marker was visualized. The patient tolerated this well.    IMPRESSION: Sonographically guided  wire localization. A specimen was sent for radiography. Specimen radiograph demonstrates the area of abnormality and the localization wire to be included in the specimen.     MA Breast Specimen Right    Result Date: 3/16/2023  INDICATION: Pre-operative localization of invasive ductal carcinoma at 12 o'clock, 4 cm from the nipple. PROCEDURE: Informed consent was obtained from the patient. The breast was cleansed with ChloraPrep. Lidocaine was used for local anesthesia. A -gauge needle was then advanced to the area of abnormality. A localization wire was then deployed. 531uCi Tc99m lymphoseek was injected subdermally along the upper outer aspect of the areolar margin. Post-procedure mammograms demonstrate the localization wire in appropriate position. The previously placed marker was visualized. The patient tolerated this well.    IMPRESSION: Sonographically guided  wire localization. A specimen was sent for radiography. Specimen radiograph demonstrates the area of abnormality and the localization wire to be included in the specimen.     Impression   77-year-old postmenopausal woman with invasive ductal carcinoma of the right breast, grade 2, ER/VT positive, HER2/davon negative status post lumpectomy and sentinel lymph node biopsy with negative margins.     Invasive ductal carcinoma of breast, stage 1, right (H) [C50.911]   Cancer Staging   Invasive ductal carcinoma of breast, stage 1, right (H)  Staging form: Breast, AJCC 8th Edition  - Pathologic: Stage IA (pT2,  pN0, cM0, G2, ER+, ND+, HER2-) - Signed by Cheyenne Jaimes MD on 4/12/2023        Assessment & Plan:   Reviewed adjuvant therapy with patient.  She met with heme-onc to discuss adjuvant endocrine therapy.  She is concerned regarding some of the side effects of that.    Reviewed adjuvant radiation therapy for older women with early-stage ER/ND positive breast cancer status postlumpectomy and sentinel lymph node biopsy.   While radiation reduces risk of local recurrence she is at very low risk of recurrence is given her favorable biology and good surgery.  The indications for, process of, alternatives, potential side effects and complications of RT to the right breast were discussed.    She asked multiple questions which were answered to her satisfaction and she verbalized understanding.    Ultimately while concerned about some of the potential side effects she wishes to proceed with adjuvant radiation therapy to reduce her risk of breast cancer recurrence.     She is very concerned regarding the long-term potential side effects of radiation therapy including very small long-term risk of secondary malignancy.       Today she signed consent with CT simulation  following.    Thank you for allowing me to participate in the care of this patient. Feel free to contact me with all questions or concerns.   Intent of Therapy: Curative  Patient on concurrent Herceptin No  Adjuvant hormonal therapy: TBD  Intended fractionation schedule: 2600 cGy in 5 fractions hypofractionation        Breast cancer risk factors:   No obstetric history on file.  LMP Dates from Last 4 Encounters:   No data found for LMP         Side effects that may occur during or within weeks after radiation therapy       Fatigue and general weakness    Darkening, irritation, itchiness, redness, dryness, erythema, peeling, scabbing, ulceration and contraction of the skin of the breast and chest    Swelling of the breast    Loss of armpit hair    Lung  irritation    Decrease in appetite     Side effects that may occur months or years after radiation therapy       Development of another tumor or cancer    Thickening, telangiectasias (development of spider like blood vessels in the skin) and ulceration of the skin of the breast and chest    Firming, fibrosis (scar tissue), fat necrosis, and distortion of the breast    Poor healing after a trauma or surgery in the irradiated area    Nerve damage resulting in loss of arm strength and sensation    Coronary artery blockage causing angina pain or a heart attack    Lung inflammation of fibrosis causing cough, fever and shortness of breath    Fracture of the ribs    Swellingof an arm and hand     The risks, benefits and alternatives to radiation therapy were outlined with the patient. All questions were answered and a consent was signed.     Total time of this visit, including time spent face-to-face with patient and or via video/audio, and also in preparing for today's visit for MDM and documentation. Medical decision-making included consideration and possible diagnoses, management options, complex record review, review of diagnostic tests and information, consideration and discussion of significant complications based on comorbidities, discussion with providers involved in the care of the patient.     Over 30 Minutes spent.        Cheyenne Jaimes MD  Department of Radiation Oncology   [facility]  Tel: [phone]  Page: [phone]    [facility]  [address] [place] [address]     [address] [place] [address]    CC:  Patient Care Team:  Shahid Ruggiero MD as PCP - General (Family Medicine)  Cheyenne Jaimes MD as MD (Radiation Oncology)  Xenia Garcia DO as MD (Surgery)  Xenia Garcia DO as Assigned Surgical Provider  Cheyenne Jaimes MD as Assigned Cancer Care Provider  Cory Carrasco MD as MD (Medical Oncology)

## 2023-04-20 NOTE — TELEPHONE ENCOUNTER
Pt calling with several questions. Pt very anxious and worried about tiredness. Discussed what fatigue means. Planning a vacation around memorial day and very concerned about Letrozole and being done only 17 days from last treatment. Informed pt to relax and go enjoy family. She may need rests but should have no trouble being able to enjoy. Discussed anxiety and all her questions, pt appreciative of conversation.

## 2023-04-25 ENCOUNTER — APPOINTMENT (OUTPATIENT)
Dept: RADIATION ONCOLOGY | Facility: HOSPITAL | Age: 78
End: 2023-04-25
Attending: STUDENT IN AN ORGANIZED HEALTH CARE EDUCATION/TRAINING PROGRAM
Payer: COMMERCIAL

## 2023-04-25 PROCEDURE — 77300 RADIATION THERAPY DOSE PLAN: CPT | Performed by: STUDENT IN AN ORGANIZED HEALTH CARE EDUCATION/TRAINING PROGRAM

## 2023-04-25 PROCEDURE — 77334 RADIATION TREATMENT AID(S): CPT | Mod: 26 | Performed by: STUDENT IN AN ORGANIZED HEALTH CARE EDUCATION/TRAINING PROGRAM

## 2023-04-25 PROCEDURE — 77334 RADIATION TREATMENT AID(S): CPT | Performed by: STUDENT IN AN ORGANIZED HEALTH CARE EDUCATION/TRAINING PROGRAM

## 2023-04-25 PROCEDURE — 77295 3-D RADIOTHERAPY PLAN: CPT | Performed by: STUDENT IN AN ORGANIZED HEALTH CARE EDUCATION/TRAINING PROGRAM

## 2023-04-25 PROCEDURE — 77300 RADIATION THERAPY DOSE PLAN: CPT | Mod: 26 | Performed by: STUDENT IN AN ORGANIZED HEALTH CARE EDUCATION/TRAINING PROGRAM

## 2023-04-25 PROCEDURE — 77295 3-D RADIOTHERAPY PLAN: CPT | Mod: 26 | Performed by: STUDENT IN AN ORGANIZED HEALTH CARE EDUCATION/TRAINING PROGRAM

## 2023-05-02 ENCOUNTER — TELEPHONE (OUTPATIENT)
Dept: RADIATION ONCOLOGY | Facility: HOSPITAL | Age: 78
End: 2023-05-02
Payer: COMMERCIAL

## 2023-05-02 NOTE — TELEPHONE ENCOUNTER
Pt called with questions about a new study out of [facility] and [place] about chemo and radiation causing cancer. I told her we are giving her the standard treatment for her cancer and that Dr. Jaimes stays abreast of new and updated research and wouldn't recommend treatment if it wasn't necessary. She had questions about what soap to use, I told her a fragrance-free and dye-free soap or anything that says it's for sensitive skin. She asked about likelihood of blistering from radiation, I told her it was really unlikely, but we'd watch her skin closely throughout the treatment.

## 2023-05-03 ENCOUNTER — OFFICE VISIT (OUTPATIENT)
Dept: RADIATION ONCOLOGY | Facility: HOSPITAL | Age: 78
End: 2023-05-03
Attending: STUDENT IN AN ORGANIZED HEALTH CARE EDUCATION/TRAINING PROGRAM
Payer: COMMERCIAL

## 2023-05-03 VITALS
HEART RATE: 63 BPM | BODY MASS INDEX: 28.42 KG/M2 | WEIGHT: 164.3 LBS | SYSTOLIC BLOOD PRESSURE: 175 MMHG | DIASTOLIC BLOOD PRESSURE: 55 MMHG | RESPIRATION RATE: 18 BRPM

## 2023-05-03 DIAGNOSIS — C50.911 INVASIVE DUCTAL CARCINOMA OF BREAST, STAGE 1, RIGHT (H): Primary | ICD-10-CM

## 2023-05-03 PROCEDURE — 77412 RADIATION TX DELIVERY LVL 3: CPT | Performed by: STUDENT IN AN ORGANIZED HEALTH CARE EDUCATION/TRAINING PROGRAM

## 2023-05-03 PROCEDURE — 77280 THER RAD SIMULAJ FIELD SMPL: CPT | Mod: 26 | Performed by: STUDENT IN AN ORGANIZED HEALTH CARE EDUCATION/TRAINING PROGRAM

## 2023-05-03 PROCEDURE — 77280 THER RAD SIMULAJ FIELD SMPL: CPT | Performed by: STUDENT IN AN ORGANIZED HEALTH CARE EDUCATION/TRAINING PROGRAM

## 2023-05-03 ASSESSMENT — PAIN SCALES - GENERAL: PAINLEVEL: NO PAIN (0)

## 2023-05-03 NOTE — PROGRESS NOTES
RADIATION ONCOLOGY WEEKLY TREATMENT VISIT NOTE      Assessment / Impression     Invasive ductal carcinoma of breast, stage 1, right (H) [C50.911]     Cancer Staging   Invasive ductal carcinoma of breast, stage 1, right (H)  Staging form: Breast, AJCC 8th Edition  - Pathologic: Stage IA (pT2, pN0, cM0, G2, ER+, [place]+, HER2-) - Signed by Cheyenne Jaimes MD on 4/12/2023       Tolerating radiation therapy well.  All questions and concerns addressed.  First day went well    Plan:     Continue radiation treatment as prescribed.  Radiation:   Site: R breast  Stereotactic Radiosurgery: No  Concurrent Therapy: No  Today's Dose: 520  Total Dose for Breast: 2600  Today's Fraction/Total Fraction Breast: 1/5    Reviewed potential expected side effects as well as less common ones  Questions answered  Encouraged her to follow-up with heme-onc regarding her concerns regarding letrozole    Subjective:      HPI: Cordelia CABALLERO Parent is a 77 year old female with  Invasive ductal carcinoma of breast, stage 1, right (H) [C50.911]  She tolerated first day of radiation therapy well.  She continues to have subtle swelling of the breast and area of lumpectomy which is unchanged.  No acute side effects.  Has DEXA scan scheduled and concerns regarding letrozole.  The following portions of the patient's history were reviewed and updated as appropriate: allergies, current medications, past family history, past medical history, past social history, past surgical history and problem list.    Assessment                  Body Site:  Breast                           Site: R breast  Stereotactic Radiosurgery: No  Concurrent Therapy: No  Today's Dose: 520  Total Dose for Breast: 2600  Today's Fraction/Total Fraction Breast: 1/5  Drainage: 0: Absent                                     Sexuality Alteration                    Emotional Alteration       Comfort Alteration   KPS: 100 % Normal, no complaints  Fatigue (ONS scale): 0: No Fatigue    Nutrition Alteration   Anorexia: 0: None  Nausea: 0: None  Vomitin: None  Weight: 74.5 kg (164 lb 4.8 oz)  Skin Alteration   Skin Sensation: 0: No problem  Skin Reaction: 0: None (Radiaplex given with written/verbal instructions)  AUA Assessment                                           Accompanied by       Objective:     Exam:     Vitals:    23 1202   BP: (!) 175/55   Pulse: 63   Resp: 18   Weight: 74.5 kg (164 lb 4.8 oz)   PainSc: No Pain (0)       Wt Readings from Last 8 Encounters:   23 74.5 kg (164 lb 4.8 oz)   23 73 kg (160 lb 14.4 oz)   23 74.4 kg (164 lb)   23 73.5 kg (162 lb)   03/10/23 73.4 kg (161 lb 14.4 oz)   23 72.6 kg (160 lb)       General: Alert and oriented, in no acute distress  [place] has no Erythema.  No lymphedema breast or upper extremity.    Treatment Summary to Date    Aria chart and setup information reviewed    Cheyenne Jaimes MD

## 2023-05-03 NOTE — LETTER
5/3/2023         RE: Cordelia Thorne  [address] [address]  [facility] [address]        Dear Colleague,    Thank you for referring your patient, Cordelia Thorne, to the [facility] RADIATION ONCOLOGY [place]. Please see a copy of my visit note below.    RADIATION ONCOLOGY WEEKLY TREATMENT VISIT NOTE      Assessment / Impression     Invasive ductal carcinoma of breast, stage 1, right (H) [C50.911]     Cancer Staging   Invasive ductal carcinoma of breast, stage 1, right (H)  Staging form: Breast, AJCC 8th Edition  - Pathologic: Stage IA (pT2, pN0, cM0, G2, ER+, [place]+, HER2-) - Signed by Cheyenne Jaimes MD on 4/12/2023       Tolerating radiation therapy well.  All questions and concerns addressed.  First day went well    Plan:     Continue radiation treatment as prescribed.  Radiation:   Site: R breast  Stereotactic Radiosurgery: No  Concurrent Therapy: No  Today's Dose: 520  Total Dose for Breast: 2600  Today's Fraction/Total Fraction Breast: 1/5    Reviewed potential expected side effects as well as less common ones  Questions answered  Encouraged her to follow-up with heme-onc regarding her concerns regarding letrozole    Subjective:      HPI: Cordelia Thorne is a 77 year old female with  Invasive ductal carcinoma of breast, stage 1, right (H) [C50.911]  She tolerated first day of radiation therapy well.  She continues to have subtle swelling of the breast and area of lumpectomy which is unchanged.  No acute side effects.  Has DEXA scan scheduled and concerns regarding letrozole.  The following portions of the patient's history were reviewed and updated as appropriate: allergies, current medications, past family history, past medical history, past social history, past surgical history and problem list.    Assessment                  Body Site:  Breast                           Site: R breast  Stereotactic Radiosurgery: No  Concurrent Therapy: No  Today's Dose: 520  Total Dose for Breast: 2600  Today's Fraction/Total  Fraction Breast: /  Drainage: 0: Absent                                     Sexuality Alteration                    Emotional Alteration       Comfort Alteration   KPS: 100 % Normal, no complaints  Fatigue (ONS scale): 0: No Fatigue   Nutrition Alteration   Anorexia: 0: None  Nausea: 0: None  Vomitin: None  Weight: 74.5 kg (164 lb 4.8 oz)  Skin Alteration   Skin Sensation: 0: No problem  Skin Reaction: 0: None (Radiaplex given with written/verbal instructions)  AUA Assessment                                           Accompanied by       Objective:     Exam:     Vitals:    23 1202   BP: (!) 175/55   Pulse: 63   Resp: 18   Weight: 74.5 kg (164 lb 4.8 oz)   PainSc: No Pain (0)       Wt Readings from Last 8 Encounters:   23 74.5 kg (164 lb 4.8 oz)   23 73 kg (160 lb 14.4 oz)   23 74.4 kg (164 lb)   23 73.5 kg (162 lb)   03/10/23 73.4 kg (161 lb 14.4 oz)   23 72.6 kg (160 lb)       General: Alert and oriented, in no acute distress  [place] has no Erythema.  No lymphedema breast or upper extremity.    Treatment Summary to Date    Aria chart and setup information reviewed    Cheyenne Jaimes MD      Again, thank you for allowing me to participate in the care of your patient.        Sincerely,        Cheyenne Jaimes MD

## 2023-05-04 ENCOUNTER — APPOINTMENT (OUTPATIENT)
Dept: RADIATION ONCOLOGY | Facility: HOSPITAL | Age: 78
End: 2023-05-04
Attending: STUDENT IN AN ORGANIZED HEALTH CARE EDUCATION/TRAINING PROGRAM
Payer: COMMERCIAL

## 2023-05-04 PROCEDURE — 77412 RADIATION TX DELIVERY LVL 3: CPT | Performed by: RADIOLOGY

## 2023-05-04 PROCEDURE — 77387 GUIDANCE FOR RADJ TX DLVR: CPT | Mod: 26 | Performed by: RADIOLOGY

## 2023-05-04 PROCEDURE — 77387 GUIDANCE FOR RADJ TX DLVR: CPT | Performed by: RADIOLOGY

## 2023-05-05 ENCOUNTER — APPOINTMENT (OUTPATIENT)
Dept: RADIATION ONCOLOGY | Facility: HOSPITAL | Age: 78
End: 2023-05-05
Attending: STUDENT IN AN ORGANIZED HEALTH CARE EDUCATION/TRAINING PROGRAM
Payer: COMMERCIAL

## 2023-05-05 PROCEDURE — 77387 GUIDANCE FOR RADJ TX DLVR: CPT | Performed by: RADIOLOGY

## 2023-05-05 PROCEDURE — 77412 RADIATION TX DELIVERY LVL 3: CPT | Performed by: RADIOLOGY

## 2023-05-05 PROCEDURE — 77387 GUIDANCE FOR RADJ TX DLVR: CPT | Mod: 26 | Performed by: RADIOLOGY

## 2023-05-08 ENCOUNTER — APPOINTMENT (OUTPATIENT)
Dept: RADIATION ONCOLOGY | Facility: HOSPITAL | Age: 78
End: 2023-05-08
Attending: STUDENT IN AN ORGANIZED HEALTH CARE EDUCATION/TRAINING PROGRAM
Payer: COMMERCIAL

## 2023-05-08 PROCEDURE — 77387 GUIDANCE FOR RADJ TX DLVR: CPT | Performed by: RADIOLOGY

## 2023-05-08 PROCEDURE — 77387 GUIDANCE FOR RADJ TX DLVR: CPT | Mod: 26 | Performed by: RADIOLOGY

## 2023-05-08 PROCEDURE — 77412 RADIATION TX DELIVERY LVL 3: CPT | Performed by: RADIOLOGY

## 2023-05-09 ENCOUNTER — APPOINTMENT (OUTPATIENT)
Dept: RADIATION ONCOLOGY | Facility: HOSPITAL | Age: 78
End: 2023-05-09
Attending: STUDENT IN AN ORGANIZED HEALTH CARE EDUCATION/TRAINING PROGRAM
Payer: COMMERCIAL

## 2023-05-09 DIAGNOSIS — C50.911 INVASIVE DUCTAL CARCINOMA OF BREAST, STAGE 1, RIGHT (H): Primary | ICD-10-CM

## 2023-05-09 PROCEDURE — 77412 RADIATION TX DELIVERY LVL 3: CPT | Performed by: RADIOLOGY

## 2023-05-09 PROCEDURE — 77387 GUIDANCE FOR RADJ TX DLVR: CPT | Performed by: RADIOLOGY

## 2023-05-09 PROCEDURE — 77387 GUIDANCE FOR RADJ TX DLVR: CPT | Mod: 26 | Performed by: RADIOLOGY

## 2023-05-09 PROCEDURE — 77427 RADIATION TX MANAGEMENT X5: CPT | Performed by: STUDENT IN AN ORGANIZED HEALTH CARE EDUCATION/TRAINING PROGRAM

## 2023-05-09 PROCEDURE — 77336 RADIATION PHYSICS CONSULT: CPT | Performed by: STUDENT IN AN ORGANIZED HEALTH CARE EDUCATION/TRAINING PROGRAM

## 2023-05-09 NOTE — PROGRESS NOTES
Pt ambulatory to radiation clinic for last RT. Discharge instructions given verbally and in writing. Informed to call with any questions or concerns. Follow up scheduled for 5/24.

## 2023-05-09 NOTE — PROGRESS NOTES
.     Radiation Treatment Summary          Patient: Cordelia Thorne            MRN: [id number]           : 1945        Care Provider: Cheyenne Jaimes MD         Date of Service: May 9, 2023      HISTORY: Cordelia Thorne was treated with 3D conformal radiation therapy.    77-year-old postmenopausal woman with invasive ductal carcinoma of the right breast, grade 2, ER/RI positive, HER2/davon negative status post lumpectomy and sentinel lymph node biopsy with negative margins.    SITE TREATED: Right breast  TOTAL DOSE: 2600 cGy  NUMBER OF FRACTIONS: 5 fractions  DATES COMPLETED: 5/3/2023 - 2023  CONCURRENT CHEMOTHERAPY: No  ADJUVANT THERAPY:Yes    Cordelia tolerated the treatment without unexpected side effects.     PLAN: Discharge instructions were given and Cordelia knows to call if questions/issues arise. Cordelia will be seen in f/u in 2 weeks without a scan.       Signed by: Cheyenne Jaimes MD

## 2023-05-12 ENCOUNTER — PATIENT OUTREACH (OUTPATIENT)
Dept: ONCOLOGY | Facility: HOSPITAL | Age: 78
End: 2023-05-12
Payer: COMMERCIAL

## 2023-05-12 NOTE — PROGRESS NOTES
"[facility]: Cancer Care                                                                                      RN Cancer Care Coordinator received call from patient with lots of questions and anxiety about her planned therapy.   She states that when she left her consult with Dr. Carrasco he gave her a printed handout about Letrozole. She read the side effects and has many concerns about what she read, in particular about the possibility of anaphylactic shock, heart attack, and broken bones. She is concerned about these considering her advanced age of 77. She wants to know if they would ever admit her to the hospital to monitor her in case these severe side effects happen.     She repeats several times that Dr. Carrasco explained that cancer cells can be in her blood and this drug is to prevent it from growing anyplace else in her body. \"I was shocked to hear that!\"   She has questions about nutrition and supplements. She read avocados can contribute to breast cancer. She states that Dr. Jaimes told her to stop taking Vit E & D because they are in her multi-vitamin.   She wants to know if an Xray of her below the knee and ankle can be added on to the scan of her hips and pelvis because she has a bump near her shin since January.  She wants to know what kind of exams Dr. Carrasco will be doing in clinic - Breast? Pelvic? Rectum?  (Writer tells her - yes for breast exams. No for pelvic or rectal exams - we get scans for the bones of the pelvis, but do not do gynecologic exams.)    She has an appt with Dr. Carrasco on June 2. Writer assures her that she can ask all her questions about the drug on that day, and that she can still decide to not take it if she chooses not to. She says she always does what the doctor tells her to do, but she is very anxious about this.   Writer offered that I would reach out to pharmacy to see if someone might be able to answer some of her questions about likelihood of some of the severe " side effects. She can ask Dr. Carrasco what the percentage is of breast recurrence when people do not take the letrozole.     Writer spoke with Dr. Carrasco. He cannot order additional Xray for her lower leg, that would need to come from another provider. He will discuss her questions at their visit.     Writer will reach out to pharmacy to see of one of our oncology pharmacists might be able to call her to answer some questions. Writer will check back in with her next week, letting her know that I will not be in on Monday or Tuesday.    Pt reiterated many of her questions over and over. Writer encouraged her to use guided imagery, visualization and/or prayer to help her brain chemistry calm her worries. She states she prays a lot.       Signature:  Shari Boudreaux RN

## 2023-05-17 ENCOUNTER — TELEPHONE (OUTPATIENT)
Dept: ONCOLOGY | Facility: HOSPITAL | Age: 78
End: 2023-05-17
Payer: COMMERCIAL

## 2023-05-17 NOTE — ORAL ONC MGMT
Oral Chemotherapy Monitoring Program       Spoke with Marina about letrozole and provided her with a handout that will help highlight the most common side effects of letrozole.   [email or web address]_Patient_[email or web address]  Worked to help answer her questions and calm her concerns about what she was reading online.  Marina was thankful for the call and said she very much appreciated my time and patience.     [place] Myers, PharmD, [facility]  Clinical Oncology Pharmacist  May 17, 2023

## 2023-05-18 ENCOUNTER — TELEPHONE (OUTPATIENT)
Dept: ONCOLOGY | Facility: HOSPITAL | Age: 78
End: 2023-05-18
Payer: COMMERCIAL

## 2023-05-18 NOTE — TELEPHONE ENCOUNTER
Called Marina today per RN request. Voicemail left explaining role and reason for call. Call back number provided.    Kiersten Lehman, MS, RD, LD

## 2023-05-24 ENCOUNTER — OFFICE VISIT (OUTPATIENT)
Dept: RADIATION ONCOLOGY | Facility: HOSPITAL | Age: 78
End: 2023-05-24
Attending: STUDENT IN AN ORGANIZED HEALTH CARE EDUCATION/TRAINING PROGRAM
Payer: COMMERCIAL

## 2023-05-24 VITALS
DIASTOLIC BLOOD PRESSURE: 67 MMHG | TEMPERATURE: 98 F | HEART RATE: 59 BPM | WEIGHT: 162 LBS | BODY MASS INDEX: 28.03 KG/M2 | SYSTOLIC BLOOD PRESSURE: 146 MMHG | OXYGEN SATURATION: 98 % | RESPIRATION RATE: 18 BRPM

## 2023-05-24 DIAGNOSIS — C50.911 INVASIVE DUCTAL CARCINOMA OF BREAST, STAGE 1, RIGHT (H): Primary | ICD-10-CM

## 2023-05-24 PROCEDURE — G0463 HOSPITAL OUTPT CLINIC VISIT: HCPCS | Performed by: STUDENT IN AN ORGANIZED HEALTH CARE EDUCATION/TRAINING PROGRAM

## 2023-05-24 ASSESSMENT — PAIN SCALES - GENERAL: PAINLEVEL: NO PAIN (0)

## 2023-05-24 NOTE — PROGRESS NOTES
"Oncology Rooming Note    May 24, 2023 10:47 AM   Cordelia CABALLERO Parent is a 77 year old female who presents for:    Chief Complaint   Patient presents with     Oncology Clinic Visit     Follow up     Initial Vitals: BP (!) 146/67   Pulse 59   Temp 98  F (36.7  C) (Oral)   Resp 18   Wt 73.5 kg (162 lb)   SpO2 98%   BMI 28.03 kg/m   Estimated body mass index is 28.03 kg/m  as calculated from the following:    Height as of 4/12/23: 1.619 m (5' 3.75\").    Weight as of this encounter: 73.5 kg (162 lb). Body surface area is 1.82 meters squared.  No Pain (0) Comment: Data Unavailable   No LMP recorded. Patient is postmenopausal.  Allergies reviewed: Yes  Medications reviewed: Yes    Medications: No refill needed   Pharm[facility] into [id number] [address] AT [facility]    Clinical concerns: Radiation therapy follow up EE was notified.      ANNIE JAY RN            "

## 2023-05-24 NOTE — LETTER
"    5/24/2023         RE: Cordelia Thorne  [address] [address]  [facility] [address]        Dear Colleague,    Thank you for referring your patient, Cordelia Thorne, to the [facility] RADIATION ONCOLOGY [place]. Please see a copy of my visit note below.    Oncology Rooming Note    May 24, 2023 10:47 AM   Cordelia Thorne is a 77 year old female who presents for:    Chief Complaint   Patient presents with     Oncology Clinic Visit     Follow up     Initial Vitals: BP (!) 146/67   Pulse 59   Temp 98  F (36.7  C) (Oral)   Resp 18   Wt 73.5 kg (162 lb)   SpO2 98%   BMI 28.03 kg/m   Estimated body mass index is 28.03 kg/m  as calculated from the following:    Height as of 4/12/23: 1.619 m (5' 3.75\").    Weight as of this encounter: 73.5 kg (162 lb). Body surface area is 1.82 meters squared.  No Pain (0) Comment: Data Unavailable   No LMP recorded. Patient is postmenopausal.  Allergies reviewed: Yes  Medications reviewed: Yes    Medications: No refill needed   Pharmacy name entered into [employer]: [facility] #[id number] [facility] [address] AT [facility]    Clinical concerns: Radiation therapy follow up EE was notified.      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      TYPE OF RADIATION THERAPY ADMINISTERED:    SITE TREATED: Right breast  TOTAL DOSE: 2600 cGy  NUMBER OF FRACTIONS: 5 fractions  DATES COMPLETED: 5/3/2023 - 5/9/2023  CONCURRENT CHEMOTHERAPY: No  ADJUVANT THERAPY:Yes     INTERVAL SINCE COMPLETION OF RADIATION THERAPY: 2 weeks      SUBJECTIVE:  Ms. Thorne is a 77 year old female who 2/13/2023 diagnostic " mammogram  Persistence of irregular spiculated mass within the right breast on o'clock position mild recommend targeted right breast ultrasound.  Probably round and punctate calcifications in the segmental distribution within the left breast brown in from the 1:00 to 2:00 positions, anterior posterior depth measuring 2.9 x 6.9 x 6.1 cm in total dimension.    Ultrasound: Right breast at the 12 o'clock position, 4 cm from the nipple demonstrates a 1.1 x 0.9 x 1.6 cm hypoechoic mass with spiculated margins and associated architectural distortion.  Right axilla evaluation demonstrates a few nonenlarged and morphologically normal lymph nodes  BI-RADS 5.     2/22/2023 ultrasound-guided core needle biopsy right breast  Invasive ductal carcinoma, grade 2, ER positive (100%), WI positive (10 to 20%), HER2/davon equivocal by IHC, negative by FISH     2/22/2023 stereotactic vacuum-assisted left breast biopsy  Benign breast tissue focal columnar cell change and moderate usual ductal hyperplasia     2/22/2023 ultrasound-guided core needle biopsy right breast  Invasive ductal carcinoma, grade 2, ER positive (100%), WI positive (10 to 20%), HER2/davon equivocal by IHC, negative by FISH     2/22/2023 stereotactic vacuum-assisted left breast biopsy  Benign breast tissue focal columnar cell change and moderate usual ductal hyperplasia     Discussed lumpectomy with sentinel lymph node biopsy and potential adjuvant therapy.  Patient proceeded with breast conservation surgery.     3/16/2023 right breast lumpectomy sentinel lymph node biopsy, pathology ([id number]): Invasive ductal carcinoma, grade 2, 25 x 20 x 10 mm in size.  Margins negative (3 mm medial).  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  Margins negative (3 mm medial and lateral).  Right axillary lymph node benign (0/1).  Stage pT2, pN0.  Oncotype DX: 23.     Completed radiation therapy as  above and returns to clinic today for routine follow-up visit.  She is quite pleased at how minimal skin reaction was noted.  Minimal tenderness in the breast.  No significant fatigue.  No additional acute side effects noted.  She is still concerned about cause of her breast cancer.    She is also concerned regarding the side effects of adjuvant endocrine therapy but thinks she will likely pursue it to reduce risks of recurrence.  She is again reassured that her risk of recurrence is quite low given her early stage disease.    Medications were reviewed and are up to date on EPIC.    The following portions of the patient's history were reviewed and updated as appropriate: allergies, current medications, past family history, past medical history, past social history, past surgical history and problem list.    Review of Systems:      General  Constitutional  Constitutional (WDL): All constitutional elements are within defined limits  EENT  Eye Disorders  Eye Disorder (WDL): Assessment not pertinent to visit  Ear Disorders  Ear Disorder (WDL): Assessment not pertinent to visit  Respiratory  Respiratory  Respiratory (WDL): All respiratory elements are within defined limits  Cardiovascular  Cardiovascular  Cardiovascular (WDL): All cardiovascular elements are within defined limits  Gastrointestinal  Gastrointestinal  Gastrointestinal (WDL): All gastrointestinal elements are within defined limits  Musculoskeletal  Musculoskeletal and Connective Tissue Disorders  Musculoskeletal & Connective (WDL): Exceptions to WDL  Arthralgia: Mild pain  Integumentary  Integumentary  Integumentary (WDL): All integumentary elements are within defined limits  Neurological  Neurosensory  Neurosensory (WDL): All neurosensory elements are within defined limits  Genitourinary/Reproductive  Genitourinary  Genitourinary (WDL): All genitourinary elements are within defined limits  Lymphatic  Lymph System Disorders  Lymph (WDL): All lymph elements  are within defined limits  Pain  Pain Score: No Pain (0)  AUA Assessment                                                              Accompanied by  Accompanied By: self only    Objective:       PHYSICAL EXAMINATION:    BP (!) 146/67   Pulse 59   Temp 98  F (36.7  C) (Oral)   Resp 18   Wt 73.5 kg (162 lb)   SpO2 98%   BMI 28.03 kg/m      Gen: Alert, in NAD pleasant interactive, well nourished  Eyes: EOMI, sclera anicteric  HENT     Head: NC/AT  Pulm:  No wheezing, stridor or respiratory distress  Chest: Breast symmetry is stable post radiation.  She has faint RT dermatitis with subtle erythema.  Good ROM, no lymphedema or other acute or subacute sequelae of her radiation.  Musculoskeletal: Normal muscle bulk and tone  Skin: Normal tone and turgor, warm, dry, intact  Neurologic: A/Ox3, speech fluent, no focal motor deficits, gait normal and unaided  Psychiatric: Anxious about cancer and treatment related side effects     Imaging: Imaging results 6 weeks:No results found.     Impression   No diagnosis found.   Cancer Staging   Invasive ductal carcinoma of breast, stage 1, right (H)  Staging form: Breast, AJCC 8th Edition  - Pathologic: Stage IA (pT2, pN0, cM0, G2, ER+, AK+, HER2-) - Signed by Cheyenne Jaimes MD on 4/12/2023      Assessment & Plan:   1.Follow-up with medical oncology regarding adjuvant endocrine therapy    2.  Mammogram indicated-12-months (next in February 2024)    3.  Return to clinic in 6 months or sooner if needed    Total time of this visit, including time spent face-to-face with patient and or via video/audio, and also in preparing for today's visit for MDM and documentation. Medical decision-making included consideration and possible diagnoses, management options, complex record review, review of diagnostic tests and information, consideration and discussion of significant complications based on comorbidities, discussion with providers involved in the care of the patient.     60  Minutes spent.         Cheyenne Jaimes MD  Department of Radiation Oncology   [facility]  Tel: [phone]  Page: [phone]    [facility]  [address] [place] [address]     [facility]   [address] [place] [address]    CC:  Patient Care Team:  Shahid Ruggiero MD as PCP - General (Family Medicine)  Cheyenne Jaimes MD as MD (Radiation Oncology)  Xenia Garcia DO as MD (Surgery)  Xenia Garcia DO as Assigned Surgical Provider  Cory Carrasco MD as MD (Medical Oncology)  Cheyenne Jaimes MD as Assigned Cancer Care Provider  Shari Boudreaux, RN as Specialty Care Coordinator (Hematology & Oncology)      Again, thank you for allowing me to participate in the care of your patient.        Sincerely,        Cheyenne Jaimes MD

## 2023-05-24 NOTE — PROGRESS NOTES
[facility] Radiation Oncology Follow Up     Patient: Cordelia Thorne  MRN: [id number]  Date of Service: 05/24/2023       DISEASE TREATED:  77-year-old postmenopausal woman with Stage pT2, pN0 invasive ductal carcinoma of the right breast, grade 2, ER/FL positive, HER2/davon negative status post lumpectomy and sentinel lymph node biopsy with negative margins.Oncotype DX: 23.      TYPE OF RADIATION THERAPY ADMINISTERED:    SITE TREATED: Right breast  TOTAL DOSE: 2600 cGy  NUMBER OF FRACTIONS: 5 fractions  DATES COMPLETED: 5/3/2023 - 5/9/2023  CONCURRENT CHEMOTHERAPY: No  ADJUVANT THERAPY:Yes     INTERVAL SINCE COMPLETION OF RADIATION THERAPY: 2 weeks      SUBJECTIVE:  Ms. Thorne is a 77 year old female who 2/13/2023 diagnostic mammogram  Persistence of irregular spiculated mass within the right breast on o'clock position mild recommend targeted right breast ultrasound.  Probably round and punctate calcifications in the segmental distribution within the left breast brown in from the 1:00 to 2:00 positions, anterior posterior depth measuring 2.9 x 6.9 x 6.1 cm in total dimension.    Ultrasound: Right breast at the 12 o'clock position, 4 cm from the nipple demonstrates a 1.1 x 0.9 x 1.6 cm hypoechoic mass with spiculated margins and associated architectural distortion.  Right axilla evaluation demonstrates a few nonenlarged and morphologically normal lymph nodes  BI-RADS 5.     2/22/2023 ultrasound-guided core needle biopsy right breast  Invasive ductal carcinoma, grade 2, ER positive (100%), FL positive (10 to 20%), HER2/davon equivocal by IHC, negative by FISH     2/22/2023 stereotactic vacuum-assisted left breast biopsy  Benign breast tissue focal columnar cell change and moderate usual ductal hyperplasia     2/22/2023 ultrasound-guided core needle biopsy right breast  Invasive ductal carcinoma, grade 2, ER positive (100%), FL positive (10 to 20%), HER2/davon equivocal by IHC, negative by FISH     2/22/2023  stereotactic vacuum-assisted left breast biopsy  Benign breast tissue focal columnar cell change and moderate usual ductal hyperplasia     Discussed lumpectomy with sentinel lymph node biopsy and potential adjuvant therapy.  Patient proceeded with breast conservation surgery.     3/16/2023 right breast lumpectomy sentinel lymph node biopsy, pathology ([id number]): Invasive ductal carcinoma, grade 2, 25 x 20 x 10 mm in size.  Margins negative (3 mm medial).  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  Margins negative (3 mm medial and lateral).  Right axillary lymph node benign (0/1).  Stage pT2, pN0.  Oncotype DX: 23.     Completed radiation therapy as above and returns to clinic today for routine follow-up visit.  She is quite pleased at how minimal skin reaction was noted.  Minimal tenderness in the breast.  No significant fatigue.  No additional acute side effects noted.  She is still concerned about cause of her breast cancer.    She is also concerned regarding the side effects of adjuvant endocrine therapy but thinks she will likely pursue it to reduce risks of recurrence.  She is again reassured that her risk of recurrence is quite low given her early stage disease.    Medications were reviewed and are up to date on EPIC.    The following portions of the patient's history were reviewed and updated as appropriate: allergies, current medications, past family history, past medical history, past social history, past surgical history and problem list.    Review of Systems:      General  Constitutional  Constitutional (WDL): All constitutional elements are within defined limits  EENT  Eye Disorders  Eye Disorder (WDL): Assessment not pertinent to visit  Ear Disorders  Ear Disorder (WDL): Assessment not pertinent to visit  Respiratory  Respiratory  Respiratory (WDL): All respiratory elements are within defined  limits  Cardiovascular  Cardiovascular  Cardiovascular (WDL): All cardiovascular elements are within defined limits  Gastrointestinal  Gastrointestinal  Gastrointestinal (WDL): All gastrointestinal elements are within defined limits  Musculoskeletal  Musculoskeletal and Connective Tissue Disorders  Musculoskeletal & Connective (WDL): Exceptions to WDL  Arthralgia: Mild pain  Integumentary  Integumentary  Integumentary (WDL): All integumentary elements are within defined limits  Neurological  Neurosensory  Neurosensory (WDL): All neurosensory elements are within defined limits  Genitourinary/Reproductive  Genitourinary  Genitourinary (WDL): All genitourinary elements are within defined limits  Lymphatic  Lymph System Disorders  Lymph (WDL): All lymph elements are within defined limits  Pain  Pain Score: No Pain (0)  AUA Assessment                                                              Accompanied by  Accompanied By: self only    Objective:       PHYSICAL EXAMINATION:    BP (!) 146/67   Pulse 59   Temp 98  F (36.7  C) (Oral)   Resp 18   Wt 73.5 kg (162 lb)   SpO2 98%   BMI 28.03 kg/m      Gen: Alert, in NAD pleasant interactive, well nourished  Eyes: EOMI, sclera anicteric  HENT     Head: NC/AT  Pulm:  No wheezing, stridor or respiratory distress  Chest: Breast symmetry is stable post radiation.  She has faint RT dermatitis with subtle erythema.  Good ROM, no lymphedema or other acute or subacute sequelae of her radiation.  Musculoskeletal: Normal muscle bulk and tone  Skin: Normal tone and turgor, warm, dry, intact  Neurologic: A/Ox3, speech fluent, no focal motor deficits, gait normal and unaided  Psychiatric: Anxious about cancer and treatment related side effects     Imaging: Imaging results 6 weeks:No results found.     Impression   No diagnosis found.   Cancer Staging   Invasive ductal carcinoma of breast, stage 1, right (H)  Staging form: Breast, AJCC 8th Edition  - Pathologic: Stage IA (pT2, pN0,  cM0, G2, ER+, AZ+, HER2-) - Signed by Cheyenne Jaimes MD on 4/12/2023      Assessment & Plan:   1.Follow-up with medical oncology regarding adjuvant endocrine therapy    2.  Mammogram indicated-12-months (next in February 2024)    3.  Return to clinic in 6 months or sooner if needed    Total time of this visit, including time spent face-to-face with patient and or via video/audio, and also in preparing for today's visit for MDM and documentation. Medical decision-making included consideration and possible diagnoses, management options, complex record review, review of diagnostic tests and information, consideration and discussion of significant complications based on comorbidities, discussion with providers involved in the care of the patient.     60 Minutes spent.         Cheyenne Jaimes MD  Department of Radiation Oncology   [facility]  Tel: [phone]  Page: [phone]    [facility]  [address] [place] [address]     [address] [place] [address]    CC:  Patient Care Team:  Shahid Ruggiero MD as PCP - General (Family Medicine)  Cheyenne Jaimes MD as MD (Radiation Oncology)  Xenia Garcia DO as MD (Surgery)  Xenia Garcia DO as Assigned Surgical Provider  Cory Carrasco MD as MD (Medical Oncology)  Cheyenne Jaimes MD as Assigned Cancer Care Provider  Shari Boudreaux, RN as Specialty Care Coordinator (Hematology & Oncology)

## 2023-05-26 ENCOUNTER — TELEPHONE (OUTPATIENT)
Dept: ONCOLOGY | Facility: HOSPITAL | Age: 78
End: 2023-05-26
Payer: COMMERCIAL

## 2023-05-26 NOTE — TELEPHONE ENCOUNTER
Patient calls in today and leaves a message on the  line stating that she has a follow-up question for JOVANNA Mccarthy for the patient.  This is in regards to the bone density scan that she has scheduled next Tuesday, 5/30.  I will get this routed over to Shari.    Xenia Sykes RN

## 2023-05-30 ENCOUNTER — TELEPHONE (OUTPATIENT)
Dept: ONCOLOGY | Facility: HOSPITAL | Age: 78
End: 2023-05-30
Payer: COMMERCIAL

## 2023-05-30 ENCOUNTER — HOSPITAL ENCOUNTER (OUTPATIENT)
Dept: BONE DENSITY | Facility: HOSPITAL | Age: 78
Discharge: HOME | End: 2023-05-30
Attending: INTERNAL MEDICINE | Admitting: INTERNAL MEDICINE
Payer: COMMERCIAL

## 2023-05-30 ENCOUNTER — PATIENT OUTREACH (OUTPATIENT)
Dept: ONCOLOGY | Facility: HOSPITAL | Age: 78
End: 2023-05-30
Payer: COMMERCIAL

## 2023-05-30 DIAGNOSIS — M81.0 AGE-RELATED OSTEOPOROSIS WITHOUT CURRENT PATHOLOGICAL FRACTURE: ICD-10-CM

## 2023-05-30 PROCEDURE — 77080 DXA BONE DENSITY AXIAL: CPT

## 2023-05-30 NOTE — PROGRESS NOTES
"[facility]: Cancer Care                                                                                      RN Cancer Care Coordinator received call from patient on triage line. She was on writer's list to call next.    She has a question about whether her shinbone will be included in the Dexa scan she is going to today for bone density. Writer let her know that this scan only covers hips pelvis and spine, and is for a baseline bone density. It does not extend down her legs. An xray of her leg would need to be ordered by a different doctor, such as an orthopaedist, not Dr. Carrasco.    She describes the old injury she received some time ago that bruised her shin, which is still sometimes painful when she rubs it. Writer let her know that bruises like this will not show on an xray, and that she can use ice if it helps it feel better.     She comments on \"all the radiation\" she is getting from the mammograms, CT scans, the Dexa scan, and the radiation treatments she received. Writer tells her that we don't believe she is being exposed to a dangerous amount of radiation, and that these are necessary tests to complete her care for breast cancer. She replies that she \"isn't complaining, but just commenting about it\".     Writer acknowledges with her that there are a lot of appointments. Wishes for her that her Dexa scan goes smoothly for her today.  She states she will be in clinic on Friday.     Signature:  Shari Boudreaux RN  "

## 2023-05-30 NOTE — TELEPHONE ENCOUNTER
Received a call back from Marina. Called her back today. Reviewed nutrition recommendations for cancer health and prevention. Handouts and web links sent to Marina via email. Encouraged Marina to reach out in the future with any additional questions.    Kiersten Lehman, MS, RD, LD

## 2023-06-02 ENCOUNTER — ONCOLOGY VISIT (OUTPATIENT)
Dept: ONCOLOGY | Facility: HOSPITAL | Age: 78
End: 2023-06-02
Attending: INTERNAL MEDICINE
Payer: COMMERCIAL

## 2023-06-02 ENCOUNTER — PATIENT OUTREACH (OUTPATIENT)
Dept: ONCOLOGY | Facility: HOSPITAL | Age: 78
End: 2023-06-02
Payer: COMMERCIAL

## 2023-06-02 DIAGNOSIS — C50.911 INVASIVE DUCTAL CARCINOMA OF BREAST, STAGE 1, RIGHT (H): Primary | ICD-10-CM

## 2023-06-02 PROCEDURE — G0463 HOSPITAL OUTPT CLINIC VISIT: HCPCS | Performed by: INTERNAL MEDICINE

## 2023-06-02 PROCEDURE — 99215 OFFICE O/P EST HI 40 MIN: CPT | Performed by: INTERNAL MEDICINE

## 2023-06-02 RX ORDER — LETROZOLE 2.5 MG/1
2.5 TABLET, FILM COATED ORAL DAILY
Qty: 30 TABLET | Refills: 1 | Status: SHIPPED | OUTPATIENT
Start: 2023-06-02 | End: 2023-06-06

## 2023-06-02 RX ORDER — MULTIVIT-MIN/IRON/FOLIC ACID/K 18-600-40
2000 CAPSULE ORAL DAILY
COMMUNITY

## 2023-06-02 NOTE — PROGRESS NOTES
[facility] Hematology and Oncology Progress Note    Patient: Cordelia Thorne  MRN: [id number]  6/02/23        Reason for Visit    Chief Complaint   Patient presents with     Oncology Clinic Visit     1 month follow up with DEXA scan review related to Invasive ductal carcinoma of breast, stage 1, right          Problem List Items Addressed This Visit        Other    Invasive ductal carcinoma of breast, stage 1, right (H) - Primary         Assessment and Plan  Early right-sided hormone receptor positive and HR negative breast cancer, status postlumpectomy and adjuvant radiation  T2N0.  Pathologic prognostic stage Ia.  Oncotype DX 23.  She has finished radiation.  She is here to discuss endocrine therapy.  Previously I had discussed about AI versus tamoxifen.  Considering her menopausal status AI is generally preferable.  I again discussed potential side effects and complications associated with AI.  Recommend starting Femara 2.5 mg daily for total of 5 years.  She had a lot of questions regarding potential interactions and side effects.  I went through all her questions in detail today.  She wanted to start endocrine therapy as an inpatient.  I told her that this is unnecessary.  Her DEXA scan unfortunately shows significant osteoporosis.  I think she will need phosphonate therapy.  Reviewed different options.  Gave her information regarding this.  Asked to continue vitamin D and calcium for now.  If she declines bisphosphonate therapy then potentially could switch treatment to tamoxifen.  She will call us and let us know.  I will see her back in 3 months.    Complaining of chest wall pain on the left side.  She thinks she has metastatic disease to the bones.  On my examination it looks like she probably has costochondritis.  I reassured her.     Cancer Staging   Invasive ductal carcinoma of breast, stage 1, right (H)  Staging form: Breast, AJCC 8th Edition  - Pathologic: Stage IA (pT2, pN0, cM0, G2, ER+, [place]+,  HER2-) - Signed by Cheyenne Jaimes MD on 4/12/2023      ECOG Performance    1 - Can't do physically strenuous work, but fully ambyulatory and can do light sedentary work         Problem List    Patient Active Problem List   Diagnosis     Vertigo     Other specified phobia     Invasive ductal carcinoma of breast, stage 1, right (H)        Oncology history      Interval History   Cordelia CABALLERO Parent is a 77 year old        Review of Systems  A comprehensive review of systems was negative except for what is noted in the interval history    Current Outpatient Medications   Medication     Ascorbic Acid (VITAMIN C) 500 MG CAPS     Lactobacillus rhamnosus GG (CULTURELLE) 15 billion cell CpSP     MISC NATURAL PRODUCTS PO     Vitamin D, Cholecalciferol, 25 MCG (1000 UT) CAPS     Calcium Carbonate-Vitamin D (CALCIUM-VITAMIN D3) 600-3.125 MG-MCG TABS     letrozole (FEMARA) 2.5 MG tablet     vitamin E 400 units TABS     No current facility-administered medications for this visit.        Physical Exam    No flowsheet data found.    General: alert and cooperative  HEENT: Head: Normal, normocephalic, atraumatic.  Eye: Normal external eye, conjunctiva, lids cornea, VERONIKA.  Chest: Clear to auscultation bilaterally.  She does complain of chest wall pain but looks like this is probably due to costochondritis.  No obvious masses felt.  Breast: Ectomy scar of the right breast.  No new masses or lumps felt.  No bilateral axilla adenopathy.  CNS: Alert and oriented x3, neurologic exam grossly normal.    Lab Results    No results found for this or any previous visit (from the past 168 hour(s)).    Imaging    CT Chest w/o Contrast    Result Date: 6/22/2023  EXAM: CT CHEST W/O CONTRAST LOCATION: [facility] DATE: 6/22/2023 INDICATION:  Rib pain on left side COMPARISON: None. TECHNIQUE: CT chest without IV contrast. Multiplanar reformats were obtained. Dose reduction techniques were used. CONTRAST: None. FINDINGS: LUNGS  AND PLEURA: Apical pleural-parenchymal scarring is present. There are a few peripheral micronodules in the apices measuring less than 3 mm in size. 3 mm subpleural nodule along the right hemidiaphragm (series 3, image 180). Symmetric mosaicism of the lung parenchyma. Trachea and central airways are patent. No bronchial wall thickening or bronchiectasis. No pleural effusions. MEDIASTINUM: Mitral annular calcifications. Cardiac chambers are normal in size. No pericardial effusion. Normal caliber thoracic aorta. 2 vessel arch anatomy. Imaged thyroid gland is normal. No enlarged mediastinal or hilar lymph nodes. Esophagus is decompressed. CORONARY ARTERY CALCIFICATION: None. UPPER ABDOMEN: No actionable findings in the imaged upper abdomen. MUSCULOSKELETAL: Thoracic vertebra are maintained in height. Minimal degenerative osteophytes. No fractures or bone lesions. There is an old fracture deformity of the lateral left clavicle and mild osteoarthrosis of the left glenohumeral joint.     IMPRESSION: 1.  No acute lung, airway, or pleural inflammatory process. 2.  No acute fracture or focal finding that would help explain left chest pain. 3.  Few pulmonary nodules measuring 3 mm or smaller. Per 2017 Fleischner Society guidelines, no specific imaging follow-up is necessary.     DX Hip/Pelvis/Spine    Result Date: 5/30/2023  EXAM: DX HIP/PELVIS/SPINE LOCATION: [facility] DATE/TIME: 5/30/2023 1:40 PM CDT INDICATION:  Age-related osteoporosis without current pathological fracture. DEMOGRAPHICS: Age- 77 years. Gender- Female. Menopausal status- Postmenopausal. COMPARISON: None. TECHNIQUE: Dual-energy x-ray absorptiometry (DXA) performed with routine technique. Trabecular bone score (TBS) analysis performed. FINDINGS: DXA RESULTS -Lumbar Spine: L1-L4: BMD: 0.755 g/cm2. T-score: -3.5. Z-score: -1.8. -RIGHT Hip Total: BMD: 0.801 g/cm2. T-score: -1.6. Z-score: 0.1. -RIGHT Hip Femoral neck: BMD: 0.759 g/cm2.  T-score: -2.0. Z-score: 0.0. -LEFT Hip Total: BMD: 0.770 g/cm2. T-score: -1.9. Z-score: -0.1. -LEFT Hip Femoral neck: BMD: 0.768 g/cm2. T-score: -1.9. Z-score: 0.0. WHO T-SCORE CRITERIA -Normal: T score at or above -1 SD -Osteopenia: T score between -1 and -2.5 SD -Osteoporosis: T score at or below -2.5 SD The World Health Organization (WHO) criteria is applicable to perimenopausal females, postmenopausal females, and men aged 50 years or older. FRACTURE RISK -The FRAX risk calculator is not applicable due to osteoporosis. RECOMMENDATIONS The patient's BMD is consistent with osteoporosis, and he/she is at increased fracture risk. If not currently being treated for low BMD, this would merit treatment according to the Bone Health and Osteoporosis Foundation.     IMPRESSION: OSTEOPOROSIS. T score meets the WHO criteria for osteoporosis at one or more measured sites. The risk of osteoporotic fracture increases approximately two-fold for each standard deviation decrease in T-score.                     A total of 45 min were spent today on this visit which included face to face conversation with the patient, EMR review, counseling and co-ordination of care and medical documentation.      Signed by: Cory Carrasco MD

## 2023-06-02 NOTE — PROGRESS NOTES
[facility]: Cancer Care                                                                                      RN Cancer Care Coordinator accompanied Dr. Carrasco in room for pt's breast exam and met in person for the first time.     After exam, Dr. Carrasco went over instructions - that she should go ahead with the dental work she has planned. This is a 2 part bridge, and she should let us know when she is healed up from that to plan for start to Letrozole. ??Perhaps an infusion appt is needed - writer will check.     For today we will make her 3 mo follow up visit with him. Writer accompanied her out to scheduling desk.    Signature:  Shari Boudreaux RN

## 2023-06-02 NOTE — LETTER
6/2/2023         RE: Cordelia Thorne  [address] [id number]  [facility] [address]        Dear Colleague,    Thank you for referring your patient, Cordelia Thorne, to the [facility] [place]. Please see a copy of my visit note below.    [facility] Hematology and Oncology Progress Note    Patient: Cordelia Thorne  MRN: [id number]  6/02/23        Reason for Visit    Chief Complaint   Patient presents with     Oncology Clinic Visit     1 month follow up with DEXA scan review related to Invasive ductal carcinoma of breast, stage 1, right          Problem List Items Addressed This Visit        Other    Invasive ductal carcinoma of breast, stage 1, right (H) - Primary         Assessment and Plan  Early right-sided hormone receptor positive and HR negative breast cancer, status postlumpectomy and adjuvant radiation  T2N0.  Pathologic prognostic stage Ia.  Oncotype DX 23.  She has finished radiation.  She is here to discuss endocrine therapy.  Previously I had discussed about AI versus tamoxifen.  Considering her menopausal status AI is generally preferable.  I again discussed potential side effects and complications associated with AI.  Recommend starting Femara 2.5 mg daily for total of 5 years.  She had a lot of questions regarding potential interactions and side effects.  I went through all her questions in detail today.  She wanted to start endocrine therapy as an inpatient.  I told her that this is unnecessary.  Her DEXA scan unfortunately shows significant osteoporosis.  I think she will need phosphonate therapy.  Reviewed different options.  Gave her information regarding this.  Asked to continue vitamin D and calcium for now.  If she declines bisphosphonate therapy then potentially could switch treatment to tamoxifen.  She will call us and let us know.  I will see her back in 3 months.    Complaining of chest wall pain on the left side.  She thinks she has metastatic disease to the bones.  On my  examination it looks like she probably has costochondritis.  I reassured her.     Cancer Staging   Invasive ductal carcinoma of breast, stage 1, right (H)  Staging form: Breast, AJCC 8th Edition  - Pathologic: Stage IA (pT2, pN0, cM0, G2, ER+, MI+, HER2-) - Signed by Cheyenne Jaimes MD on 4/12/2023      ECOG Performance    1 - Can't do physically strenuous work, but fully ambyulatory and can do light sedentary work         Problem List    Patient Active Problem List   Diagnosis     Vertigo     Other specified phobia     Invasive ductal carcinoma of breast, stage 1, right (H)        Oncology history      Interval History   Cordelia CABALLERO Parent is a 77 year old        Review of Systems  A comprehensive review of systems was negative except for what is noted in the interval history    Current Outpatient Medications   Medication     Ascorbic Acid (VITAMIN C) 500 MG CAPS     Lactobacillus rhamnosus GG (CULTURELLE) 15 billion cell CpSP     MISC NATURAL PRODUCTS PO     Vitamin D, Cholecalciferol, 25 MCG (1000 UT) CAPS     Calcium Carbonate-Vitamin D (CALCIUM-VITAMIN D3) 600-3.125 MG-MCG TABS     letrozole (FEMARA) 2.5 MG tablet     vitamin E 400 units TABS     No current facility-administered medications for this visit.        Physical Exam    No flowsheet data found.    General: alert and cooperative  HEENT: Head: Normal, normocephalic, atraumatic.  Eye: Normal external eye, conjunctiva, lids cornea, VERONIKA.  Chest: Clear to auscultation bilaterally.  She does complain of chest wall pain but looks like this is probably due to costochondritis.  No obvious masses felt.  Breast: Ectomy scar of the right breast.  No new masses or lumps felt.  No bilateral axilla adenopathy.  CNS: Alert and oriented x3, neurologic exam grossly normal.    Lab Results    No results found for this or any previous visit (from the past 168 hour(s)).    Imaging    CT Chest w/o Contrast    Result Date: 6/22/2023  EXAM: CT CHEST W/O CONTRAST LOCATION: [facility] DATE: 6/22/2023 INDICATION:  Rib pain on left side COMPARISON: None. TECHNIQUE: CT chest without IV contrast. Multiplanar reformats were obtained. Dose reduction techniques were used. CONTRAST: None. FINDINGS: LUNGS AND PLEURA: Apical pleural-parenchymal scarring is present. There are a few peripheral micronodules in the apices measuring less than 3 mm in size. 3 mm subpleural nodule along the right hemidiaphragm (series 3, image 180). Symmetric mosaicism of the lung parenchyma. Trachea and central airways are patent. No bronchial wall thickening or bronchiectasis. No pleural effusions. MEDIASTINUM: Mitral annular calcifications. Cardiac chambers are normal in size. No pericardial effusion. Normal caliber thoracic aorta. 2 vessel arch anatomy. Imaged thyroid gland is normal. No enlarged mediastinal or hilar lymph nodes. Esophagus is decompressed. CORONARY ARTERY CALCIFICATION: None. UPPER ABDOMEN: No actionable findings in the imaged upper abdomen. MUSCULOSKELETAL: Thoracic vertebra are maintained in height. Minimal degenerative osteophytes. No fractures or bone lesions. There is an old fracture deformity of the lateral left clavicle and mild osteoarthrosis of the left glenohumeral joint.     IMPRESSION: 1.  No acute lung, airway, or pleural inflammatory process. 2.  No acute fracture or focal finding that would help explain left chest pain. 3.  Few pulmonary nodules measuring 3 mm or smaller. Per 2017 Fleischner Society guidelines, no specific imaging follow-up is necessary.     DX Hip/Pelvis/Spine    Result Date: 5/30/2023  EXAM: DX HIP/PELVIS/SPINE LOCATION: [facility] DATE/TIME: 5/30/2023 1:40 PM CDT INDICATION:  Age-related osteoporosis without current pathological fracture. DEMOGRAPHICS: Age- 77 years. Gender- Female. Menopausal status- Postmenopausal. COMPARISON: None. TECHNIQUE: Dual-energy x-ray absorptiometry (DXA) performed with routine technique.  Trabecular bone score (TBS) analysis performed. FINDINGS: DXA RESULTS -Lumbar Spine: L1-L4: BMD: 0.755 g/cm2. T-score: -3.5. Z-score: -1.8. -RIGHT Hip Total: BMD: 0.801 g/cm2. T-score: -1.6. Z-score: 0.1. -RIGHT Hip Femoral neck: BMD: 0.759 g/cm2. T-score: -2.0. Z-score: 0.0. -LEFT Hip Total: BMD: 0.770 g/cm2. T-score: -1.9. Z-score: -0.1. -LEFT Hip Femoral neck: BMD: 0.768 g/cm2. T-score: -1.9. Z-score: 0.0. WHO T-SCORE CRITERIA -Normal: T score at or above -1 SD -Osteopenia: T score between -1 and -2.5 SD -Osteoporosis: T score at or below -2.5 SD The World Health Organization (WHO) criteria is applicable to perimenopausal females, postmenopausal females, and men aged 50 years or older. FRACTURE RISK -The FRAX risk calculator is not applicable due to osteoporosis. RECOMMENDATIONS The patient's BMD is consistent with osteoporosis, and he/she is at increased fracture risk. If not currently being treated for low BMD, this would merit treatment according to the Bone Health and Osteoporosis Foundation.     IMPRESSION: OSTEOPOROSIS. T score meets the WHO criteria for osteoporosis at one or more measured sites. The risk of osteoporotic fracture increases approximately two-fold for each standard deviation decrease in T-score.                     A total of 45 min were spent today on this visit which included face to face conversation with the patient, EMR review, counseling and co-ordination of care and medical documentation.      Signed by: Cory Carrasco MD        Again, thank you for allowing me to participate in the care of your patient.        Sincerely,        Cory Carrasco MD

## 2023-06-05 ENCOUNTER — TELEPHONE (OUTPATIENT)
Dept: ONCOLOGY | Facility: HOSPITAL | Age: 78
End: 2023-06-05
Payer: COMMERCIAL

## 2023-06-05 NOTE — TELEPHONE ENCOUNTER
Patient calls in today with many questions regarding her letrozole and follow-up visit with Dr Carrasco.  She is aware that he is out of the office all of this week and that his office visit note from last week is not complete.  I do see notes in here that KAMALA Mccarthy care coordinator was in for some of the visit and met with the patient.  I did let the patient know that I will have Shari follow-up with her tomorrow to see if she might have any better answers than I did.  She verbalized understanding and was appreciative.    Xenia Sykes RN

## 2023-06-06 ENCOUNTER — TELEPHONE (OUTPATIENT)
Dept: ONCOLOGY | Facility: HOSPITAL | Age: 78
End: 2023-06-06
Payer: COMMERCIAL

## 2023-06-06 DIAGNOSIS — C50.911 INVASIVE DUCTAL CARCINOMA OF BREAST, STAGE 1, RIGHT (H): ICD-10-CM

## 2023-06-06 RX ORDER — LETROZOLE 2.5 MG/1
2.5 TABLET, FILM COATED ORAL DAILY
Qty: 90 TABLET | Refills: 1 | Status: SHIPPED | OUTPATIENT
Start: 2023-06-06 | End: 2024-01-15

## 2023-06-06 NOTE — TELEPHONE ENCOUNTER
Marina called with questions regarding letrozole and the % of recurrence with an without the AI. She repeated the percentages to me but really wanted to be sure that if she started it and then stopped would her risk of recurrence be greater than if she had not tried it at all. I told her I have never heard any quotes like that. She wanted me to ask a provider to be sure. I advised that I would. She also wanted more information on osteoporosis treatment, dee/vit d supplements and other SE of the letrozole. We spoke at length and she accepted the information but really wanted the information regarding risk of recurrence. It told her I would get back to her. She thanked me for my time. KAMALA Hathaway, OCN, CBCN    3:50- I phoned Marina back after speaking with CB's partner, GN regarding wether the estrogen level increases the risk of recurrence if you take it and stop it or not take it at all and he advised it would not increase the risk. We also talked about dee supp. Again and I researched elemental ca and what is the best form. She is looking for something that does not have fillers that could be harmful. I also recommended speaking with a pharmacist and get their advice for the treatment of osteoporosis. She thanked me for my time and patience. KAMALA Hathaway, OCN, CBCN

## 2023-06-16 ENCOUNTER — TELEPHONE (OUTPATIENT)
Dept: RADIATION ONCOLOGY | Facility: HOSPITAL | Age: 78
End: 2023-06-16
Payer: COMMERCIAL

## 2023-06-16 NOTE — TELEPHONE ENCOUNTER
Pt called yesterday reporting rib pain. It is on the contralateral side to RT. She had Dr. Carrasco examine that area too who didn't seem concerned, two weeks ago. Pt states she's still feeling that pain (about 5 weeks now).     Pt wants Dr. Jaimes's opinion, should she get a scan, come in for apt, see a different provider?     Note forwarded.

## 2023-06-20 ENCOUNTER — PATIENT OUTREACH (OUTPATIENT)
Dept: ONCOLOGY | Facility: HOSPITAL | Age: 78
End: 2023-06-20
Payer: COMMERCIAL

## 2023-06-20 NOTE — PROGRESS NOTES
"[facility]: Cancer Care                                                                                      RN Cancer Care Coordinator received call from patient.  She is quite anxious about rib pain she complained of at her appointment with Dr. Carrasco on June 2. At the visit, Dr. Carrasco told her he did not think this was related to cancer. Patient states that the pain is getting worse, and it only started on May 10. \"And I  Have never had a pain like this before.\" She is quite concerned it may be cancer, that she might have cancer spreading to her liver and wants to know if Dr. Carrasco can order a scan to see if there is cancer there.     She also spoke with KAMALA Weber in radiation onc last week about it. Note was routed to Dr. Jaimes.    Writer asks patient if she has spoken to her PCP about this, explaining that there may be reasons other than cancer for this pain. She has not.   Writer assures her I will bring this to Dr. Carrasco's attention, but that there is not always a scan that can show why any given pain is happening. Additionally, if this is not something related to cancer, it would not be Dr. Carrasco's place to order a scan - that would fall to her PCP. Encouraged her to call that office to begin working with them on workup for this. Explained that Dr. Carrasco is in clinic tomorrow, and it will be in the afternoon or Thursday before  can get back to her.     We discussed the timing of her dental work in relation to the infusion she will need scheduled. She has her first appt for the dental bridge tomorrow, and the 2nd appt is on 6/29. In addition she relays that in March she had a tooth extraction that the site has not healed fully yet. It bleeds whenever she gets near it brushing. She has been told that this type of site takes 6 months to heal. This would be September. She wonders if her infusions should be postponed because \"those drugs can dissolve your bones, can't they?\"   Writer tells " her that I don't know exactly what medications Dr Carrasco is going to prescribe. She wants to know what they are - when she can - so that she can ask her oral surgeon if she should wait until the healing is more complete.     Writer again, assures her that I will discuss this with Dr. Carrasco and get back to her.     Shari Boudreaux RN   Cancer Care Nurse Coordinator  [facility]  [phone]                Signature:  Shari Boudreaux RN

## 2023-06-21 ENCOUNTER — TELEPHONE (OUTPATIENT)
Dept: ONCOLOGY | Facility: HOSPITAL | Age: 78
End: 2023-06-21
Payer: COMMERCIAL

## 2023-06-21 NOTE — TELEPHONE ENCOUNTER
[facility]: Cancer Care                                                                                      RN Cancer Care Coordinator received phone call from patient.  She reports that she was able to get into her PCP today, and he will order a CT scan to rule out anything untoward going on where she has the pain in the rib area.     Writer had asked Dr. Carrasco about this, and he concurred with what writer had told pt yesterday - that he cannot order a scan when he does not have an indication of a cancer related cause.     Was able to let patient know that Dr. Carrasco said she can start the Zometa infusion after the tooth extraction site heals. She can check with her oral surgeon and let us know when he thinks it is appropriate to start.     She verbalized understanding and appreciation for the information.     Signature:  Shari Boudreaux RN

## 2023-06-22 ENCOUNTER — HOSPITAL ENCOUNTER (OUTPATIENT)
Dept: CT IMAGING | Facility: HOSPITAL | Age: 78
Discharge: HOME | End: 2023-06-22
Attending: FAMILY MEDICINE | Admitting: FAMILY MEDICINE
Payer: COMMERCIAL

## 2023-06-22 DIAGNOSIS — R07.81 RIB PAIN ON LEFT SIDE: ICD-10-CM

## 2023-06-22 PROCEDURE — 71250 CT THORAX DX C-: CPT

## 2023-06-28 ENCOUNTER — TELEPHONE (OUTPATIENT)
Dept: RADIATION ONCOLOGY | Facility: HOSPITAL | Age: 78
End: 2023-06-28
Payer: COMMERCIAL

## 2023-06-28 NOTE — TELEPHONE ENCOUNTER
Pt calling and concerned that nipple and areola color of right breast is different in color. Explained this is expected and may be on/off for the next year. Pt also concerned because she had a CT and worried of the exposure of the radiation. Explained she is worried about this rib pain. Informed pt that it is on the opposite side and continue to follow with primary care. Informed pt to relax and breath. Informed to call with any questions or concerns.

## 2023-06-30 ENCOUNTER — TELEPHONE (OUTPATIENT)
Dept: ONCOLOGY | Facility: HOSPITAL | Age: 78
End: 2023-06-30
Payer: COMMERCIAL

## 2023-06-30 NOTE — ORAL ONC MGMT
Marina called our oral chemotherapy line to speak with [place] regarding flaxseed oil while on letrozole. She is using a [employer] verified brand for her flaxseed oil. I reviewed the article she sent which had general recommendations but not detailed on literature specific to flaxseed. We reviewed the literature that I am aware of regarding AI use, breast cancer, and flaxseed. There is some mixed data out there on using flaxseed/flaxseed oil, usually small and short term, but no major substantial studies. So far research suggests it is unlikely to increase breast cancer risk. Discussed that there was one very small study that looked at AI and flaxseed in humans which did not find strong results of flaxseed having a negative effect on AI activity. It is likely reasonable to consider moderate amounts of flaxseed. Overall, she will have to decide what is most comfortable for her regarding continuing flaxseed oil with our current research available. Answered questions to my best ability, and there were no further questions.     Altagracia Spears, PharmD, BCOP  Oral Chemotherapy Pharmacist  [phone]

## 2023-07-19 ENCOUNTER — OFFICE VISIT (OUTPATIENT)
Dept: RADIATION ONCOLOGY | Facility: HOSPITAL | Age: 78
End: 2023-07-19
Attending: STUDENT IN AN ORGANIZED HEALTH CARE EDUCATION/TRAINING PROGRAM
Payer: COMMERCIAL

## 2023-07-19 ENCOUNTER — PATIENT OUTREACH (OUTPATIENT)
Dept: ONCOLOGY | Facility: HOSPITAL | Age: 78
End: 2023-07-19

## 2023-07-19 DIAGNOSIS — C50.911 INVASIVE DUCTAL CARCINOMA OF BREAST, STAGE 1, RIGHT (H): Primary | ICD-10-CM

## 2023-07-19 PROCEDURE — G0463 HOSPITAL OUTPT CLINIC VISIT: HCPCS | Performed by: STUDENT IN AN ORGANIZED HEALTH CARE EDUCATION/TRAINING PROGRAM

## 2023-07-19 NOTE — PROGRESS NOTES
"Oncology Rooming Note    July 19, 2023 12:01 PM   Cordelia Thorne is a 78 year old female who presents for:    Chief Complaint   Patient presents with     Oncology Clinic Visit     Follow up     Breast Cancer     Initial Vitals: There were no vitals taken for this visit. Estimated body mass index is 28.03 kg/m  as calculated from the following:    Height as of 4/12/23: 1.619 m (5' 3.75\").    Weight as of 5/24/23: 73.5 kg (162 lb). There is no height or weight on file to calculate BSA.  No Pain (0) Comment: back pain on/off   No LMP recorded. Patient is postmenopausal.  Allergies reviewed: Yes  Medications reviewed: Yes    Medications: Medication refills not needed today.  Pharmacy name entered into [employer]: [id number] [address] AT [facility]    Clinical concerns: Follow up, pt wanting a breast exam, concerned about Letrazole causing her back pain, very anxious, refusing vitals d/t nerviness EE was notified.      Mora David RN            "

## 2023-07-19 NOTE — PROGRESS NOTES
[facility]: Cancer Care                                                                                      RN Cancer Care Coordinator called patient and left message responding to her call a few days about.     Did not get a call back. Will try again tomorrow.    Signature:  Shari Boudreaux RN

## 2023-07-19 NOTE — LETTER
"    7/19/2023         RE: Cordelia Thorne  [address] [address]  [facility] [address]        Dear Colleague,    Thank you for referring your patient, Cordelia Thorne, to the [facility] RADIATION ONCOLOGY [place]. Please see a copy of my visit note below.    Oncology Rooming Note    July 19, 2023 12:01 PM   Cordelia Thorne is a 78 year old female who presents for:    Chief Complaint   Patient presents with     Oncology Clinic Visit     Follow up     Breast Cancer     Initial Vitals: There were no vitals taken for this visit. Estimated body mass index is 28.03 kg/m  as calculated from the following:    Height as of 4/12/23: 1.619 m (5' 3.75\").    Weight as of 5/24/23: 73.5 kg (162 lb). There is no height or weight on file to calculate BSA.  No Pain (0) Comment: back pain on/off   No LMP recorded. Patient is postmenopausal.  Allergies reviewed: Yes  Medications reviewed: Yes    Medications: Medication refills not needed today.  Pharmacy name entered into [employer]: [id number] [place] [address] [address] AT [facility]    Clinical concerns: Follow up, pt wanting a breast exam, concerned about Letrazole causing her back pain, very anxious, refusing vitals d/t nerviness EE was notified.      Mora David RN              [facility] Radiation Oncology Follow Up     Patient: Cordelia Thorne  MRN: [id number]  Date of Service: 07/19/2023       DISEASE TREATED:  77-year-old postmenopausal woman with Stage pT2, pN0 invasive ductal carcinoma of the right breast, grade 2, ER/MO positive, HER2/davon negative status post lumpectomy and sentinel lymph node biopsy with negative margins.Oncotype DX: 23.      TYPE OF RADIATION THERAPY ADMINISTERED:  SITE TREATED: Right breast  TOTAL DOSE: 2600 cGy  NUMBER OF FRACTIONS: 5 fractions  DATES COMPLETED: 5/3/2023 - 5/9/2023  CONCURRENT CHEMOTHERAPY: No  ADJUVANT THERAPY:Yes     INTERVAL SINCE COMPLETION OF RADIATION THERAPY: 2 months      SUBJECTIVE:  Ms. " Parent is a 78 year old female who had an abnormal screen mamogram.     2/13/2023 diagnostic mammogram  Persistence of irregular spiculated mass within the right breast on o'clock position mild recommend targeted right breast ultrasound.  Probably round and punctate calcifications in the segmental distribution within the left breast brown in from the 1:00 to 2:00 positions, anterior posterior depth measuring 2.9 x 6.9 x 6.1 cm in total dimension.    Ultrasound: Right breast at the 12 o'clock position, 4 cm from the nipple demonstrates a 1.1 x 0.9 x 1.6 cm hypoechoic mass with spiculated margins and associated architectural distortion.  Right axilla evaluation demonstrates a few nonenlarged and morphologically normal lymph nodes  BI-RADS 5.     2/22/2023 ultrasound-guided core needle biopsy right breast  Invasive ductal carcinoma, grade 2, ER positive (100%), AL positive (10 to 20%), HER2/davon equivocal by IHC, negative by FISH     2/22/2023 stereotactic vacuum-assisted left breast biopsy  Benign breast tissue focal columnar cell change and moderate usual ductal hyperplasia     2/22/2023 ultrasound-guided core needle biopsy right breast  Invasive ductal carcinoma, grade 2, ER positive (100%), AL positive (10 to 20%), HER2/davon equivocal by IHC, negative by FISH     2/22/2023 stereotactic vacuum-assisted left breast biopsy  Benign breast tissue focal columnar cell change and moderate usual ductal hyperplasia     Discussed lumpectomy with sentinel lymph node biopsy and potential adjuvant therapy.  Patient proceeded with breast conservation surgery.     3/16/2023 right breast lumpectomy sentinel lymph node biopsy, pathology ([id number]): Invasive ductal carcinoma, grade 2, 25 x 20 x 10 mm in size.  Margins negative (3 mm medial).  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  Margins negative (3 mm medial and lateral).  Right axillary lymph  node benign (0/1).  Stage pT2, pN0.  Oncotype DX: 23.     Completed radiation therapy as above and returns to clinic today for routine follow-up visit.  She is quite pleased at how minimal skin reaction was noted.  She is tolerating Letrazole- no side effects.  She has noted pain in the mid chest inferior to breast.  We thought it could just be musculoskeletal srain but it has persisted so she had a CT chest with no etiology identified.   She then slept proped up and the pain resolved.  She has not noted symptoms of reflux in the past and has not started a PPI but will talk with her PCP.      She is very concerned about her DEXA results, and that she has ospeoporosis.  She is taking VitD and Ca in diet and supplemented.  Bisphosphonate planned but has to be cleared by dental first.     Increased activity- Walking an hour per day.  With increase in activity and sleeping propped up she has noted soreness lower back - noticed after sitting too long, when getting into bed.  The pain moves different places (centered and sometimes off to the side of midline back), comes and goes.  She is worried about osteoporotic fracture and how to be active and not increase hier risk of injury/fracture.  Interested in working with PT for her back pain and for safe recommended movement.     She thinks she may have felt some tiny nodules in the breast.     Medications were reviewed and are up to date on EPIC.    The following portions of the patient's history were reviewed and updated as appropriate: allergies, current medications, past family history, past medical history, past social history, past surgical history and problem list.    Review of Systems:      General  Constitutional  Constitutional (WDL): All constitutional elements are within defined limits  EENT  Eye Disorders  Eye Disorder (WDL): Assessment not pertinent to visit  Ear Disorders  Ear Disorder (WDL): Assessment not pertinent to  visit  Respiratory  Respiratory  Respiratory (WDL): All respiratory elements are within defined limits  Cardiovascular  Cardiovascular  Cardiovascular (WDL): All cardiovascular elements are within defined limits  Gastrointestinal  Gastrointestinal  Gastrointestinal (WDL): All gastrointestinal elements are within defined limits  Musculoskeletal  Musculoskeletal and Connective Tissue Disorders  Musculoskeletal & Connective (WDL): Exceptions to WDL  Arthralgia: Mild pain  Integumentary  Integumentary  Integumentary (WDL): All integumentary elements are within defined limits  Neurological  Neurosensory  Neurosensory (WDL): All neurosensory elements are within defined limits  Genitourinary/Reproductive  Genitourinary  Genitourinary (WDL): All genitourinary elements are within defined limits  Lymphatic  Lymph System Disorders  Lymph (WDL): All lymph elements are within defined limits  Pain  Pain Score: No Pain (0) (back pain on/off)  AUA Assessment                                                              Accompanied by  Accompanied By: self only    Objective:        PHYSICAL EXAMINATION:    There were no vitals taken for this visit.    Gen: Alert, in NAD pleasant interactive, well nourished  Eyes:  EOMI, sclera anicteric  HENT     Head: NC/AT  Pulm: No wheezing, stridor or respiratory distress  Chest:  Breast symmetry is stable post radiation.  She has no RT dermatitis or skin changes.  Good ROM, no lymphedema or other acute or subacute sequelae of her radiation. Breast with some palpable scaring in under her incision. Palpable breast tissue without discrete masses.     Musculoskeletal: Normal muscle bulk and tone  Skin: Normal tone and turgor, warm, dry, intact  Neurologic: A/Ox3, CN II-XII intact, face symmetric, speech fluent, no focal motor deficits, gait normal and unaided  Psychiatric: Appropriate mood and affect         Imaging: Imaging results 6 weeks:CT Chest w/o Contrast    Result Date: 6/22/2023  EXAM: CT  CHEST W/O CONTRAST LOCATION: [facility] DATE: 6/22/2023 INDICATION:  Rib pain on left side COMPARISON: None. TECHNIQUE: CT chest without IV contrast. Multiplanar reformats were obtained. Dose reduction techniques were used. CONTRAST: None. FINDINGS: LUNGS AND PLEURA: Apical pleural-parenchymal scarring is present. There are a few peripheral micronodules in the apices measuring less than 3 mm in size. 3 mm subpleural nodule along the right hemidiaphragm (series 3, image 180). Symmetric mosaicism of the lung parenchyma. Trachea and central airways are patent. No bronchial wall thickening or bronchiectasis. No pleural effusions. MEDIASTINUM: Mitral annular calcifications. Cardiac chambers are normal in size. No pericardial effusion. Normal caliber thoracic aorta. 2 vessel arch anatomy. Imaged thyroid gland is normal. No enlarged mediastinal or hilar lymph nodes. Esophagus is decompressed. CORONARY ARTERY CALCIFICATION: None. UPPER ABDOMEN: No actionable findings in the imaged upper abdomen. MUSCULOSKELETAL: Thoracic vertebra are maintained in height. Minimal degenerative osteophytes. No fractures or bone lesions. There is an old fracture deformity of the lateral left clavicle and mild osteoarthrosis of the left glenohumeral joint.     IMPRESSION: 1.  No acute lung, airway, or pleural inflammatory process. 2.  No acute fracture or focal finding that would help explain left chest pain. 3.  Few pulmonary nodules measuring 3 mm or smaller. Per 2017 Fleischner Society guidelines, no specific imaging follow-up is necessary.        Impression   No diagnosis found.   Cancer Staging   Invasive ductal carcinoma of breast, stage 1, right (H)  Staging form: Breast, AJCC 8th Edition  - Pathologic: Stage IA (pT2, pN0, cM0, G2, ER+, CT+, HER2-) - Signed by Cheyenne Jaimes MD on 4/12/2023      Assessment & Plan:   1.Follow-up with medical oncology regarding adjuvant endocrine therapy     2.  Mammogram  indicated-12-months (next in February 2024)     3.  Return to clinic in 6 months or sooner if needed    4.  Follow-up PCP to discuss PPI for potential GERD     Total time of this visit, including time spent face-to-face with patient and or via video/audio, and also in preparing for today's visit for MDM and documentation. Medical decision-making included consideration and possible diagnoses, management options, complex record review, review of diagnostic tests and information, consideration and discussion of significant complications based on comorbidities, discussion with providers involved in the care of the patient.     >45 Minutes spent.              Cheyenne Jaimes MD  Department of Radiation Oncology   [facility]  Tel: [phone]  Page: [phone]    [facility]  [address] [place] [address]     [address]   [place] [address]    CC:  Patient Care Team:  Shahid Ruggiero MD as PCP - General (Family Medicine)  Cheyenne Jaimes MD as MD (Radiation Oncology)  Xenia Garcia DO as MD (Surgery)  Xenia Garcia DO as Assigned Surgical Provider  Cory Carrasco MD as MD (Medical Oncology)  Cheyenne Jaimes MD as Assigned Cancer Care Provider  Shari Boudreaux, RN as Specialty Care Coordinator (Hematology & Oncology)      Again, thank you for allowing me to participate in the care of your patient.        Sincerely,        Cheyenne Jaimes MD

## 2023-07-19 NOTE — PROGRESS NOTES
[facility] Radiation Oncology Follow Up     Patient: Cordelia Thorne  MRN: [id number]  Date of Service: 07/19/2023       DISEASE TREATED:  77-year-old postmenopausal woman with Stage pT2, pN0 invasive ductal carcinoma of the right breast, grade 2, ER/KS positive, HER2/davon negative status post lumpectomy and sentinel lymph node biopsy with negative margins.Oncotype DX: 23.      TYPE OF RADIATION THERAPY ADMINISTERED:  SITE TREATED: Right breast  TOTAL DOSE: 2600 cGy  NUMBER OF FRACTIONS: 5 fractions  DATES COMPLETED: 5/3/2023 - 5/9/2023  CONCURRENT CHEMOTHERAPY: No  ADJUVANT THERAPY:Yes     INTERVAL SINCE COMPLETION OF RADIATION THERAPY: 2 months      SUBJECTIVE:  Ms. Thorne is a 78 year old female who had an abnormal screen mamogram.     2/13/2023 diagnostic mammogram  Persistence of irregular spiculated mass within the right breast on o'clock position mild recommend targeted right breast ultrasound.  Probably round and punctate calcifications in the segmental distribution within the left breast brown in from the 1:00 to 2:00 positions, anterior posterior depth measuring 2.9 x 6.9 x 6.1 cm in total dimension.    Ultrasound: Right breast at the 12 o'clock position, 4 cm from the nipple demonstrates a 1.1 x 0.9 x 1.6 cm hypoechoic mass with spiculated margins and associated architectural distortion.  Right axilla evaluation demonstrates a few nonenlarged and morphologically normal lymph nodes  BI-RADS 5.     2/22/2023 ultrasound-guided core needle biopsy right breast  Invasive ductal carcinoma, grade 2, ER positive (100%), KS positive (10 to 20%), HER2/davon equivocal by IHC, negative by FISH     2/22/2023 stereotactic vacuum-assisted left breast biopsy  Benign breast tissue focal columnar cell change and moderate usual ductal hyperplasia     2/22/2023 ultrasound-guided core needle biopsy right breast  Invasive ductal carcinoma, grade 2, ER positive (100%), KS positive (10 to 20%), HER2/davon equivocal by IHC,  negative by FISH     2/22/2023 stereotactic vacuum-assisted left breast biopsy  Benign breast tissue focal columnar cell change and moderate usual ductal hyperplasia     Discussed lumpectomy with sentinel lymph node biopsy and potential adjuvant therapy.  Patient proceeded with breast conservation surgery.     3/16/2023 right breast lumpectomy sentinel lymph node biopsy, pathology ([id number]): Invasive ductal carcinoma, grade 2, 25 x 20 x 10 mm in size.  Margins negative (3 mm medial).  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  Margins negative (3 mm medial and lateral).  Right axillary lymph node benign (0/1).  Stage pT2, pN0.  Oncotype DX: 23.     Completed radiation therapy as above and returns to clinic today for routine follow-up visit.  She is quite pleased at how minimal skin reaction was noted.  She is tolerating Letrazole- no side effects.  She has noted pain in the mid chest inferior to breast.  We thought it could just be musculoskeletal srain but it has persisted so she had a CT chest with no etiology identified.   She then slept proped up and the pain resolved.  She has not noted symptoms of reflux in the past and has not started a PPI but will talk with her PCP.      She is very concerned about her DEXA results, and that she has ospeoporosis.  She is taking VitD and Ca in diet and supplemented.  Bisphosphonate planned but has to be cleared by dental first.     Increased activity- Walking an hour per day.  With increase in activity and sleeping propped up she has noted soreness lower back - noticed after sitting too long, when getting into bed.  The pain moves different places (centered and sometimes off to the side of midline back), comes and goes.  She is worried about osteoporotic fracture and how to be active and not increase hier risk of injury/fracture.  Interested in working with PT for her back pain and for  safe recommended movement.     She thinks she may have felt some tiny nodules in the breast.     Medications were reviewed and are up to date on EPIC.    The following portions of the patient's history were reviewed and updated as appropriate: allergies, current medications, past family history, past medical history, past social history, past surgical history and problem list.    Review of Systems:      General  Constitutional  Constitutional (WDL): All constitutional elements are within defined limits  EENT  Eye Disorders  Eye Disorder (WDL): Assessment not pertinent to visit  Ear Disorders  Ear Disorder (WDL): Assessment not pertinent to visit  Respiratory  Respiratory  Respiratory (WDL): All respiratory elements are within defined limits  Cardiovascular  Cardiovascular  Cardiovascular (WDL): All cardiovascular elements are within defined limits  Gastrointestinal  Gastrointestinal  Gastrointestinal (WDL): All gastrointestinal elements are within defined limits  Musculoskeletal  Musculoskeletal and Connective Tissue Disorders  Musculoskeletal & Connective (WDL): Exceptions to WDL  Arthralgia: Mild pain  Integumentary  Integumentary  Integumentary (WDL): All integumentary elements are within defined limits  Neurological  Neurosensory  Neurosensory (WDL): All neurosensory elements are within defined limits  Genitourinary/Reproductive  Genitourinary  Genitourinary (WDL): All genitourinary elements are within defined limits  Lymphatic  Lymph System Disorders  Lymph (WDL): All lymph elements are within defined limits  Pain  Pain Score: No Pain (0) (back pain on/off)  AUA Assessment                                                              Accompanied by  Accompanied By: self only    Objective:        PHYSICAL EXAMINATION:    There were no vitals taken for this visit.    Gen: Alert, in NAD pleasant interactive, well nourished  Eyes:  EOMI, sclera anicteric  HENT     Head: NC/AT  Pulm: No wheezing, stridor or  respiratory distress  Chest:  Breast symmetry is stable post radiation.  She has no RT dermatitis or skin changes.  Good ROM, no lymphedema or other acute or subacute sequelae of her radiation. Breast with some palpable scaring in under her incision. Palpable breast tissue without discrete masses.     Musculoskeletal: Normal muscle bulk and tone  Skin: Normal tone and turgor, warm, dry, intact  Neurologic: A/Ox3, CN II-XII intact, face symmetric, speech fluent, no focal motor deficits, gait normal and unaided  Psychiatric: Appropriate mood and affect         Imaging: Imaging results 6 weeks:CT Chest w/o Contrast    Result Date: 6/22/2023  EXAM: CT CHEST W/O CONTRAST LOCATION: [facility] DATE: 6/22/2023 INDICATION:  Rib pain on left side COMPARISON: None. TECHNIQUE: CT chest without IV contrast. Multiplanar reformats were obtained. Dose reduction techniques were used. CONTRAST: None. FINDINGS: LUNGS AND PLEURA: Apical pleural-parenchymal scarring is present. There are a few peripheral micronodules in the apices measuring less than 3 mm in size. 3 mm subpleural nodule along the right hemidiaphragm (series 3, image 180). Symmetric mosaicism of the lung parenchyma. Trachea and central airways are patent. No bronchial wall thickening or bronchiectasis. No pleural effusions. MEDIASTINUM: Mitral annular calcifications. Cardiac chambers are normal in size. No pericardial effusion. Normal caliber thoracic aorta. 2 vessel arch anatomy. Imaged thyroid gland is normal. No enlarged mediastinal or hilar lymph nodes. Esophagus is decompressed. CORONARY ARTERY CALCIFICATION: None. UPPER ABDOMEN: No actionable findings in the imaged upper abdomen. MUSCULOSKELETAL: Thoracic vertebra are maintained in height. Minimal degenerative osteophytes. No fractures or bone lesions. There is an old fracture deformity of the lateral left clavicle and mild osteoarthrosis of the left glenohumeral joint.     IMPRESSION: 1.   No acute lung, airway, or pleural inflammatory process. 2.  No acute fracture or focal finding that would help explain left chest pain. 3.  Few pulmonary nodules measuring 3 mm or smaller. Per 2017 Fleischner Society guidelines, no specific imaging follow-up is necessary.        Impression   No diagnosis found.   Cancer Staging   Invasive ductal carcinoma of breast, stage 1, right (H)  Staging form: Breast, AJCC 8th Edition  - Pathologic: Stage IA (pT2, pN0, cM0, G2, ER+, LA+, HER2-) - Signed by Cheyenne Jaimes MD on 4/12/2023      Assessment & Plan:   1.Follow-up with medical oncology regarding adjuvant endocrine therapy     2.  Mammogram indicated-12-months (next in February 2024)     3.  Return to clinic in 6 months or sooner if needed    4.  Follow-up PCP to discuss PPI for potential GERD     Total time of this visit, including time spent face-to-face with patient and or via video/audio, and also in preparing for today's visit for MDM and documentation. Medical decision-making included consideration and possible diagnoses, management options, complex record review, review of diagnostic tests and information, consideration and discussion of significant complications based on comorbidities, discussion with providers involved in the care of the patient.     >45 Minutes spent.              Cheyenne Jaimes MD  Department of Radiation Oncology   [facility]  Tel: [phone]  Page: [phone]    [facility]  [address] [place] [address]     [facility]   [address] [place] [address]    CC:  Patient Care Team:  Shahid Ruggiero MD as PCP - General (Family Medicine)  Cheyenne Jaimes MD as MD (Radiation Oncology)  Xenia Garcia DO as MD (Surgery)  Xenia Garcia DO as Assigned Surgical Provider  Cory Carrasco MD as MD (Medical Oncology)  Cheyenne Jaimes MD as Assigned Cancer Care Provider  Shari Boudreaux RN as Specialty Care Coordinator  (Hematology & Oncology)

## 2023-07-25 ENCOUNTER — TRANSFERRED RECORDS (OUTPATIENT)
Dept: HEALTH INFORMATION MANAGEMENT | Facility: CLINIC | Age: 78
End: 2023-07-25

## 2023-08-11 ENCOUNTER — TELEPHONE (OUTPATIENT)
Dept: ONCOLOGY | Facility: HOSPITAL | Age: 78
End: 2023-08-11
Payer: COMMERCIAL

## 2023-09-01 DIAGNOSIS — C50.911 INVASIVE DUCTAL CARCINOMA OF BREAST, STAGE 1, RIGHT (H): Primary | ICD-10-CM

## 2023-09-07 DIAGNOSIS — C50.911 INVASIVE DUCTAL CARCINOMA OF BREAST, STAGE 1, RIGHT (H): Primary | ICD-10-CM

## 2023-09-08 ENCOUNTER — ONCOLOGY VISIT (OUTPATIENT)
Dept: ONCOLOGY | Facility: HOSPITAL | Age: 78
End: 2023-09-08
Attending: INTERNAL MEDICINE
Payer: COMMERCIAL

## 2023-09-08 ENCOUNTER — LAB (OUTPATIENT)
Dept: INFUSION THERAPY | Facility: HOSPITAL | Age: 78
End: 2023-09-08
Attending: INTERNAL MEDICINE
Payer: COMMERCIAL

## 2023-09-08 VITALS
HEART RATE: 59 BPM | OXYGEN SATURATION: 96 % | WEIGHT: 156 LBS | BODY MASS INDEX: 26.63 KG/M2 | SYSTOLIC BLOOD PRESSURE: 129 MMHG | DIASTOLIC BLOOD PRESSURE: 58 MMHG | RESPIRATION RATE: 24 BRPM | HEIGHT: 64 IN

## 2023-09-08 DIAGNOSIS — C50.911 INVASIVE DUCTAL CARCINOMA OF BREAST, STAGE 1, RIGHT (H): Primary | ICD-10-CM

## 2023-09-08 DIAGNOSIS — C50.911 INVASIVE DUCTAL CARCINOMA OF BREAST, STAGE 1, RIGHT (H): ICD-10-CM

## 2023-09-08 DIAGNOSIS — M81.0 AGE-RELATED OSTEOPOROSIS WITHOUT CURRENT PATHOLOGICAL FRACTURE: ICD-10-CM

## 2023-09-08 LAB
ALBUMIN SERPL BCG-MCNC: 4.6 G/DL (ref 3.5–5.2)
ALP SERPL-CCNC: 86 U/L (ref 35–104)
ALT SERPL W P-5'-P-CCNC: 23 U/L (ref 0–50)
ANION GAP SERPL CALCULATED.3IONS-SCNC: 10 MMOL/L (ref 7–15)
AST SERPL W P-5'-P-CCNC: 22 U/L (ref 0–45)
BASOPHILS # BLD AUTO: 0.1 10E3/UL (ref 0–0.2)
BASOPHILS NFR BLD AUTO: 1 %
BILIRUB SERPL-MCNC: 0.4 MG/DL
BUN SERPL-MCNC: 21.5 MG/DL (ref 8–23)
CALCIUM SERPL-MCNC: 9.9 MG/DL (ref 8.8–10.2)
CHLORIDE SERPL-SCNC: 103 MMOL/L (ref 98–107)
CREAT SERPL-MCNC: 0.83 MG/DL (ref 0.51–0.95)
DEPRECATED HCO3 PLAS-SCNC: 28 MMOL/L (ref 22–29)
EGFRCR SERPLBLD CKD-EPI 2021: 73 ML/MIN/1.73M2
EOSINOPHIL # BLD AUTO: 0.3 10E3/UL (ref 0–0.7)
EOSINOPHIL NFR BLD AUTO: 4 %
ERYTHROCYTE [DISTWIDTH] IN BLOOD BY AUTOMATED COUNT: 13.3 % (ref 10–15)
GLUCOSE SERPL-MCNC: 109 MG/DL (ref 70–99)
HCT VFR BLD AUTO: 45.9 % (ref 35–47)
HGB BLD-MCNC: 14.7 G/DL (ref 11.7–15.7)
IMM GRANULOCYTES # BLD: 0 10E3/UL
IMM GRANULOCYTES NFR BLD: 0 %
LYMPHOCYTES # BLD AUTO: 1.7 10E3/UL (ref 0.8–5.3)
LYMPHOCYTES NFR BLD AUTO: 28 %
MCH RBC QN AUTO: 27.9 PG (ref 26.5–33)
MCHC RBC AUTO-ENTMCNC: 32 G/DL (ref 31.5–36.5)
MCV RBC AUTO: 87 FL (ref 78–100)
MONOCYTES # BLD AUTO: 0.5 10E3/UL (ref 0–1.3)
MONOCYTES NFR BLD AUTO: 9 %
NEUTROPHILS # BLD AUTO: 3.4 10E3/UL (ref 1.6–8.3)
NEUTROPHILS NFR BLD AUTO: 58 %
NRBC # BLD AUTO: 0 10E3/UL
NRBC BLD AUTO-RTO: 0 /100
PLATELET # BLD AUTO: 235 10E3/UL (ref 150–450)
POTASSIUM SERPL-SCNC: 4.7 MMOL/L (ref 3.4–5.3)
PROT SERPL-MCNC: 7.3 G/DL (ref 6.4–8.3)
RBC # BLD AUTO: 5.27 10E6/UL (ref 3.8–5.2)
SODIUM SERPL-SCNC: 141 MMOL/L (ref 136–145)
WBC # BLD AUTO: 5.9 10E3/UL (ref 4–11)

## 2023-09-08 PROCEDURE — 99214 OFFICE O/P EST MOD 30 MIN: CPT | Performed by: INTERNAL MEDICINE

## 2023-09-08 PROCEDURE — 85025 COMPLETE CBC W/AUTO DIFF WBC: CPT

## 2023-09-08 PROCEDURE — 36415 COLL VENOUS BLD VENIPUNCTURE: CPT

## 2023-09-08 PROCEDURE — 80053 COMPREHEN METABOLIC PANEL: CPT

## 2023-09-08 PROCEDURE — G0463 HOSPITAL OUTPT CLINIC VISIT: HCPCS | Performed by: INTERNAL MEDICINE

## 2023-09-08 RX ORDER — AMOXICILLIN 500 MG
1200 CAPSULE ORAL DAILY
COMMUNITY
End: 2024-02-12

## 2023-09-08 ASSESSMENT — PAIN SCALES - GENERAL: PAINLEVEL: NO PAIN (0)

## 2023-09-08 NOTE — Clinical Note
"    9/8/2023         RE: Cordelia Thorne  [address] [address]  [facility] [address]        Dear Colleague,    Thank you for referring your patient, Cordelia Thorne, to the [facility] [place]. Please see a copy of my visit note below.    Oncology Rooming Note    September 8, 2023 12:22 PM   Cordelia Thorne is a 78 year old female who presents for:    Chief Complaint   Patient presents with    Oncology Clinic Visit     3 month return Invasive ductal carcinoma of breast, stage 1, right (H), review labs      Initial Vitals: /58 (BP Location: Left arm, Patient Position: Sitting, Cuff Size: Adult Regular)   Pulse 59   Resp 24   Ht 1.626 m (5' 4\")   Wt 70.8 kg (156 lb)   SpO2 96%   BMI 26.78 kg/m   Estimated body mass index is 26.78 kg/m  as calculated from the following:    Height as of this encounter: 1.626 m (5' 4\").    Weight as of this encounter: 70.8 kg (156 lb). Body surface area is 1.79 meters squared.  No Pain (0) Comment: Data Unavailable   No LMP recorded. Patient is postmenopausal.  Allergies reviewed: Yes  Medications reviewed: Yes    Medications: Medication refills not needed today.  Pharmacy name entered into [employer]: [id number] [address] AT [facility] OF [facility]    Clinical concerns:  3 month return Invasive ductal carcinoma of breast, stage 1, right (H), review labs       Geni Penaloza CMA              [facility] Hematology and Oncology Progress Note    Patient: Cordelia Thorne  MRN: [id number]  9/08/23        Reason for Visit    Chief Complaint   Patient presents with     Oncology Clinic Visit     3 month return Invasive ductal carcinoma of breast, stage 1, right (H), review labs          Problem List Items Addressed This Visit    None          Assessment and Plan  Early right-sided hormone receptor positive and HR negative breast cancer, status postlumpectomy and adjuvant radiation  T2N0.  Pathologic prognostic stage Ia.  " Oncotype DX 23.  She has finished radiation.  She is here to discuss endocrine therapy.  Previously I had discussed about AI versus tamoxifen.  Considering her menopausal status AI is generally preferable.  I again discussed potential side effects and complications associated with AI.  Recommend starting Femara 2.5 mg daily for total of 5 years.  She had a lot of questions regarding potential interactions and side effects.  I went through all her questions in detail today.  She wanted to start endocrine therapy as an inpatient.  I told her that this is unnecessary.  Her DEXA scan unfortunately shows significant osteoporosis.  I think she will need phosphonate therapy.  Reviewed different options.  Gave her information regarding this.  Asked to continue vitamin D and calcium for now.  If she declines bisphosphonate therapy then potentially could switch treatment to tamoxifen.  She will call us and let us know.  I will see her back in 3 months.    Complaining of chest wall pain on the left side.  She thinks she has metastatic disease to the bones.  On my examination it looks like she probably has costochondritis.  I reassured her.     Cancer Staging   Invasive ductal carcinoma of breast, stage 1, right (H)  Staging form: Breast, AJCC 8th Edition  - Pathologic: Stage IA (pT2, pN0, cM0, G2, ER+, KS+, HER2-) - Signed by Cheyenne Jaimes MD on 4/12/2023      ECOG Performance    1 - Can't do physically strenuous work, but fully ambyulatory and can do light sedentary work         Problem List    Patient Active Problem List   Diagnosis     Vertigo     Other specified phobia     Invasive ductal carcinoma of breast, stage 1, right (H)        Oncology history      Interval History   Cordelia CABALLERO Parent is a 77 year old        Review of Systems  A comprehensive review of systems was negative except for what is noted in the interval history    Current Outpatient Medications   Medication     CALCIUM CITRATE PO     Lactobacillus  rhamnosus GG (CULTURELLE) 15 billion cell CpSP     letrozole (FEMARA) 2.5 MG tablet     Omega-3 Fatty Acids (FISH OIL) 1200 MG capsule     Vitamin D (Cholecalciferol) 50 MCG (2000 UT) CAPS     Ascorbic Acid (VITAMIN C) 500 MG CAPS     Calcium Carbonate-Vitamin D (CALCIUM-VITAMIN D3) 600-3.125 MG-MCG TABS     MISC NATURAL PRODUCTS PO     vitamin E 400 units TABS     No current facility-administered medications for this visit.        Physical Exam        General: alert and cooperative  HEENT: Head: Normal, normocephalic, atraumatic.  Eye: Normal external eye, conjunctiva, lids cornea, VERONIKA.  Chest: Clear to auscultation bilaterally.  She does complain of chest wall pain but looks like this is probably due to costochondritis.  No obvious masses felt.  Breast: Ectomy scar of the right breast.  No new masses or lumps felt.  No bilateral axilla adenopathy.  CNS: Alert and oriented x3, neurologic exam grossly normal.    Lab Results    Recent Results (from the past 168 hour(s))   Comprehensive metabolic panel (BMP + Alb, Alk Phos, ALT, AST, Total. Bili, TP)   Result Value Ref Range    Sodium 141 136 - 145 mmol/L    Potassium 4.7 3.4 - 5.3 mmol/L    Chloride 103 98 - 107 mmol/L    Carbon Dioxide (CO2) 28 22 - 29 mmol/L    Anion Gap 10 7 - 15 mmol/L    Urea Nitrogen 21.5 8.0 - 23.0 mg/dL    Creatinine 0.83 0.51 - 0.95 mg/dL    Calcium 9.9 8.8 - 10.2 mg/dL    Glucose 109 (H) 70 - 99 mg/dL    Alkaline Phosphatase 86 35 - 104 U/L    AST 22 0 - 45 U/L    ALT 23 0 - 50 U/L    Protein Total 7.3 6.4 - 8.3 g/dL    Albumin 4.6 3.5 - 5.2 g/dL    Bilirubin Total 0.4 <=1.2 mg/dL    GFR Estimate 73 >60 mL/min/1.73m2   CBC with platelets and differential   Result Value Ref Range    WBC Count 5.9 4.0 - 11.0 10e3/uL    RBC Count 5.27 (H) 3.80 - 5.20 10e6/uL    Hemoglobin 14.7 11.7 - 15.7 g/dL    Hematocrit 45.9 35.0 - 47.0 %    MCV 87 78 - 100 fL    MCH 27.9 26.5 - 33.0 pg    MCHC 32.0 31.5 - 36.5 g/dL    RDW 13.3 10.0 - 15.0 %    Platelet  Count 235 150 - 450 10e3/uL    % Neutrophils 58 %    % Lymphocytes 28 %    % Monocytes 9 %    % Eosinophils 4 %    % Basophils 1 %    % Immature Granulocytes 0 %    NRBCs per 100 WBC 0 <1 /100    Absolute Neutrophils 3.4 1.6 - 8.3 10e3/uL    Absolute Lymphocytes 1.7 0.8 - 5.3 10e3/uL    Absolute Monocytes 0.5 0.0 - 1.3 10e3/uL    Absolute Eosinophils 0.3 0.0 - 0.7 10e3/uL    Absolute Basophils 0.1 0.0 - 0.2 10e3/uL    Absolute Immature Granulocytes 0.0 <=0.4 10e3/uL    Absolute NRBCs 0.0 10e3/uL       Imaging    No results found.    A total of 45 min were spent today on this visit which included face to face conversation with the patient, EMR review, counseling and co-ordination of care and medical documentation.      Signed by: Cory Carrasco MD      Again, thank you for allowing me to participate in the care of your patient.        Sincerely,        Cory Carrasco MD

## 2023-09-08 NOTE — PROGRESS NOTES
[facility] Hematology and Oncology Progress Note    Patient: Cordelia Thorne  MRN: [id number]  9/08/23        Reason for Visit    Chief Complaint   Patient presents with    Oncology Clinic Visit     3 month return Invasive ductal carcinoma of breast, stage 1, right (H), review labs          Problem List Items Addressed This Visit          Other    Invasive ductal carcinoma of breast, stage 1, right (H) - Primary     Other Visit Diagnoses       Age-related osteoporosis without current pathological fracture        Relevant Medications    CALCIUM CITRATE PO                Assessment and Plan  Early right-sided hormone receptor positive and HR negative breast cancer, status postlumpectomy and adjuvant radiation  T2N0.  Pathologic prognostic stage Ia.  Oncotype DX 23.  Currently on letrozole and doing well.  She tells me that this has gone much better than what she expected.  She was very apprehensive and anxious prior to starting it.  Clinical exam unremarkable today.  She continues to have significant anxiety and concerns about Zometa.  She has significant osteoporosis.  I explained to her that it would be beneficial for her to be on a bisphosphonate which would prevent osteoporotic fractures especially hip fracture.  We again went in detail about potential side effects and complications associated with Zometa.  She would like to think more.  I also asked her to touch base with her dentist to make sure that she does not need any dental work done.  He can start bisphosphonate therapy after all those work-up are completed.    For now I will see her back in 4 months with tentative Zometa on the same day.     Cancer Staging   Invasive ductal carcinoma of breast, stage 1, right (H)  Staging form: Breast, AJCC 8th Edition  - Pathologic: Stage IA (pT2, pN0, cM0, G2, ER+, WI+, HER2-) - Signed by Cheyenne Jaimes MD on 4/12/2023      ECOG Performance    1 - Can't do physically strenuous work, but fully ambyulatory and  can do light sedentary work         Problem List    Patient Active Problem List   Diagnosis    Vertigo    Other specified phobia    Invasive ductal carcinoma of breast, stage 1, right (H)            Interval History   Cordelia CABALLERO Parent is a 77 year old female with early-stage right-sided hormone receptor positive and HER2 negative breast cancer status postlumpectomy and adjuvant radiation who is currently on endocrine therapy with letrozole is here for follow-up.    She has done fairly well on letrozole.  No significant side effects.  Previously had DEXA scan had shown osteoporosis and I recommended starting bisphosphonate therapy with Zometa.  She was very apprehensive about this.  She is very anxious and worried about her cancer coming back.  Had a lot of questions regarding diet that could cause cancer and potential side effects from Femara and Zometa going forward.  No new lumps or bumps reported.        Review of Systems  A comprehensive review of systems was negative except for what is noted in the interval history    Current Outpatient Medications   Medication    CALCIUM CITRATE PO    Lactobacillus rhamnosus GG (CULTURELLE) 15 billion cell CpSP    letrozole (FEMARA) 2.5 MG tablet    Omega-3 Fatty Acids (FISH OIL) 1200 MG capsule    Vitamin D (Cholecalciferol) 50 MCG (2000 UT) CAPS    Ascorbic Acid (VITAMIN C) 500 MG CAPS    Calcium Carbonate-Vitamin D (CALCIUM-VITAMIN D3) 600-3.125 MG-MCG TABS    MISC NATURAL PRODUCTS PO    vitamin E 400 units TABS     No current facility-administered medications for this visit.        Physical Exam        General: alert and cooperative  HEENT: Head: Normal, normocephalic, atraumatic.  Eye: Normal external eye, conjunctiva, lids cornea, VERONIKA.  Chest: CTA bilaterally.  Breast: Lumpectomy scar noted in the right breast.  No new masses or lumps felt.  No evidence of bilateral axilla adenopathy..  CNS: Alert and oriented x3, neurologic exam grossly normal.    Lab Results    No  results found for this or any previous visit (from the past 168 hour(s)).      Imaging    No results found.    A total of 35 min were spent today on this visit which included face to face conversation with the patient, EMR review, counseling and co-ordination of care and medical documentation.        Signed by: Cory Carrasco MD

## 2023-09-08 NOTE — PROGRESS NOTES
"Oncology Rooming Note    September 8, 2023 12:22 PM   Cordelia Thorne is a 78 year old female who presents for:    Chief Complaint   Patient presents with    Oncology Clinic Visit     3 month return Invasive ductal carcinoma of breast, stage 1, right (H), review labs      Initial Vitals: /58 (BP Location: Left arm, Patient Position: Sitting, Cuff Size: Adult Regular)   Pulse 59   Resp 24   Ht 1.626 m (5' 4\")   Wt 70.8 kg (156 lb)   SpO2 96%   BMI 26.78 kg/m   Estimated body mass index is 26.78 kg/m  as calculated from the following:    Height as of this encounter: 1.626 m (5' 4\").    Weight as of this encounter: 70.8 kg (156 lb). Body surface area is 1.79 meters squared.  No Pain (0) Comment: Data Unavailable   No LMP recorded. Patient is postmenopausal.  Allergies reviewed: Yes  Medications reviewed: Yes    Medications: Medication refills not needed today.  Pharmacy name entered into [employer]: [id number] [address] AT [facility]    Clinical concerns:  3 month return Invasive ductal carcinoma of breast, stage 1, right (H), review labs       Geni Penaloza CMA            "

## 2023-09-22 ENCOUNTER — TELEPHONE (OUTPATIENT)
Dept: ONCOLOGY | Facility: HOSPITAL | Age: 78
End: 2023-09-22
Payer: COMMERCIAL

## 2023-09-22 NOTE — TELEPHONE ENCOUNTER
Marina calls with concerns about getting Zometa; its dosage, frequency and potential side effects.     I listened to all her concerns and reassured her that I would review with Dr. Carrasco. I plan to speak with MD and call Marina back next week.     Cat Ugalde  RN Care Coordinator  [facility]

## 2023-09-22 NOTE — TELEPHONE ENCOUNTER
Marina called and left a message asking questions regarding bisphosphonates, how long she will need to take and are there any alternatives. She also ahs questions about bone loss and the letrozole. She did not get any information on her last visit and did not take notes so she is calling to get her questions answered. YANI Talbert RN, OCN, CBCN

## 2023-10-13 ENCOUNTER — PATIENT OUTREACH (OUTPATIENT)
Dept: ONCOLOGY | Facility: HOSPITAL | Age: 78
End: 2023-10-13
Payer: COMMERCIAL

## 2023-10-13 NOTE — PROGRESS NOTES
"[facility]: Cancer Care                                                                                      Pt called and left message on triage line with questions from last week about Zometa. RN Cancer Care Coordinator returned her call.    She has been to the dentist and is approved to move forward. Still has questions about the medication, which were made more complex by her conversation with dentist. Writer let her know that problems with jaw bone issues are associated with poor oral hygiene. She reports she spends 1 hour every night on oral hygiene.    The dentist did a whole jaw xray. She said that her dentist questioned whether she really needed to get Zometa, asking if there is something natural she can do instead. She reportedly said \"Zometa is a poison\" and referring to it as \"chemotherapy\".  She talked about Vitamin K, Magnesium glycinate, and Vitamin C. She wants to know if she can try these supplements. She has read that calcium absorption is not as good when it is ingested with certain foods, so now she is attempting to adjust her diet to enhance the calcium absorption.  She has started a weight bearing PT program.    Based on something pharmacist Montana Myers said to her about Zometa and cancer patients, she asked several times \"How much Zometa am I to get? And for how many years?\"  Writer let her know that the plan has not been written, and this dosing is up to Dr. Carrasco.    As she talks about her questions, her voice gets more tense and anxious, speaking more quickly as she sounds more overwhelmed by her anxiety. Writer is calm with her, and empathetic that sometimes the choices are difficult to make.    Writer reminded her that she already has osteoporosis. It is unclear if these dietary supplemental approaches are effective enough when she already has a medical problem that needs to be addressed. Writer let her know that I would send a message to scheduling for her to get in to see  " Danilo to ask her questions in 3 - 4 weeks.     Signature:  Shari Boudreaux RN

## 2023-10-18 ENCOUNTER — TELEPHONE (OUTPATIENT)
Dept: ONCOLOGY | Facility: HOSPITAL | Age: 78
End: 2023-10-18
Payer: COMMERCIAL

## 2023-10-18 NOTE — TELEPHONE ENCOUNTER
Patient calls in today and leaves a message on the nurse triage line while I was on the phone with another patient.  She wanted to follow-up with KAMALA Mccarthy care coordinator with Dr Carrasco.  She could not get an appointment with Dr Carrasco until 11/8 but she wants to start some supplements before then.  She wants to know if she can take vitamin K2, magnesium gluconate and vitamin C while she is taking the letrozole.  She states that she has done research on these and these will help strengthen her bones as well.  If she can take them, she would like to know what dosage is safe for her to take.  I will get this routed over to Shari to discuss with Dr Carrasco and update the patient.    Xenia Sykes RN

## 2023-10-27 ENCOUNTER — TELEPHONE (OUTPATIENT)
Dept: ONCOLOGY | Facility: HOSPITAL | Age: 78
End: 2023-10-27
Payer: COMMERCIAL

## 2023-10-27 NOTE — ORAL ONC MGMT
Marina called into oral chemotherapy phone with questions. Her dentist recently told her that Zometa was poison and to take vitamin K, vitamin C and Magnesium Glycinate instead. I advised her that there are no studies showing that these supplements will reverse bone loss, while I see no problem with her taking a little magnesium and vitamin C, I advised against her taking vitamin K. Vitamin K is a fat-soluble vitamin and involved in the clotting factor pathway, so I wouldn't want Marina to get too much. I did go over vitamin K rich foods with her and it sounds like she consumes a lot of the foods already so is likely getting plenty of vitamin K in her diet. I also went over that I have never heard nor can find any evidence of Zometa causing leukemia. She had many other questions regarding her letrozole and Zometa that I answered to the best of my ability.   She had read through the list of side effects of Zometa and was scared. I told her that they had to put every side effect that has happened to people while on Zometa, whether or not it was acutally caused by the Zometa and told her the majority of people who get it every 6 months tolerate it very well.

## 2023-11-03 ENCOUNTER — PATIENT OUTREACH (OUTPATIENT)
Dept: ONCOLOGY | Facility: HOSPITAL | Age: 78
End: 2023-11-03
Payer: COMMERCIAL

## 2023-11-03 NOTE — PROGRESS NOTES
[facility]: Cancer Care                                                                                      Called patient, as she has been on writer's list for a couple of weeks to call. Writer apologized for the delay and let her know that it was a great idea to reach out to pharmacy with her questions. She got better information from Pat [facility], that writer would have been able to offer.     She sounded like she is following Pat's advice. She has ordered magnesium from a reputable supplement company.   Writer told her further questions can be asked of Dr. Carrasco when she sees him next week.  She also saw her PCP, and was able to ask questions there.one concept she is considering asking Dr. Carrasco about is taking the supplements and doing weight bearing exercise and comparing bone scan next year to see if it has improved.     Another question is how soon would the Zometa take to protect her from broken bones if she did fall.       She confirmed her appt for next week.    Signature:  Shari Boudreaux RN

## 2023-11-06 ENCOUNTER — TELEPHONE (OUTPATIENT)
Dept: ONCOLOGY | Facility: HOSPITAL | Age: 78
End: 2023-11-06
Payer: COMMERCIAL

## 2023-11-06 NOTE — ORAL ONC MGMT
"Oral Chemotherapy Monitoring Program     Received call from Marina today wondering what my thoughts about vitamin K2, magnesium glycinate, and vitamin C as herbal supplements to take instead of the zoledronic acid for her osteoposis. Marina's dentist had called the zoledronic acid \"poison\" and Marina shared how now she is very scared to take it. I tried to reframe it as the dentist may not like zoledronic acid since it can cause ONJ, but and orthopedist would like zoledronic acid since it prevents broken bones. Marina shared that her dental surgeon did clear her for zoledronic acid and is in favor of her receiving it due to her osteoporosis.   We reviewed the risk factors of ONJ including needing a significant dental procedure (but how she was cleared by her surgeon) and the more doses of zoledronic acid that a patient receives the higher the risk of ONJ. Marina would only be receiving zoledronic every 6 months where as we have many patients that get the same dose every single month and they are at much higher risk.   I also pointed out that as people get older our bodies loose the ability to rebuild bone and natural supplements and calcium alone frequently isn't enough to recover what has been lost. Since Marina is 78 I'm not surprised that she has osteoporosis. There is no significant data to support the supplements in this case, but I did point out to Marina that Vitamin K2 is different than Vitamin K if she does decide to go that route. I did encourage her to continue exercising and using weights as that will only help her muscles and bones in the future but encouraged her to continue to consider the zoledronic acid.     Her PCP did suggest a possible route would be to try the exercises and supplements (didn't want her to take a vitamin K supplement, but could eat vitamin K rich foods) and then rescan her bones next year and if still low then go the zoledronic acid route.     Marina said she has a lot to continue to consider but " thanked me for the information and time.    Montana Myers, PharmD, [facility]  Clinical Oncology Pharmacist  November 6, 2023

## 2023-11-08 ENCOUNTER — ONCOLOGY VISIT (OUTPATIENT)
Dept: ONCOLOGY | Facility: HOSPITAL | Age: 78
End: 2023-11-08
Attending: INTERNAL MEDICINE
Payer: COMMERCIAL

## 2023-11-08 VITALS
OXYGEN SATURATION: 97 % | WEIGHT: 160.9 LBS | TEMPERATURE: 97.8 F | BODY MASS INDEX: 27.47 KG/M2 | DIASTOLIC BLOOD PRESSURE: 60 MMHG | HEIGHT: 64 IN | SYSTOLIC BLOOD PRESSURE: 126 MMHG | HEART RATE: 61 BPM

## 2023-11-08 DIAGNOSIS — C50.911 INVASIVE DUCTAL CARCINOMA OF BREAST, STAGE 1, RIGHT (H): Primary | ICD-10-CM

## 2023-11-08 DIAGNOSIS — Z23 NEED FOR PROPHYLACTIC VACCINATION AND INOCULATION AGAINST INFLUENZA: ICD-10-CM

## 2023-11-08 DIAGNOSIS — M81.0 AGE-RELATED OSTEOPOROSIS WITHOUT CURRENT PATHOLOGICAL FRACTURE: Primary | ICD-10-CM

## 2023-11-08 DIAGNOSIS — C50.911 INVASIVE DUCTAL CARCINOMA OF BREAST, STAGE 1, RIGHT (H): ICD-10-CM

## 2023-11-08 PROCEDURE — 99215 OFFICE O/P EST HI 40 MIN: CPT | Performed by: INTERNAL MEDICINE

## 2023-11-08 PROCEDURE — G0463 HOSPITAL OUTPT CLINIC VISIT: HCPCS | Performed by: INTERNAL MEDICINE

## 2023-11-08 ASSESSMENT — PAIN SCALES - GENERAL: PAINLEVEL: NO PAIN (1)

## 2023-11-08 NOTE — LETTER
"    11/8/2023         RE: Cordelia Thorne  [address] [address]  [facility] [address]        Dear Colleague,    Thank you for referring your patient, Cordelia Thorne, to the [facility] [place]. Please see a copy of my visit note below.    Oncology Rooming Note    November 8, 2023 12:47 PM   Cordelia Thorne is a 78 year old female who presents for:    Chief Complaint   Patient presents with     Oncology Clinic Visit     Return visit to discuss multiple questions and concerns and related to Invasive ductal carcinoma of breast, stage 1, right     Initial Vitals: /60 (BP Location: Left arm, Patient Position: Sitting, Cuff Size: Adult Regular)   Pulse 61   Temp 97.8  F (36.6  C) (Oral)   Ht 1.626 m (5' 4\")   Wt 73 kg (160 lb 14.4 oz)   SpO2 97%   BMI 27.62 kg/m   Estimated body mass index is 27.62 kg/m  as calculated from the following:    Height as of this encounter: 1.626 m (5' 4\").    Weight as of this encounter: 73 kg (160 lb 14.4 oz). Body surface area is 1.82 meters squared.  No Pain (1) Comment: Data Unavailable   No LMP recorded. Patient is postmenopausal.  Allergies reviewed: Yes  Medications reviewed: Yes    Medications: Medication refills not needed today.  Pharmacy name entered into [facility]: [id number] [address] AT [facility] OF [facility]    Clinical concerns: Return visit to discuss multiple questions and concerns and related to Invasive ductal carcinoma of breast, stage 1, right       Anna Doran MA              [facility] Hematology and Oncology Progress Note    Patient: Cordelia Thorne  MRN: [id number]  11/08/23        Reason for Visit    Chief Complaint   Patient presents with     Oncology Clinic Visit     Return visit to discuss multiple questions and concerns and related to Invasive ductal carcinoma of breast, stage 1, right         Problem List Items Addressed This Visit    None            Assessment and Plan  Early " right-sided hormone receptor positive and HR negative breast cancer, status postlumpectomy and adjuvant radiation  T2N0.  Pathologic prognostic stage Ia.  Oncotype DX 23.  Currently on letrozole and doing well.  She also has significant osteoporosis and I recommended starting bisphosphonates for this.  She continues to be really apprehensive about this.  She is worried about all the side effects associated with Zometa.  I again spent a lot of time today talking to her regarding each and every side effect of Zometa and the likelihood and the rationale behind doing this.  She also wanted to know about a lot of supplements that she could be taking to help to prevent her cancer coming back.  I explained to her that we did not have any recommendations in regards to taking supplements other than vitamin D and calcium for the bones.  Recommended regular exercise and healthy diet.    Ultimately she is still undecided about starting bisphosphonates.  She will continue letrozole for now.  Will see her back in January.     Cancer Staging   Invasive ductal carcinoma of breast, stage 1, right (H)  Staging form: Breast, AJCC 8th Edition  - Pathologic: Stage IA (pT2, pN0, cM0, G2, ER+, UT+, HER2-) - Signed by Cheyenne Jaimes MD on 4/12/2023      ECOG Performance    1 - Can't do physically strenuous work, but fully ambyulatory and can do light sedentary work         Problem List    Patient Active Problem List   Diagnosis     Vertigo     Other specified phobia     Invasive ductal carcinoma of breast, stage 1, right (H)            Interval History   Cordelia CABALLERO Parent is a 77 year old female with early-stage right-sided hormone receptor positive and HER2 negative breast cancer status postlumpectomy and adjuvant radiation who is currently on endocrine therapy with letrozole is here for follow-up.    Doing well on letrozole.  No significant side effects.  She still has not made a decision regarding starting bisphosphonates for her  osteoporosis.  She is here with a lot of questions regarding supplements that she is taking and she could be taking and their potential interactions with Zometa and letrozole.  She continues to have significant anxiety issues and is apprehensive about every single side effect associated with letrozole and Zometa.  No new lumps or bumps reported.      Review of Systems  A comprehensive review of systems was negative except for what is noted in the interval history    Current Outpatient Medications   Medication     Calcium Carbonate-Vitamin D (CALCIUM-VITAMIN D3) 600-3.125 MG-MCG TABS     CALCIUM CITRATE PO     Lactobacillus rhamnosus GG (CULTURELLE) 15 billion cell CpSP     letrozole (FEMARA) 2.5 MG tablet     MAGNESIUM GLYCINATE PO     Omega-3 Fatty Acids (FISH OIL) 1200 MG capsule     Vitamin D (Cholecalciferol) 50 MCG (2000 UT) CAPS     Ascorbic Acid (VITAMIN C) 500 MG CAPS     MISC NATURAL PRODUCTS PO     vitamin E 400 units TABS     No current facility-administered medications for this visit.        Physical Exam        General: alert and cooperative    CNS: Alert and oriented x3, neurologic exam grossly normal.    Lab Results    No results found for this or any previous visit (from the past 168 hour(s)).      Imaging    No results found.    A total of 40 min were spent today on this visit which included face to face conversation with the patient, EMR review, counseling and co-ordination of care and medical documentation.          Signed by: Cory Carrasco MD      Again, thank you for allowing me to participate in the care of your patient.        Sincerely,        Cory Carrasco MD

## 2023-11-08 NOTE — PROGRESS NOTES
"Oncology Rooming Note    November 8, 2023 12:47 PM   Cordelia Thorne is a 78 year old female who presents for:    Chief Complaint   Patient presents with    Oncology Clinic Visit     Return visit to discuss multiple questions and concerns and related to Invasive ductal carcinoma of breast, stage 1, right     Initial Vitals: /60 (BP Location: Left arm, Patient Position: Sitting, Cuff Size: Adult Regular)   Pulse 61   Temp 97.8  F (36.6  C) (Oral)   Ht 1.626 m (5' 4\")   Wt 73 kg (160 lb 14.4 oz)   SpO2 97%   BMI 27.62 kg/m   Estimated body mass index is 27.62 kg/m  as calculated from the following:    Height as of this encounter: 1.626 m (5' 4\").    Weight as of this encounter: 73 kg (160 lb 14.4 oz). Body surface area is 1.82 meters squared.  No Pain (1) Comment: Data Unavailable   No LMP recorded. Patient is postmenopausal.  Allergies reviewed: Yes  Medications reviewed: Yes    Medications: Medication refills not needed today.  Pharmacy name entered into [employer]: [facility] #[id number] [place], [place] - [address] AT [facility]    Clinical concerns: Return visit to discuss multiple questions and concerns and related to Invasive ductal carcinoma of breast, stage 1, right       Anna Doran MA            "

## 2023-11-09 ENCOUNTER — LAB REQUISITION (OUTPATIENT)
Dept: LAB | Facility: CLINIC | Age: 78
End: 2023-11-09
Payer: COMMERCIAL

## 2023-11-09 DIAGNOSIS — Z12.79 ENCOUNTER FOR SCREENING FOR MALIGNANT NEOPLASM OF OTHER GENITOURINARY ORGANS: ICD-10-CM

## 2023-11-09 PROCEDURE — G0145 SCR C/V CYTO,THINLAYER,RESCR: HCPCS | Mod: ORL | Performed by: OBSTETRICS & GYNECOLOGY

## 2023-11-30 ENCOUNTER — PATIENT OUTREACH (OUTPATIENT)
Dept: ONCOLOGY | Facility: HOSPITAL | Age: 78
End: 2023-11-30
Payer: COMMERCIAL

## 2023-11-30 NOTE — TELEPHONE ENCOUNTER
[facility]: Cancer Care                                                                                      RN Cancer Care Coordinator received call in from patient. She wants to drop off a small thank you item for Dr. Carrasco and has a couple of questions about logistics of this.  She voices how grateful she is to him and to Dr. Jaimes for their attentive care.    She also asks about Letrozole and whether is causes an increase in facial wrinkles. She feels like she is looking older suddenly. Writer checked the Up To Date resource which does not mention wrinkling as a side effect. Writer reminded her that when hormones change our bodies change, but writer does not have any more detailed information. Also reminded her that this year has been stressful in several ways for her, and she may be responding to this and other factors.     Pt voiced understanding and acknowledged it is multifactorial.   She comments that she will see Dr. Jaimes next week, and Dr. Carrasco in January. Voiced appreciation for the call.    Signature:  Shari Boudreaux RN   Cancer Care Nurse Coordinator  [facility]  [phone]

## 2023-12-04 NOTE — PROGRESS NOTES
[facility] Hematology and Oncology Progress Note    Patient: Cordelia CABALLERO Parent  MRN: [id number]  11/08/23        Reason for Visit    Chief Complaint   Patient presents with    Oncology Clinic Visit     Return visit to discuss multiple questions and concerns and related to Invasive ductal carcinoma of breast, stage 1, right         Problem List Items Addressed This Visit    None            Assessment and Plan  Early right-sided hormone receptor positive and HR negative breast cancer, status postlumpectomy and adjuvant radiation  T2N0.  Pathologic prognostic stage Ia.  Oncotype DX 23.  Currently on letrozole and doing well.  She also has significant osteoporosis and I recommended starting bisphosphonates for this.  She continues to be really apprehensive about this.  She is worried about all the side effects associated with Zometa.  I again spent a lot of time today talking to her regarding each and every side effect of Zometa and the likelihood and the rationale behind doing this.  She also wanted to know about a lot of supplements that she could be taking to help to prevent her cancer coming back.  I explained to her that we did not have any recommendations in regards to taking supplements other than vitamin D and calcium for the bones.  Recommended regular exercise and healthy diet.    Ultimately she is still undecided about starting bisphosphonates.  She will continue letrozole for now.  Will see her back in January.     Cancer Staging   Invasive ductal carcinoma of breast, stage 1, right (H)  Staging form: Breast, AJCC 8th Edition  - Pathologic: Stage IA (pT2, pN0, cM0, G2, ER+, VT+, HER2-) - Signed by Cheyenne Jaimes MD on 4/12/2023      ECOG Performance    1 - Can't do physically strenuous work, but fully ambyulatory and can do light sedentary work         Problem List    Patient Active Problem List   Diagnosis    Vertigo    Other specified phobia    Invasive ductal carcinoma of breast, stage 1, right  (H)            Interval History   Cordelia CABALLERO Parent is a 77 year old female with early-stage right-sided hormone receptor positive and HER2 negative breast cancer status postlumpectomy and adjuvant radiation who is currently on endocrine therapy with letrozole is here for follow-up.    Doing well on letrozole.  No significant side effects.  She still has not made a decision regarding starting bisphosphonates for her osteoporosis.  She is here with a lot of questions regarding supplements that she is taking and she could be taking and their potential interactions with Zometa and letrozole.  She continues to have significant anxiety issues and is apprehensive about every single side effect associated with letrozole and Zometa.  No new lumps or bumps reported.      Review of Systems  A comprehensive review of systems was negative except for what is noted in the interval history    Current Outpatient Medications   Medication    Calcium Carbonate-Vitamin D (CALCIUM-VITAMIN D3) 600-3.125 MG-MCG TABS    CALCIUM CITRATE PO    Lactobacillus rhamnosus GG (CULTURELLE) 15 billion cell CpSP    letrozole (FEMARA) 2.5 MG tablet    MAGNESIUM GLYCINATE PO    Omega-3 Fatty Acids (FISH OIL) 1200 MG capsule    Vitamin D (Cholecalciferol) 50 MCG (2000 UT) CAPS    Ascorbic Acid (VITAMIN C) 500 MG CAPS    MISC NATURAL PRODUCTS PO    vitamin E 400 units TABS     No current facility-administered medications for this visit.        Physical Exam        General: alert and cooperative    CNS: Alert and oriented x3, neurologic exam grossly normal.    Lab Results    No results found for this or any previous visit (from the past 168 hour(s)).      Imaging    No results found.    A total of 40 min were spent today on this visit which included face to face conversation with the patient, EMR review, counseling and co-ordination of care and medical documentation.          Signed by: Cory Carrasco MD

## 2023-12-06 ENCOUNTER — OFFICE VISIT (OUTPATIENT)
Dept: RADIATION ONCOLOGY | Facility: HOSPITAL | Age: 78
End: 2023-12-06
Attending: STUDENT IN AN ORGANIZED HEALTH CARE EDUCATION/TRAINING PROGRAM
Payer: COMMERCIAL

## 2023-12-06 VITALS
HEART RATE: 65 BPM | WEIGHT: 159.9 LBS | RESPIRATION RATE: 16 BRPM | SYSTOLIC BLOOD PRESSURE: 150 MMHG | DIASTOLIC BLOOD PRESSURE: 69 MMHG | BODY MASS INDEX: 27.45 KG/M2 | OXYGEN SATURATION: 95 %

## 2023-12-06 DIAGNOSIS — C50.911 INVASIVE DUCTAL CARCINOMA OF BREAST, STAGE 1, RIGHT (H): Primary | ICD-10-CM

## 2023-12-06 PROCEDURE — G0463 HOSPITAL OUTPT CLINIC VISIT: HCPCS | Performed by: STUDENT IN AN ORGANIZED HEALTH CARE EDUCATION/TRAINING PROGRAM

## 2023-12-06 PROCEDURE — 99215 OFFICE O/P EST HI 40 MIN: CPT | Performed by: STUDENT IN AN ORGANIZED HEALTH CARE EDUCATION/TRAINING PROGRAM

## 2023-12-06 NOTE — PROGRESS NOTES
[facility] Radiation Oncology Follow Up     Patient: Cordelia Thorne  MRN: [id number]  Date of Service: 12/06/2023       DISEASE TREATED:  77-year-old postmenopausal woman with Stage pT2, pN0 invasive ductal carcinoma of the right breast, grade 2, ER/AK positive, HER2/davon negative status post lumpectomy and sentinel lymph node biopsy with negative margins.  Oncotype DX: 23.       TYPE OF RADIATION THERAPY ADMINISTERED:   SITE TREATED: Right breast  TOTAL DOSE: 2600 cGy  NUMBER OF FRACTIONS: 5 fractions  DATES COMPLETED: 5/3/2023 - 5/9/2023  CONCURRENT CHEMOTHERAPY: No  ADJUVANT THERAPY:Yes     INTERVAL SINCE COMPLETION OF RADIATION THERAPY: 7 months      SUBJECTIVE:  Ms. Thorne is a 78 year old female who who had an abnormal screen mamogram.     2/13/2023 diagnostic mammogram:  irregular spiculated mass within the right breast.  Punctate calcifications in the segmental distribution within the left breast brown in from the 1:00 to 2:00 positions, anterior posterior depth measuring 2.9 x 6.9 x 6.1 cm in total dimension.    Ultrasound: Right breast at the 12 o'clock position, 4 cm from the nipple demonstrates a 1.1 x 0.9 x 1.6 cm hypoechoic mass with spiculated margins and associated architectural distortion.  Right axilla evaluation demonstrates a few nonenlarged and morphologically normal lymph nodes.  BI-RADS 5.     2/22/2023 ultrasound-guided core needle biopsy right breast  Invasive ductal carcinoma, grade 2, ER positive (100%), AK positive (10 to 20%), HER2/davon equivocal by IHC, negative by FISH     2/22/2023 stereotactic vacuum-assisted left breast biopsy  Benign breast tissue focal columnar cell change and moderate usual ductal hyperplasia     3/16/2023 right breast lumpectomy sentinel lymph node biopsy, pathology ([id number]): Invasive ductal carcinoma, grade 2, 25 x 20 x 10 mm in size.  Margins negative (3 mm medial).  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  Margins negative (3 mm medial and lateral).  Right axillary lymph node benign (0/1).  Stage pT2, pN0.  Oncotype DX: 23.     Completed radiation therapy as above and returns to clinic today for routine follow-up visit. Resolution of skin irritation.  No swelling of breast or upper extremity.  She is tolerating Letrazole- no side effects.  No new concering symptoms.  Questions today, answered.     She is taking VitD and Ca in diet and supplemented.  Bisphosphonate has not been started as she has some concerns.  Increased activity- Walking an hour per day and doing exercise,     Medications were reviewed and are up to date on EPIC.    The following portions of the patient's history were reviewed and updated as appropriate: allergies, current medications, past family history, past medical history, past social history, past surgical history and problem list.    Review of Systems:      General  Constitutional  Constitutional (WDL): All constitutional elements are within defined limits  EENT  Eye Disorders  Eye Disorder (WDL): Assessment not pertinent to visit  Ear Disorders  Ear Disorder (WDL): Assessment not pertinent to visit  Respiratory  Respiratory  Respiratory (WDL): All respiratory elements are within defined limits  Cardiovascular  Cardiovascular  Cardiovascular (WDL): All cardiovascular elements are within defined limits  Gastrointestinal  Gastrointestinal  Gastrointestinal (WDL): All gastrointestinal elements are within defined limits  Musculoskeletal  Musculoskeletal and Connective Tissue Disorders  Musculoskeletal & Connective (WDL): Exceptions to WDL  Arthralgia: Mild pain (low back)  Integumentary  Integumentary  Integumentary (WDL): All integumentary elements are within defined limits  Neurological  Neurosensory  Neurosensory (WDL): All neurosensory elements are within defined limits  Genitourinary/Reproductive  Genitourinary  Genitourinary (WDL): All  genitourinary elements are within defined limits  Lymphatic  Lymph System Disorders  Lymph (WDL): All lymph elements are within defined limits  Pain  Pain Score: No Pain (0)  AUA Assessment                                                              Accompanied by  Accompanied By: self only    Objective:        PHYSICAL EXAMINATION:    BP (!) 150/69   Pulse 65   Resp 16   Wt 72.5 kg (159 lb 14.4 oz)   SpO2 95%   BMI 27.45 kg/m      Gen: Alert, in NAD pleasant interactive, well nourished  Eyes:  EOMI, sclera anicteric  HENT     Head: NC/AT  Pulm: No wheezing, stridor or respiratory distress  Chest: Breast asymmetry: R<L.  She has no RT dermatitis or skin changes.  Good ROM, no lymphedema or other acute or subacute sequelae of her radiation. No palpable breast masses or LAD.   Musculoskeletal: Normal muscle bulk and tone  Skin: Normal tone and turgor, warm, dry, intact  Neurologic: A/Ox3, face symmetric, speech fluent, no focal motor deficits, gait normal and unaided  Psychiatric: Appropriate mood and affect        Imaging: Imaging results 6 weeks:No results found.     Impression   77-year-old postmenopausal woman with Stage pT2, pN0 invasive ductal carcinoma of the right breast, grade 2, ER/IN positive, HER2/davon negative status post lumpectomy and sentinel lymph node biopsy with negative margins.Oncotype DX: 23.     S/p Adjuvant RT recovered from acute side effects of RT and no evidence of residual or recurrent disease.     Visit Dx:    (C50.911) Invasive ductal carcinoma of breast, stage 1, right (H)  (primary encounter diagnosis)       Cancer Staging   Invasive ductal carcinoma of breast, stage 1, right (H)  Staging form: Breast, AJCC 8th Edition  - Pathologic: Stage IA (pT2, pN0, cM0, G2, ER+, IN+, HER2-) - Signed by Cheyenne Jaimes MD on 4/12/2023      Assessment & Plan:   1. Follow-up with medical oncology while on adjuvant endocrine therapy     2.  Mammogram indicated-12-months (next in February  2024)     3.  Return to clinic in 6 months or sooner if needed    4.  Patient called and would like lymphedema referral (no current lymphedema)-- placed.      Pain Management Plan: N/A    List of questions answered.   Total time of this visit, including time spent face-to-face with patient and or via video/audio, and also in preparing for today's visit for MDM and documentation. Medical decision-making included consideration and possible diagnoses, management options, complex record review, review of diagnostic tests and information, consideration and discussion of significant complications based on comorbidities, discussion with providers involved in the care of the patient.     60 Minutes spent.      Cheyenne Jaimes MD  Department of Radiation Oncology   [facility]  Tel: [phone]  Page: [phone]    [address] [address]     [address] [place] [address]    CC:  Patient Care Team:  Shahid Ruggiero MD as PCP - General (Family Medicine)  Cheyenne Jaimes MD as MD (Radiation Oncology)  Xenia Garcia DO as MD (Surgery)  Xenia Garcia DO as Assigned Surgical Provider  Cory Carrasco MD as MD (Medical Oncology)  Cheyenne Jaimes MD as Assigned Cancer Care Provider  Shari Boudreaux RN as Specialty Care Coordinator (Hematology & Oncology)

## 2023-12-06 NOTE — LETTER
12/6/2023         RE: Cordelia Thorne  [address] [address]  [facility] [address]        Dear Colleague,    Thank you for referring your patient, Cordelia Thorne, to the [facility] RADIATION ONCOLOGY [place]. Please see a copy of my visit note below.    [facility] Radiation Oncology Follow Up     Patient: Cordelia Thorne  MRN: [id number]  Date of Service: 12/06/2023       DISEASE TREATED:  77-year-old postmenopausal woman with Stage pT2, pN0 invasive ductal carcinoma of the right breast, grade 2, ER/UT positive, HER2/davon negative status post lumpectomy and sentinel lymph node biopsy with negative margins.  Oncotype DX: 23.       TYPE OF RADIATION THERAPY ADMINISTERED:   SITE TREATED: Right breast  TOTAL DOSE: 2600 cGy  NUMBER OF FRACTIONS: 5 fractions  DATES COMPLETED: 5/3/2023 - 5/9/2023  CONCURRENT CHEMOTHERAPY: No  ADJUVANT THERAPY:Yes     INTERVAL SINCE COMPLETION OF RADIATION THERAPY: 7 months      SUBJECTIVE:  Ms. Thorne is a 78 year old female who who had an abnormal screen mamogram.     2/13/2023 diagnostic mammogram:  irregular spiculated mass within the right breast.  Punctate calcifications in the segmental distribution within the left breast brown in from the 1:00 to 2:00 positions, anterior posterior depth measuring 2.9 x 6.9 x 6.1 cm in total dimension.    Ultrasound: Right breast at the 12 o'clock position, 4 cm from the nipple demonstrates a 1.1 x 0.9 x 1.6 cm hypoechoic mass with spiculated margins and associated architectural distortion.  Right axilla evaluation demonstrates a few nonenlarged and morphologically normal lymph nodes.  BI-RADS 5.     2/22/2023 ultrasound-guided core needle biopsy right breast  Invasive ductal carcinoma, grade 2, ER positive (100%), UT positive (10 to 20%), HER2/davon equivocal by IHC, negative by FISH     2/22/2023 stereotactic vacuum-assisted left breast biopsy  Benign breast tissue focal columnar cell change and moderate usual ductal hyperplasia     3/16/2023  right breast lumpectomy sentinel lymph node biopsy, pathology ([id number]): Invasive ductal carcinoma, grade 2, 25 x 20 x 10 mm in size.  Margins negative (3 mm medial).  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  Margins negative (3 mm medial and lateral).  Right axillary lymph node benign (0/1).  Stage pT2, pN0.  Oncotype DX: 23.     Completed radiation therapy as above and returns to clinic today for routine follow-up visit. Resolution of skin irritation.  No swelling of breast or upper extremity.  She is tolerating Letrazole- no side effects.  No new concering symptoms.  Questions today, answered.     She is taking VitD and Ca in diet and supplemented.  Bisphosphonate has not been started as she has some concerns.  Increased activity- Walking an hour per day and doing exercise,     Medications were reviewed and are up to date on EPIC.    The following portions of the patient's history were reviewed and updated as appropriate: allergies, current medications, past family history, past medical history, past social history, past surgical history and problem list.    Review of Systems:      General  Constitutional  Constitutional (WDL): All constitutional elements are within defined limits  EENT  Eye Disorders  Eye Disorder (WDL): Assessment not pertinent to visit  Ear Disorders  Ear Disorder (WDL): Assessment not pertinent to visit  Respiratory  Respiratory  Respiratory (WDL): All respiratory elements are within defined limits  Cardiovascular  Cardiovascular  Cardiovascular (WDL): All cardiovascular elements are within defined limits  Gastrointestinal  Gastrointestinal  Gastrointestinal (WDL): All gastrointestinal elements are within defined limits  Musculoskeletal  Musculoskeletal and Connective Tissue Disorders  Musculoskeletal & Connective (WDL): Exceptions to WDL  Arthralgia: Mild pain (low  back)  Integumentary  Integumentary  Integumentary (WDL): All integumentary elements are within defined limits  Neurological  Neurosensory  Neurosensory (WDL): All neurosensory elements are within defined limits  Genitourinary/Reproductive  Genitourinary  Genitourinary (WDL): All genitourinary elements are within defined limits  Lymphatic  Lymph System Disorders  Lymph (WDL): All lymph elements are within defined limits  Pain  Pain Score: No Pain (0)  AUA Assessment                                                              Accompanied by  Accompanied By: self only    Objective:        PHYSICAL EXAMINATION:    BP (!) 150/69   Pulse 65   Resp 16   Wt 72.5 kg (159 lb 14.4 oz)   SpO2 95%   BMI 27.45 kg/m      Gen: Alert, in NAD pleasant interactive, well nourished  Eyes:  EOMI, sclera anicteric  HENT     Head: NC/AT  Pulm: No wheezing, stridor or respiratory distress  Chest: Breast asymmetry: R<L.  She has no RT dermatitis or skin changes.  Good ROM, no lymphedema or other acute or subacute sequelae of her radiation. No palpable breast masses or LAD.   Musculoskeletal: Normal muscle bulk and tone  Skin: Normal tone and turgor, warm, dry, intact  Neurologic: A/Ox3, face symmetric, speech fluent, no focal motor deficits, gait normal and unaided  Psychiatric: Appropriate mood and affect        Imaging: Imaging results 6 weeks:No results found.     Impression   77-year-old postmenopausal woman with Stage pT2, pN0 invasive ductal carcinoma of the right breast, grade 2, ER/AL positive, HER2/davon negative status post lumpectomy and sentinel lymph node biopsy with negative margins.Oncotype DX: 23.     S/p Adjuvant RT recovered from acute side effects of RT and no evidence of residual or recurrent disease.     Visit Dx:    (C50.911) Invasive ductal carcinoma of breast, stage 1, right (H)  (primary encounter diagnosis)       Cancer Staging   Invasive ductal carcinoma of breast, stage 1, right (H)  Staging form: Breast,  AJCC 8th Edition  - Pathologic: Stage IA (pT2, pN0, cM0, G2, ER+, VA+, HER2-) - Signed by Cheyenne Jaimes MD on 4/12/2023      Assessment & Plan:   1. Follow-up with medical oncology while on adjuvant endocrine therapy     2.  Mammogram indicated-12-months (next in February 2024)     3.  Return to clinic in 6 months or sooner if needed    4.  Patient called and would like lymphedema referral (no current lymphedema)-- placed.      Pain Management Plan: N/A    List of questions answered.   Total time of this visit, including time spent face-to-face with patient and or via video/audio, and also in preparing for today's visit for MDM and documentation. Medical decision-making included consideration and possible diagnoses, management options, complex record review, review of diagnostic tests and information, consideration and discussion of significant complications based on comorbidities, discussion with providers involved in the care of the patient.     60 Minutes spent.      Cheyenne Jaimes MD  Department of Radiation Oncology   [facility]  Tel: [phone]  Page: [phone]    [facility]  [address] [place] [address]     [address] [address]    CC:  Patient Care Team:  Shahid Ruggiero MD as PCP - General (Family Medicine)  Cheyenne Jaimes MD as MD (Radiation Oncology)  Xenia Garcia DO as MD (Surgery)  Xenia Garcia DO as Assigned Surgical Provider  Cory Carrasco MD as MD (Medical Oncology)  Cheyenne Jaimes MD as Assigned Cancer Care Provider  Shari Boudreaux RN as Specialty Care Coordinator (Hematology & Oncology)      Oncology Rooming Note    December 6, 2023 11:44 AM   Cordelia Thorne is a 78 year old female who presents for:    Chief Complaint   Patient presents with     Oncology Clinic Visit     Breast Cancer     Rad Onc follow up     Initial Vitals: BP (!) 150/69   Pulse 65   Resp 16   Wt 72.5 kg (159 lb 14.4  "oz)   SpO2 95%   BMI 27.45 kg/m   Estimated body mass index is 27.45 kg/m  as calculated from the following:    Height as of 11/8/23: 1.626 m (5' 4\").    Weight as of this encounter: 72.5 kg (159 lb 14.4 oz). Body surface area is 1.81 meters squared.  No Pain (0) Comment: Data Unavailable   No LMP recorded. Patient is postmenopausal.  Allergies reviewed: Yes  Medications reviewed: Yes    Medications: Medication refills not needed today.  Pharmacy name entered into [employer] [place]: [facility] #[id number] [facility] [address] AT [facility]    Clinical concerns: Feeling well, no concerns.   Dr. Jaimes was notified.      Tia Hackett RN                Again, thank you for allowing me to participate in the care of your patient.        Sincerely,        Cheyenne Jaimes MD  "

## 2023-12-06 NOTE — PROGRESS NOTES
"Oncology Rooming Note    December 6, 2023 11:44 AM   Cordelia Thorne is a 78 year old female who presents for:    Chief Complaint   Patient presents with    Oncology Clinic Visit    Breast Cancer     Rad Onc follow up     Initial Vitals: BP (!) 150/69   Pulse 65   Resp 16   Wt 72.5 kg (159 lb 14.4 oz)   SpO2 95%   BMI 27.45 kg/m   Estimated body mass index is 27.45 kg/m  as calculated from the following:    Height as of 11/8/23: 1.626 m (5' 4\").    Weight as of this encounter: 72.5 kg (159 lb 14.4 oz). Body surface area is 1.81 meters squared.  No Pain (0) Comment: Data Unavailable   No LMP recorded. Patient is postmenopausal.  Allergies reviewed: Yes  Medications reviewed: Yes    Medications: Medication refills not needed today.  Pharmacy name entered into [employer]: [facility] #[id number] [facility] [address] AT [facility] OF [facility]    Clinical concerns: Feeling well, no concerns.   Dr. Jaimes was notified.      Tia Hackett RN              "

## 2023-12-19 ENCOUNTER — TELEPHONE (OUTPATIENT)
Dept: ONCOLOGY | Facility: HOSPITAL | Age: 78
End: 2023-12-19
Payer: COMMERCIAL

## 2023-12-19 NOTE — TELEPHONE ENCOUNTER
Received a phone call today from Marina. She wants to see if Dr. Carrasco would be willing to draw labs on 1/10 that would check her blood sugar levels. Marina is interested to learn what her numbers would be. She plans to come fasting. Discussed with [facility]AMALA [facility] today. Will route this message to review and place lab orders. Thank you!    Hannah Rosenberg RN on 12/19/2023 at 3:32 PM

## 2023-12-27 ENCOUNTER — TELEPHONE (OUTPATIENT)
Dept: ONCOLOGY | Facility: HOSPITAL | Age: 78
End: 2023-12-27
Payer: COMMERCIAL

## 2023-12-28 NOTE — TELEPHONE ENCOUNTER
[facility]: Cancer Care                                                                                      RN Cancer Care Coordinator called and left message for patient. She had asked the question if she would be able to have her glucose checked when she comes for labs in early January. Writer clarified that her glucose level will be something that is checked with her lab appt here, but that if she is looking for an A1C that is something done through her primary care clinic.     Signature:  Shari Boudreaux RN

## 2024-01-09 ENCOUNTER — TELEPHONE (OUTPATIENT)
Dept: SURGERY | Facility: CLINIC | Age: 79
End: 2024-01-09
Payer: COMMERCIAL

## 2024-01-09 DIAGNOSIS — C50.911 INVASIVE DUCTAL CARCINOMA OF BREAST, STAGE 1, RIGHT (H): Primary | ICD-10-CM

## 2024-01-09 DIAGNOSIS — M81.0 AGE-RELATED OSTEOPOROSIS WITHOUT CURRENT PATHOLOGICAL FRACTURE: ICD-10-CM

## 2024-01-09 DIAGNOSIS — Z12.31 ENCOUNTER FOR SCREENING MAMMOGRAM FOR BREAST CANCER: Primary | ICD-10-CM

## 2024-01-09 NOTE — TELEPHONE ENCOUNTER
----- Message from Susan L Lombardi, RN sent at 1/9/2024 11:19 AM CST -----  Regarding: Needs [facility]!  Can someone please call Marina to help her set up  her follow up appointment with Dr. Garcia due in February 2024?  She is also needing her mammogram to be done prior to her Dr. Garcia appointment.  I put an order in to make that easier for her.  Can you please give her the number to schedule that as well?    Thank you so much!!!    Beba

## 2024-01-10 ENCOUNTER — ONCOLOGY VISIT (OUTPATIENT)
Dept: ONCOLOGY | Facility: HOSPITAL | Age: 79
End: 2024-01-10
Attending: INTERNAL MEDICINE
Payer: COMMERCIAL

## 2024-01-10 ENCOUNTER — LAB (OUTPATIENT)
Dept: INFUSION THERAPY | Facility: HOSPITAL | Age: 79
End: 2024-01-10
Attending: INTERNAL MEDICINE
Payer: COMMERCIAL

## 2024-01-10 VITALS
HEART RATE: 63 BPM | TEMPERATURE: 97.8 F | SYSTOLIC BLOOD PRESSURE: 143 MMHG | OXYGEN SATURATION: 96 % | BODY MASS INDEX: 28 KG/M2 | RESPIRATION RATE: 18 BRPM | DIASTOLIC BLOOD PRESSURE: 64 MMHG | WEIGHT: 163.1 LBS

## 2024-01-10 DIAGNOSIS — C50.911 INVASIVE DUCTAL CARCINOMA OF BREAST, STAGE 1, RIGHT (H): Primary | ICD-10-CM

## 2024-01-10 DIAGNOSIS — M81.0 AGE-RELATED OSTEOPOROSIS WITHOUT CURRENT PATHOLOGICAL FRACTURE: ICD-10-CM

## 2024-01-10 DIAGNOSIS — C50.911 INVASIVE DUCTAL CARCINOMA OF BREAST, STAGE 1, RIGHT (H): ICD-10-CM

## 2024-01-10 LAB
ALBUMIN SERPL BCG-MCNC: 4.6 G/DL (ref 3.5–5.2)
ALP SERPL-CCNC: 93 U/L (ref 40–150)
ALT SERPL W P-5'-P-CCNC: 33 U/L (ref 0–50)
ANION GAP SERPL CALCULATED.3IONS-SCNC: 10 MMOL/L (ref 7–15)
AST SERPL W P-5'-P-CCNC: 26 U/L (ref 0–45)
BASOPHILS # BLD AUTO: 0.1 10E3/UL (ref 0–0.2)
BASOPHILS NFR BLD AUTO: 1 %
BILIRUB SERPL-MCNC: 0.4 MG/DL
BUN SERPL-MCNC: 23.9 MG/DL (ref 8–23)
CALCIUM SERPL-MCNC: 10.3 MG/DL (ref 8.8–10.2)
CHLORIDE SERPL-SCNC: 104 MMOL/L (ref 98–107)
CREAT SERPL-MCNC: 0.82 MG/DL (ref 0.51–0.95)
DEPRECATED HCO3 PLAS-SCNC: 29 MMOL/L (ref 22–29)
EGFRCR SERPLBLD CKD-EPI 2021: 74 ML/MIN/1.73M2
EOSINOPHIL # BLD AUTO: 0.2 10E3/UL (ref 0–0.7)
EOSINOPHIL NFR BLD AUTO: 4 %
ERYTHROCYTE [DISTWIDTH] IN BLOOD BY AUTOMATED COUNT: 13.1 % (ref 10–15)
GLUCOSE SERPL-MCNC: 116 MG/DL (ref 70–99)
HCT VFR BLD AUTO: 44.4 % (ref 35–47)
HGB BLD-MCNC: 14 G/DL (ref 11.7–15.7)
IMM GRANULOCYTES # BLD: 0 10E3/UL
IMM GRANULOCYTES NFR BLD: 0 %
LYMPHOCYTES # BLD AUTO: 1.7 10E3/UL (ref 0.8–5.3)
LYMPHOCYTES NFR BLD AUTO: 28 %
MCH RBC QN AUTO: 27.8 PG (ref 26.5–33)
MCHC RBC AUTO-ENTMCNC: 31.5 G/DL (ref 31.5–36.5)
MCV RBC AUTO: 88 FL (ref 78–100)
MONOCYTES # BLD AUTO: 0.6 10E3/UL (ref 0–1.3)
MONOCYTES NFR BLD AUTO: 9 %
NEUTROPHILS # BLD AUTO: 3.5 10E3/UL (ref 1.6–8.3)
NEUTROPHILS NFR BLD AUTO: 58 %
NRBC # BLD AUTO: 0 10E3/UL
NRBC BLD AUTO-RTO: 0 /100
PLATELET # BLD AUTO: 238 10E3/UL (ref 150–450)
POTASSIUM SERPL-SCNC: 4.5 MMOL/L (ref 3.4–5.3)
PROT SERPL-MCNC: 7.1 G/DL (ref 6.4–8.3)
RBC # BLD AUTO: 5.04 10E6/UL (ref 3.8–5.2)
SODIUM SERPL-SCNC: 143 MMOL/L (ref 135–145)
WBC # BLD AUTO: 6.1 10E3/UL (ref 4–11)

## 2024-01-10 PROCEDURE — 80053 COMPREHEN METABOLIC PANEL: CPT

## 2024-01-10 PROCEDURE — 36415 COLL VENOUS BLD VENIPUNCTURE: CPT

## 2024-01-10 PROCEDURE — 85025 COMPLETE CBC W/AUTO DIFF WBC: CPT

## 2024-01-10 PROCEDURE — 99215 OFFICE O/P EST HI 40 MIN: CPT | Performed by: INTERNAL MEDICINE

## 2024-01-10 PROCEDURE — G0463 HOSPITAL OUTPT CLINIC VISIT: HCPCS | Performed by: INTERNAL MEDICINE

## 2024-01-10 ASSESSMENT — PAIN SCALES - GENERAL: PAINLEVEL: NO PAIN (0)

## 2024-01-10 NOTE — LETTER
"    1/10/2024         RE: Cordelia Thorne  [address] [address]  [facility] [address]        Dear Colleague,    Thank you for referring your patient, Cordelia Thorne, to the [facility] CANCER CENTER [place]. Please see a copy of my visit note below.    Oncology Rooming Note    January 10, 2024 10:30 AM   Cordelia Thorne is a 78 year old female who presents for:    Chief Complaint   Patient presents with     Oncology Clinic Visit     Invasive ductal carcinoma of breast, stage 1, right (H)     Initial Vitals: BP (!) 143/64   Pulse 63   Temp 97.8  F (36.6  C)   Resp 18   Wt 74 kg (163 lb 1.6 oz)   SpO2 96%   BMI 28.00 kg/m   Estimated body mass index is 28 kg/m  as calculated from the following:    Height as of 11/8/23: 1.626 m (5' 4\").    Weight as of this encounter: 74 kg (163 lb 1.6 oz). Body surface area is 1.83 meters squared.  No Pain (0) Comment: Data Unavailable   No LMP recorded. Patient is postmenopausal.  Allergies reviewed: Yes  Medications reviewed: Yes    Medications: MEDICATION REFILLS NEEDED TODAY. Provider was notified.  Pharmacy name entered into [employer]: [facility] #[id number] [facility] [address] AT [facility] OF [facility]    Frailty Screening:   Is the patient here for a new oncology consult visit in cancer care? 2. No      Clinical concerns: Has a questions written down. Thinks her letrozole is causing wrinkles under her eyes.       Pat Shetty LPN               [facility] Hematology and Oncology Progress Note    Patient: Cordelia Thorne  MRN: [id number]  1/10/24        Reason for Visit    Chief Complaint   Patient presents with     Oncology Clinic Visit     Invasive ductal carcinoma of breast, stage 1, right (H)         Problem List Items Addressed This Visit          Other    Invasive ductal carcinoma of breast, stage 1, right (H) - Primary             Assessment and Plan  Early right-sided hormone receptor positive and HR negative breast cancer, status " postlumpectomy and adjuvant radiation  T2N0.  Pathologic prognostic stage Ia.  Oncotype DX 23.  Started adjuvant letrozole in July-August 2023.  Clinically well on it.  No significant side effects.  She again had multiple questions regarding her risk of breast cancer recurrence and other external factors that could influence this recurrence risk.  I answered her questions in detail as much as possible to my ability.  She is worried about wrinkling underneath her eyes and thinks this is a side effect of letrozole.  Again I explained to her that is not the most common side effect.  Clinical exam today was unremarkable.  She does have mild lymphedema in the right breast.  She has PT scheduled in the near future.  Again we discussed about the role of Zometa and other bisphosphonate in the setting of osteoporosis and long-term use of an aromatase inhibitor.  She is still not 100% on board with this.  She wants to continue calcium and vitamin D supplementation.  She is drinking a lot of milk and cheese and other calcium containing foods.  Her calcium level today was mildly up at 10.3.  Otherwise normal CBC.  Serum chemistry shows mildly elevated glucose.  Recommended checking with her PCP regarding evaluation for diabetes.    She has a mammogram scheduled in February.  I will see her back in 4 months.     Cancer Staging   Invasive ductal carcinoma of breast, stage 1, right (H)  Staging form: Breast, AJCC 8th Edition  - Pathologic: Stage IA (pT2, pN0, cM0, G2, ER+, AL+, HER2-) - Signed by Cheyenne Jaimes MD on 4/12/2023      ECOG Performance    1 - Can't do physically strenuous work, but fully ambyulatory and can do light sedentary work         Problem List    Patient Active Problem List   Diagnosis     Vertigo     Other specified phobia     Invasive ductal carcinoma of breast, stage 1, right (H)            Interval History   Cordelia CABALLERO Parent is a 77 year old female with early-stage right-sided hormone receptor positive  and HER2 negative breast cancer status postlumpectomy and adjuvant radiation who is currently on endocrine therapy with letrozole is here for follow-up.    Has been on letrozole for a few months now.  Doing relatively well.  No significant side effects.  She does complain of some wrinkling underneath her eyes which she thinks is from letrozole.  No significant musculoskeletal pain.  No significant hot flashes or night sweats.  She has osteoporosis and is on oral calcium and vitamin D supplementation.  Previously has declined Zometa.  Denies any new breast symptoms.      Review of Systems  A comprehensive review of systems was negative except for what is noted in the interval history    Current Outpatient Medications   Medication     Ascorbic Acid (VITAMIN C) 500 MG CAPS     Calcium Carbonate-Vitamin D (CALCIUM-VITAMIN D3) 600-3.125 MG-MCG TABS     CALCIUM CITRATE PO     Lactobacillus rhamnosus GG (CULTURELLE) 15 billion cell CpSP     letrozole (FEMARA) 2.5 MG tablet     MAGNESIUM GLYCINATE PO     Omega-3 Fatty Acids (FISH OIL) 1200 MG capsule     Vitamin D (Cholecalciferol) 50 MCG (2000 UT) CAPS     No current facility-administered medications for this visit.        Physical Exam        General: alert and cooperative  Breast status post left lumpectomy.  Scar healed well.  No new masses noted in either breast.  Mild lymphedema on the right breast.  No evidence of bilateral axillary adenopathy.:   CNS: Alert and oriented x3, neurologic exam grossly normal.    Lab Results    Recent Results (from the past 168 hour(s))   Comprehensive metabolic panel (BMP + Alb, Alk Phos, ALT, AST, Total. Bili, TP)   Result Value Ref Range    Sodium 143 135 - 145 mmol/L    Potassium 4.5 3.4 - 5.3 mmol/L    Carbon Dioxide (CO2) 29 22 - 29 mmol/L    Anion Gap 10 7 - 15 mmol/L    Urea Nitrogen 23.9 (H) 8.0 - 23.0 mg/dL    Creatinine 0.82 0.51 - 0.95 mg/dL    GFR Estimate 74 >60 mL/min/1.73m2    Calcium 10.3 (H) 8.8 - 10.2 mg/dL    Chloride  104 98 - 107 mmol/L    Glucose 116 (H) 70 - 99 mg/dL    Alkaline Phosphatase 93 40 - 150 U/L    AST 26 0 - 45 U/L    ALT 33 0 - 50 U/L    Protein Total 7.1 6.4 - 8.3 g/dL    Albumin 4.6 3.5 - 5.2 g/dL    Bilirubin Total 0.4 <=1.2 mg/dL   CBC with platelets and differential   Result Value Ref Range    WBC Count 6.1 4.0 - 11.0 10e3/uL    RBC Count 5.04 3.80 - 5.20 10e6/uL    Hemoglobin 14.0 11.7 - 15.7 g/dL    Hematocrit 44.4 35.0 - 47.0 %    MCV 88 78 - 100 fL    MCH 27.8 26.5 - 33.0 pg    MCHC 31.5 31.5 - 36.5 g/dL    RDW 13.1 10.0 - 15.0 %    Platelet Count 238 150 - 450 10e3/uL    % Neutrophils 58 %    % Lymphocytes 28 %    % Monocytes 9 %    % Eosinophils 4 %    % Basophils 1 %    % Immature Granulocytes 0 %    NRBCs per 100 WBC 0 <1 /100    Absolute Neutrophils 3.5 1.6 - 8.3 10e3/uL    Absolute Lymphocytes 1.7 0.8 - 5.3 10e3/uL    Absolute Monocytes 0.6 0.0 - 1.3 10e3/uL    Absolute Eosinophils 0.2 0.0 - 0.7 10e3/uL    Absolute Basophils 0.1 0.0 - 0.2 10e3/uL    Absolute Immature Granulocytes 0.0 <=0.4 10e3/uL    Absolute NRBCs 0.0 10e3/uL         Imaging    No results found.    A total of 45 min were spent today on this visit which included face to face conversation with the patient, EMR review, counseling and co-ordination of care and medical documentation.        Signed by: Cory Carrasco MD      Again, thank you for allowing me to participate in the care of your patient.        Sincerely,        Cory Carrasco MD

## 2024-01-10 NOTE — PROGRESS NOTES
"Oncology Rooming Note    January 10, 2024 10:30 AM   Cordelia Thorne is a 78 year old female who presents for:    Chief Complaint   Patient presents with    Oncology Clinic Visit     Invasive ductal carcinoma of breast, stage 1, right (H)     Initial Vitals: BP (!) 143/64   Pulse 63   Temp 97.8  F (36.6  C)   Resp 18   Wt 74 kg (163 lb 1.6 oz)   SpO2 96%   BMI 28.00 kg/m   Estimated body mass index is 28 kg/m  as calculated from the following:    Height as of 11/8/23: 1.626 m (5' 4\").    Weight as of this encounter: 74 kg (163 lb 1.6 oz). Body surface area is 1.83 meters squared.  No Pain (0) Comment: Data Unavailable   No LMP recorded. Patient is postmenopausal.  Allergies reviewed: Yes  Medications reviewed: Yes    Medications: MEDICATION REFILLS NEEDED TODAY. Provider was notified.  Pharmacy name entered into [employer]: [id number] [address] AT [facility] OF [facility]    Frailty Screening:   Is the patient here for a new oncology consult visit in cancer care? 2. No      Clinical concerns: Has a questions written down. Thinks her letrozole is causing wrinkles under her eyes.       Pat Shetty LPN             "

## 2024-01-10 NOTE — PROGRESS NOTES
[facility] Hematology and Oncology Progress Note    Patient: Cordelia Thorne  MRN: [id number]  1/10/24        Reason for Visit    Chief Complaint   Patient presents with    Oncology Clinic Visit     Invasive ductal carcinoma of breast, stage 1, right (H)         Problem List Items Addressed This Visit          Other    Invasive ductal carcinoma of breast, stage 1, right (H) - Primary             Assessment and Plan  Early right-sided hormone receptor positive and HR negative breast cancer, status postlumpectomy and adjuvant radiation  T2N0.  Pathologic prognostic stage Ia.  Oncotype DX 23.  Started adjuvant letrozole in July-August 2023.  Clinically well on it.  No significant side effects.  She again had multiple questions regarding her risk of breast cancer recurrence and other external factors that could influence this recurrence risk.  I answered her questions in detail as much as possible to my ability.  She is worried about wrinkling underneath her eyes and thinks this is a side effect of letrozole.  Again I explained to her that is not the most common side effect.  Clinical exam today was unremarkable.  She does have mild lymphedema in the right breast.  She has PT scheduled in the near future.  Again we discussed about the role of Zometa and other bisphosphonate in the setting of osteoporosis and long-term use of an aromatase inhibitor.  She is still not 100% on board with this.  She wants to continue calcium and vitamin D supplementation.  She is drinking a lot of milk and cheese and other calcium containing foods.  Her calcium level today was mildly up at 10.3.  Otherwise normal CBC.  Serum chemistry shows mildly elevated glucose.  Recommended checking with her PCP regarding evaluation for diabetes.    She has a mammogram scheduled in February.  I will see her back in 4 months.     Cancer Staging   Invasive ductal carcinoma of breast, stage 1, right (H)  Staging form: Breast, AJCC 8th Edition  -  Pathologic: Stage IA (pT2, pN0, cM0, G2, ER+, RI+, HER2-) - Signed by Cheyenne Jaimes MD on 4/12/2023      ECOG Performance    1 - Can't do physically strenuous work, but fully ambyulatory and can do light sedentary work         Problem List    Patient Active Problem List   Diagnosis    Vertigo    Other specified phobia    Invasive ductal carcinoma of breast, stage 1, right (H)            Interval History   Cordelia CABALLERO Parent is a 77 year old female with early-stage right-sided hormone receptor positive and HER2 negative breast cancer status postlumpectomy and adjuvant radiation who is currently on endocrine therapy with letrozole is here for follow-up.    Has been on letrozole for a few months now.  Doing relatively well.  No significant side effects.  She does complain of some wrinkling underneath her eyes which she thinks is from letrozole.  No significant musculoskeletal pain.  No significant hot flashes or night sweats.  She has osteoporosis and is on oral calcium and vitamin D supplementation.  Previously has declined Zometa.  Denies any new breast symptoms.      Review of Systems  A comprehensive review of systems was negative except for what is noted in the interval history    Current Outpatient Medications   Medication    Ascorbic Acid (VITAMIN C) 500 MG CAPS    Calcium Carbonate-Vitamin D (CALCIUM-VITAMIN D3) 600-3.125 MG-MCG TABS    CALCIUM CITRATE PO    Lactobacillus rhamnosus GG (CULTURELLE) 15 billion cell CpSP    letrozole (FEMARA) 2.5 MG tablet    MAGNESIUM GLYCINATE PO    Omega-3 Fatty Acids (FISH OIL) 1200 MG capsule    Vitamin D (Cholecalciferol) 50 MCG (2000 UT) CAPS     No current facility-administered medications for this visit.        Physical Exam        General: alert and cooperative  Breast status post left lumpectomy.  Scar healed well.  No new masses noted in either breast.  Mild lymphedema on the right breast.  No evidence of bilateral axillary adenopathy.:   CNS: Alert and oriented x3,  neurologic exam grossly normal.    Lab Results    Recent Results (from the past 168 hour(s))   Comprehensive metabolic panel (BMP + Alb, Alk Phos, ALT, AST, Total. Bili, TP)   Result Value Ref Range    Sodium 143 135 - 145 mmol/L    Potassium 4.5 3.4 - 5.3 mmol/L    Carbon Dioxide (CO2) 29 22 - 29 mmol/L    Anion Gap 10 7 - 15 mmol/L    Urea Nitrogen 23.9 (H) 8.0 - 23.0 mg/dL    Creatinine 0.82 0.51 - 0.95 mg/dL    GFR Estimate 74 >60 mL/min/1.73m2    Calcium 10.3 (H) 8.8 - 10.2 mg/dL    Chloride 104 98 - 107 mmol/L    Glucose 116 (H) 70 - 99 mg/dL    Alkaline Phosphatase 93 40 - 150 U/L    AST 26 0 - 45 U/L    ALT 33 0 - 50 U/L    Protein Total 7.1 6.4 - 8.3 g/dL    Albumin 4.6 3.5 - 5.2 g/dL    Bilirubin Total 0.4 <=1.2 mg/dL   CBC with platelets and differential   Result Value Ref Range    WBC Count 6.1 4.0 - 11.0 10e3/uL    RBC Count 5.04 3.80 - 5.20 10e6/uL    Hemoglobin 14.0 11.7 - 15.7 g/dL    Hematocrit 44.4 35.0 - 47.0 %    MCV 88 78 - 100 fL    MCH 27.8 26.5 - 33.0 pg    MCHC 31.5 31.5 - 36.5 g/dL    RDW 13.1 10.0 - 15.0 %    Platelet Count 238 150 - 450 10e3/uL    % Neutrophils 58 %    % Lymphocytes 28 %    % Monocytes 9 %    % Eosinophils 4 %    % Basophils 1 %    % Immature Granulocytes 0 %    NRBCs per 100 WBC 0 <1 /100    Absolute Neutrophils 3.5 1.6 - 8.3 10e3/uL    Absolute Lymphocytes 1.7 0.8 - 5.3 10e3/uL    Absolute Monocytes 0.6 0.0 - 1.3 10e3/uL    Absolute Eosinophils 0.2 0.0 - 0.7 10e3/uL    Absolute Basophils 0.1 0.0 - 0.2 10e3/uL    Absolute Immature Granulocytes 0.0 <=0.4 10e3/uL    Absolute NRBCs 0.0 10e3/uL         Imaging    No results found.    A total of 45 min were spent today on this visit which included face to face conversation with the patient, EMR review, counseling and co-ordination of care and medical documentation.        Signed by: Cory Carrasco MD

## 2024-01-11 ENCOUNTER — PATIENT OUTREACH (OUTPATIENT)
Dept: ONCOLOGY | Facility: HOSPITAL | Age: 79
End: 2024-01-11
Payer: COMMERCIAL

## 2024-01-11 NOTE — PROGRESS NOTES
[facility]: Cancer Care Follow-Up Note                                    Discussion with Patient:                                                      Pt calls in to RN Cancer Care Coordinator with follow up questions from her appointment with Dr. Carrasco yesterday.        Medication understanding/side effect: More questions about her high calcium level, and whether that may cause her to get breast cancer again. Detailed questions about her diet and how to reduce calcium in the diet. Dr. Carrasco discussed this with her yesterday too. Told her, as he did, that yoghurt is ok, but reduce milk and cheese.    Education concerns: She has several questions about lymphedema and whether it can cause cancer - based on something she heard Dr. Jaimes say.   She wants writer to ask Dr. Carrasco if she can have her labs checked again when she comes back to clinic, to check on her calcium level.    Writer told her I will discuss all these things with Dr. Carrasco and get back to her -- either tomorrow OR next week.         Dates of Treatment:                                                      Infusion given in last 28 days       None            Assessment:                                                      Anxious with many repetitive questions about getting cancer again. Also very appreciative for the care she is provided here.    Intervention/Education provided during outreach:                                                       As above.    Follow up call in 1 - 5 days    Signature:  Shari Boudreaux RN

## 2024-01-13 DIAGNOSIS — C50.911 INVASIVE DUCTAL CARCINOMA OF BREAST, STAGE 1, RIGHT (H): ICD-10-CM

## 2024-01-15 RX ORDER — LETROZOLE 2.5 MG/1
2.5 TABLET, FILM COATED ORAL DAILY
Qty: 90 TABLET | Refills: 0 | Status: SHIPPED | OUTPATIENT
Start: 2024-01-15 | End: 2024-04-12

## 2024-01-18 ENCOUNTER — THERAPY VISIT (OUTPATIENT)
Dept: PHYSICAL THERAPY | Facility: REHABILITATION | Age: 79
End: 2024-01-18
Attending: STUDENT IN AN ORGANIZED HEALTH CARE EDUCATION/TRAINING PROGRAM
Payer: COMMERCIAL

## 2024-01-18 DIAGNOSIS — C50.911 INVASIVE DUCTAL CARCINOMA OF BREAST, STAGE 1, RIGHT (H): ICD-10-CM

## 2024-01-18 DIAGNOSIS — I89.0 LYMPHEDEMA: Primary | ICD-10-CM

## 2024-01-18 PROCEDURE — 97140 MANUAL THERAPY 1/> REGIONS: CPT | Mod: GP | Performed by: PHYSICAL THERAPIST

## 2024-01-18 PROCEDURE — 97161 PT EVAL LOW COMPLEX 20 MIN: CPT | Mod: GP | Performed by: PHYSICAL THERAPIST

## 2024-01-18 PROCEDURE — 97110 THERAPEUTIC EXERCISES: CPT | Mod: GP | Performed by: PHYSICAL THERAPIST

## 2024-01-31 ENCOUNTER — PATIENT OUTREACH (OUTPATIENT)
Dept: ONCOLOGY | Facility: HOSPITAL | Age: 79
End: 2024-01-31
Payer: COMMERCIAL

## 2024-01-31 DIAGNOSIS — M81.0 AGE-RELATED OSTEOPOROSIS WITHOUT CURRENT PATHOLOGICAL FRACTURE: ICD-10-CM

## 2024-01-31 DIAGNOSIS — C50.911 INVASIVE DUCTAL CARCINOMA OF BREAST, STAGE 1, RIGHT (H): Primary | ICD-10-CM

## 2024-01-31 NOTE — PROGRESS NOTES
[facility]: Cancer Care                                                                                      RN Cancer Care Coordinator spoke with patient on the phone to go over some of her questions from our conversation a couple of weeks ago.     She continues to ask about her (very slightly) high calcium level, and what dietary changes she should make. She states she has stopped drinking milk, and has started eating yogurt, but still is asking about how much cheese and cottage cheese she can have. Writer eventually told her to eat half or less of the amount she had been eating before. Explained again that   Her calcium level was 10.3, where the high of normal is 10.2.      She asked again about lymph fluid and laying on her side - and she says that Dr. Jaimes told her lymph fluid can cause cancer. Writer let her know that when this was previously brought up to Dr. Carrasco he seemed to know what she was referring to, and that it did not apply to Marina, however writer cannot explain this. She is insistent that Dr. Jaimes told her that lymph fluid can cause cancer. She states that her new lymphatic PT also was not aware of this. Writer referred her back to Dr. Jaimes to clarify.     She doesn't like to get labs done at her [facility] clinic due to the practice she observes of their staff not wearing gloves.     Writer made a lab only appt for her in 2 months in mid March to recheck calcium. Let her know that it may take some time for dietary changes to make a difference in the blood level. She would like her glucose checked at the same time, and writer let her know this would be in the same panel.       Signature:  Shari Boudreaux RN

## 2024-02-07 ENCOUNTER — PATIENT OUTREACH (OUTPATIENT)
Dept: ONCOLOGY | Facility: HOSPITAL | Age: 79
End: 2024-02-07
Payer: COMMERCIAL

## 2024-02-07 NOTE — PROGRESS NOTES
[facility]: Cancer Care                                                                                      RN Cancer Care Coordinator received call from patient with questions about drinking water. She has switched to drinking water from glass bottles, but is concerned about something she has seen on that company's website about the analysis regarding radiation. She is asking if Dr. Carrasco would look at this to interpret whether this water is safe for her.     Writer notes she does not have [employer], and give permission to email the information to writer. Warned her that it would be at least 2 weeks before writer can discuss with Uzma Carrasco as he is out of town until mid next week and then quite busy in catching up from his time away.    She wonders aloud about asking Dr. Jaimes, radiation oncologist, or Montana Myers, pharmacist. She states she will try to ask Montana Myers.    Signature:  Shari Boudreaux RN

## 2024-02-09 NOTE — PROGRESS NOTES
History:  Cordelia Thorne presents for one year follow-up of breast cancer.  She is s/p right lumpectomy with SLN biopsy in March 2023.  This was followed by radiation and initiation of endocrine therapy.  She is currently on letrozole.  She is doing well.  She is happy she is not having many side effects from the letrozole.  She is a little concerned about how she can see more of her wrinkles on her face.  She also developed lymphedema to her breast following radiation.  She has been doing massage and exercises and does help.    Physical exam:  BREAST: Right breast 12:00 incision has healed well.  There is scar tissue deep to this but no palpable masses or abnormalities.  The right breast is quite a bit smaller compared to the left.    Imaging:  Pertinent images personally reviewed by myself and discussed with the patient.  Radiology report still pending:  Right breast is quite a bit smaller now.  There breast conservation changes with surgical clip in the old lumpectomy cavity.    ASSESSMENT:  Cordelia Thorne is s/p right lumpectomy for invasive ductal carcinoma    - Grade II, T2, N0, ER/VT+, HER2-  --> pathologic prognostic stage IA    PLAN:  Most recent imaging reviewed by myself and shown to the patient  Perform monthly self exams  Continue with yearly mammograms  Return to the breast clinic as needed    40 minutes was spent in chart prep, discussion, and documentation.    Xenia Garcia DO  General Surgeon  [facility]  Breast [place] [facility] [place] [facility]  [address] [place] [id number]  Office: [phone]  Employed by - [facility]

## 2024-02-12 ENCOUNTER — OFFICE VISIT (OUTPATIENT)
Dept: SURGERY | Facility: CLINIC | Age: 79
End: 2024-02-12
Attending: FAMILY MEDICINE
Payer: COMMERCIAL

## 2024-02-12 ENCOUNTER — ANCILLARY PROCEDURE (OUTPATIENT)
Dept: MAMMOGRAPHY | Facility: CLINIC | Age: 79
End: 2024-02-12
Attending: SURGERY
Payer: COMMERCIAL

## 2024-02-12 DIAGNOSIS — Z12.31 ENCOUNTER FOR SCREENING MAMMOGRAM FOR BREAST CANCER: ICD-10-CM

## 2024-02-12 DIAGNOSIS — Z85.3 HX: BREAST CANCER: Primary | ICD-10-CM

## 2024-02-12 PROCEDURE — G0463 HOSPITAL OUTPT CLINIC VISIT: HCPCS | Performed by: SURGERY

## 2024-02-12 PROCEDURE — 99215 OFFICE O/P EST HI 40 MIN: CPT | Performed by: SURGERY

## 2024-02-12 PROCEDURE — 77063 BREAST TOMOSYNTHESIS BI: CPT

## 2024-02-12 NOTE — NURSING NOTE
Marina presents to [facility] today for a follow up appointment with Dr. Garcia .Patient notes no new breast concerns.  Patient had mammogram done today, see report for details.  She is following with Dr. Carrasco  for medical oncology and is taking endocrine therapy, tolerating it well.  RN assessment and EMRupdate.  Patient met with Dr. Garcia .  See dictation for details of visit and follow up plan.  Support provided, invited calls.

## 2024-02-14 ENCOUNTER — TELEPHONE (OUTPATIENT)
Dept: SURGERY | Facility: CLINIC | Age: 79
End: 2024-02-14
Payer: COMMERCIAL

## 2024-02-14 NOTE — TELEPHONE ENCOUNTER
Marina called, wanted to review some of the information gathered at her appointment with Dr. Garcia on 2-12-24.  Reviewed information with her to the best of my ability.  Support provided, invited calls.

## 2024-03-06 ENCOUNTER — THERAPY VISIT (OUTPATIENT)
Dept: PHYSICAL THERAPY | Facility: REHABILITATION | Age: 79
End: 2024-03-06
Payer: COMMERCIAL

## 2024-03-06 DIAGNOSIS — I89.0 LYMPHEDEMA: ICD-10-CM

## 2024-03-06 DIAGNOSIS — C50.911 INVASIVE DUCTAL CARCINOMA OF BREAST, STAGE 1, RIGHT (H): Primary | ICD-10-CM

## 2024-03-06 PROCEDURE — 97140 MANUAL THERAPY 1/> REGIONS: CPT | Mod: GP | Performed by: PHYSICAL THERAPIST

## 2024-03-06 PROCEDURE — 97110 THERAPEUTIC EXERCISES: CPT | Mod: GP | Performed by: PHYSICAL THERAPIST

## 2024-03-07 ENCOUNTER — TELEPHONE (OUTPATIENT)
Dept: ONCOLOGY | Facility: HOSPITAL | Age: 79
End: 2024-03-07
Payer: COMMERCIAL

## 2024-03-07 NOTE — TELEPHONE ENCOUNTER
I received a voice message from Marina.  She is calling because she has blood work next week and she met with her PT recently who told her there is a blood test to check for cancer in your bones.  She wanted a call back to discuss this.    Per Dr. Carrasco, there is currently not an approved test for this.  I tried to call this with this information today.  I was unable to reach her.  I left a voice message requesting a call back at her convenience.    Hannah Rosenberg RN on 3/7/2024 at 8:20 AM

## 2024-03-07 NOTE — TELEPHONE ENCOUNTER
Marina called back and I let her know that per Dr. Carrasco there is not a blood test to look for bone metastases.  She states this pain in her low back for started in about June or July.  She does not have any pain with walking or standing but she does start to notice pain when she sits too long.  She said her physical therapist told her she probably has some arthritis in her back.  However, she is very worried that she could have cancer in her bone.  She is requesting that I asked Dr. Carrasco if he feels that some imaging is needed at this time.  I let her know that Dr. Carrasco is not in the clinic today.  She is okay waiting to hear back tomorrow.  She can be reached at [phone].    Hannah Rosenberg RN on 3/7/2024 at 12:50 PM

## 2024-03-07 NOTE — TELEPHONE ENCOUNTER
"Per Dr. Carrasco: \"I think further workup in terms of need for imaging should come from her primary care provider. I do not think this represents metastatic disease.\"    I called Marina back with this information today. She feels relieved hearing that Dr. Carrasco does not feel this is likely due to cancer. She agrees to discuss it with her PCP. She has no other questions or concerns at this time.     Hannah Rosenberg RN on 3/7/2024 at 1:01 PM              "

## 2024-03-12 ENCOUNTER — LAB (OUTPATIENT)
Dept: INFUSION THERAPY | Facility: HOSPITAL | Age: 79
End: 2024-03-12
Attending: STUDENT IN AN ORGANIZED HEALTH CARE EDUCATION/TRAINING PROGRAM
Payer: COMMERCIAL

## 2024-03-12 DIAGNOSIS — M81.0 AGE-RELATED OSTEOPOROSIS WITHOUT CURRENT PATHOLOGICAL FRACTURE: ICD-10-CM

## 2024-03-12 DIAGNOSIS — C50.911 INVASIVE DUCTAL CARCINOMA OF BREAST, STAGE 1, RIGHT (H): ICD-10-CM

## 2024-03-12 LAB
ALBUMIN SERPL BCG-MCNC: 4.2 G/DL (ref 3.5–5.2)
ALP SERPL-CCNC: 82 U/L (ref 40–150)
ALT SERPL W P-5'-P-CCNC: 25 U/L (ref 0–50)
ANION GAP SERPL CALCULATED.3IONS-SCNC: 7 MMOL/L (ref 7–15)
AST SERPL W P-5'-P-CCNC: 20 U/L (ref 0–45)
BASOPHILS # BLD AUTO: 0.1 10E3/UL (ref 0–0.2)
BASOPHILS NFR BLD AUTO: 1 %
BILIRUB SERPL-MCNC: 0.4 MG/DL
BUN SERPL-MCNC: 20 MG/DL (ref 8–23)
CALCIUM SERPL-MCNC: 10 MG/DL (ref 8.8–10.2)
CHLORIDE SERPL-SCNC: 106 MMOL/L (ref 98–107)
CREAT SERPL-MCNC: 0.97 MG/DL (ref 0.51–0.95)
DEPRECATED HCO3 PLAS-SCNC: 30 MMOL/L (ref 22–29)
EGFRCR SERPLBLD CKD-EPI 2021: 60 ML/MIN/1.73M2
EOSINOPHIL # BLD AUTO: 0.2 10E3/UL (ref 0–0.7)
EOSINOPHIL NFR BLD AUTO: 4 %
ERYTHROCYTE [DISTWIDTH] IN BLOOD BY AUTOMATED COUNT: 13.5 % (ref 10–15)
GLUCOSE SERPL-MCNC: 110 MG/DL (ref 70–99)
HCT VFR BLD AUTO: 43 % (ref 35–47)
HGB BLD-MCNC: 13.3 G/DL (ref 11.7–15.7)
IMM GRANULOCYTES # BLD: 0 10E3/UL
IMM GRANULOCYTES NFR BLD: 0 %
LYMPHOCYTES # BLD AUTO: 1.4 10E3/UL (ref 0.8–5.3)
LYMPHOCYTES NFR BLD AUTO: 28 %
MCH RBC QN AUTO: 27.4 PG (ref 26.5–33)
MCHC RBC AUTO-ENTMCNC: 30.9 G/DL (ref 31.5–36.5)
MCV RBC AUTO: 89 FL (ref 78–100)
MONOCYTES # BLD AUTO: 0.5 10E3/UL (ref 0–1.3)
MONOCYTES NFR BLD AUTO: 10 %
NEUTROPHILS # BLD AUTO: 2.8 10E3/UL (ref 1.6–8.3)
NEUTROPHILS NFR BLD AUTO: 57 %
NRBC # BLD AUTO: 0 10E3/UL
NRBC BLD AUTO-RTO: 0 /100
PLATELET # BLD AUTO: 198 10E3/UL (ref 150–450)
POTASSIUM SERPL-SCNC: 4.7 MMOL/L (ref 3.4–5.3)
PROT SERPL-MCNC: 7.2 G/DL (ref 6.4–8.3)
RBC # BLD AUTO: 4.86 10E6/UL (ref 3.8–5.2)
SODIUM SERPL-SCNC: 143 MMOL/L (ref 135–145)
WBC # BLD AUTO: 5 10E3/UL (ref 4–11)

## 2024-03-12 PROCEDURE — 80053 COMPREHEN METABOLIC PANEL: CPT

## 2024-03-12 PROCEDURE — 85025 COMPLETE CBC W/AUTO DIFF WBC: CPT

## 2024-03-12 PROCEDURE — 36415 COLL VENOUS BLD VENIPUNCTURE: CPT

## 2024-03-14 ENCOUNTER — PATIENT OUTREACH (OUTPATIENT)
Dept: ONCOLOGY | Facility: HOSPITAL | Age: 79
End: 2024-03-14
Payer: COMMERCIAL

## 2024-03-14 NOTE — PROGRESS NOTES
[facility]: Cancer Care Follow-Up Note                                    Discussion with Patient:                                                      RN Cancer Care Coordinator received call from patient to receive her lab results. Writer explained that I went over them with Dr. Carrasco, and he said they look very good.     She had questions about the calcium level which is normal now. She wants to know if this means that the calcium is getting into her bones.   She has questions about the glucose level. It is better than the last time, and writer suggests she speak with her PCP about this. She wants to know what the level would be that would indicate diabetes. Writer tells her that 110 is not a very high glucose, and it would a trend of very high or very low glucose levels that would be concerning.      We went over her return visit. She is getting shingles vaccine #1 tomorrow, and still wants to get the next covid vaccine, but doesn't want to end up with a swollen lymph node. Writer explained if she gets this done in the next week or two it should be fine, but also, if we delay her return for a  month or so, that will be OK too.       Goals          General     Medical (pt-stated)      Notes - Note created  3/14/2024  1:23 PM by Shari Boudreaux, RN     To stay healthy and cancer free.              Dates of Treatment:                                                      Infusion given in last 28 days       None          Assessment:                                                      Questions about labs and follow up care.   Wants to maintain her health.    Intervention/Education provided during outreach:                                                       Conversation and advice per above note.  Recommended to continue to move forward on getting the vaccines and covid booster.    Patient to follow up as scheduled at next appt  Patient to call/[employer] message with updates  Confirmed patient has clinic  and triage numbers    Signature:  Shari Boudreaux RN

## 2024-04-12 DIAGNOSIS — C50.911 INVASIVE DUCTAL CARCINOMA OF BREAST, STAGE 1, RIGHT (H): ICD-10-CM

## 2024-04-12 RX ORDER — LETROZOLE 2.5 MG/1
2.5 TABLET, FILM COATED ORAL DAILY
Qty: 90 TABLET | Refills: 0 | Status: SHIPPED | OUTPATIENT
Start: 2024-04-12 | End: 2024-07-11

## 2024-05-08 ENCOUNTER — ONCOLOGY VISIT (OUTPATIENT)
Dept: ONCOLOGY | Facility: HOSPITAL | Age: 79
End: 2024-05-08
Attending: INTERNAL MEDICINE
Payer: COMMERCIAL

## 2024-05-08 ENCOUNTER — PATIENT OUTREACH (OUTPATIENT)
Dept: ONCOLOGY | Facility: HOSPITAL | Age: 79
End: 2024-05-08
Payer: COMMERCIAL

## 2024-05-08 VITALS
WEIGHT: 158 LBS | DIASTOLIC BLOOD PRESSURE: 65 MMHG | HEART RATE: 63 BPM | HEIGHT: 64 IN | OXYGEN SATURATION: 95 % | RESPIRATION RATE: 18 BRPM | TEMPERATURE: 98.9 F | SYSTOLIC BLOOD PRESSURE: 156 MMHG | BODY MASS INDEX: 26.98 KG/M2

## 2024-05-08 DIAGNOSIS — C50.911 INVASIVE DUCTAL CARCINOMA OF BREAST, STAGE 1, RIGHT (H): Primary | ICD-10-CM

## 2024-05-08 PROCEDURE — G0463 HOSPITAL OUTPT CLINIC VISIT: HCPCS | Performed by: INTERNAL MEDICINE

## 2024-05-08 PROCEDURE — G2211 COMPLEX E/M VISIT ADD ON: HCPCS | Performed by: INTERNAL MEDICINE

## 2024-05-08 PROCEDURE — 99215 OFFICE O/P EST HI 40 MIN: CPT | Performed by: INTERNAL MEDICINE

## 2024-05-08 ASSESSMENT — PAIN SCALES - GENERAL: PAINLEVEL: NO PAIN (0)

## 2024-05-08 NOTE — PROGRESS NOTES
"Oncology Rooming Note    May 8, 2024 12:33 PM   Cordelia Thorne is a 78 year old female who presents for:    Chief Complaint   Patient presents with    Oncology Clinic Visit     4 month follow up related to Invasive ductal carcinoma of breast, stage 1, right     Initial Vitals: BP (!) 156/65 (BP Location: Right arm, Patient Position: Sitting, Cuff Size: Adult Regular)   Pulse 63   Temp 98.9  F (37.2  C) (Tympanic)   Resp 18   Ht 1.626 m (5' 4\")   Wt 71.7 kg (158 lb)   SpO2 95%   BMI 27.12 kg/m   Estimated body mass index is 27.12 kg/m  as calculated from the following:    Height as of this encounter: 1.626 m (5' 4\").    Weight as of this encounter: 71.7 kg (158 lb). Body surface area is 1.8 meters squared.  No Pain (0) Comment: Data Unavailable   No LMP recorded. Patient is postmenopausal.  Allergies reviewed: Yes  Medications reviewed: Yes    Medications: Medication refills not needed today.  Pharmacy name entered into [employer]: [id number] [address] AT [facility]    Frailty Screening:   Is the patient here for a new oncology consult visit in cancer care? 2. No      Clinical concerns: None      CARLOS A LEUNG CMA            "

## 2024-05-08 NOTE — LETTER
"    5/8/2024         RE: Cordelia Thorne  [address] [address]  [facility] [place] [address]        Dear Colleague,    Thank you for referring your patient, Cordelia Thorne, to the [facility]. Please see a copy of my visit note below.    Oncology Rooming Note    May 8, 2024 12:33 PM   Cordelia Thorne is a 78 year old female who presents for:    Chief Complaint   Patient presents with     Oncology Clinic Visit     4 month follow up related to Invasive ductal carcinoma of breast, stage 1, right     Initial Vitals: BP (!) 156/65 (BP Location: Right arm, Patient Position: Sitting, Cuff Size: Adult Regular)   Pulse 63   Temp 98.9  F (37.2  C) (Tympanic)   Resp 18   Ht 1.626 m (5' 4\")   Wt 71.7 kg (158 lb)   SpO2 95%   BMI 27.12 kg/m   Estimated body mass index is 27.12 kg/m  as calculated from the following:    Height as of this encounter: 1.626 m (5' 4\").    Weight as of this encounter: 71.7 kg (158 lb). Body surface area is 1.8 meters squared.  No Pain (0) Comment: Data Unavailable   No LMP recorded. Patient is postmenopausal.  Allergies reviewed: Yes  Medications reviewed: Yes    Medications: Medication refills not needed today.  Pharmacy name entered into [employer]: [facility] #[id number] [address] AT [facility]    Frailty Screening:   Is the patient here for a new oncology consult visit in cancer care? 2. No      Clinical concerns: None      CARLOS A LEUNG CMA              [facility] Hematology and Oncology Progress Note    Patient: Cordelia Thorne  MRN: [id number]  5/08/24        Reason for Visit    Chief Complaint   Patient presents with     Oncology Clinic Visit     4 month follow up related to Invasive ductal carcinoma of breast, stage 1, right         Problem List Items Addressed This Visit          Other    Invasive ductal carcinoma of breast, stage 1, right (H) - Primary               Assessment and Plan  Early right-sided hormone receptor positive " and HR negative breast cancer, status postlumpectomy and adjuvant radiation  T2N0.  Pathologic prognostic stage Ia.  Oncotype DX 23.  Started adjuvant letrozole in July-August 2023.      Doing well on letrozole.  No significant side effects.  She does have some back pain issues and she is extremely worried about cancer coming back.  She was told by her physical therapist that she may have some vertebral issues.  She is convinced that this is malignancy.  The quality of pain and the location not consistent with bony metastasis.  I reassured her.  She has no other signs or symptoms of cancer recurrence.  She also had multiple list of questions mostly regarding her diet and supplements that she can take they could affect her cancer.  I answered all her questions to the best of my ability.    Exam was unremarkable today.  No evidence of any abnormal breast lumps noted.  No evidence of bilateral axillary adenopathy.  Mammogram was unremarkable.    She still wants to hold off on bisphosphonates.    I will see her back in 4 months.     Cancer Staging   Invasive ductal carcinoma of breast, stage 1, right (H)  Staging form: Breast, AJCC 8th Edition  - Pathologic: Stage IA (pT2, pN0, cM0, G2, ER+, DC+, HER2-) - Signed by Cheyenne Jaimes MD on 4/12/2023      ECOG Performance    1 - Can't do physically strenuous work, but fully ambyulatory and can do light sedentary work         Problem List    Patient Active Problem List   Diagnosis     Vertigo     Other specified phobia     Invasive ductal carcinoma of breast, stage 1, right (H)            Interval History   Cordelia CABALLERO Parent is a 77 year old female with early-stage right-sided hormone receptor positive and HER2 negative breast cancer status postlumpectomy and adjuvant radiation who is currently on endocrine therapy with letrozole is here for follow-up.    Doing well on letrozole.  No significant side effects.  Denies any new lumps or bumps.  She does have some back pain  issues.  She is worried about cancer coming back in the back.  No weight loss.  No other bone pain elsewhere.  She is here with a list of questions.      Review of Systems  A comprehensive review of systems was negative except for what is noted in the interval history    Current Outpatient Medications   Medication Sig Dispense Refill     Ascorbic Acid (VITAMIN C) 500 MG CAPS Take 1 capsule by mouth daily       CALCIUM CITRATE PO Take 4 tablets by mouth 2 times daily       Lactobacillus rhamnosus GG (CULTURELLE) 15 billion cell CpSP [LACTOBACILLUS RHAMNOSUS GG (CULTURELLE) 15 BILLION CELL CPSP] Take 1 capsule by mouth daily.       letrozole (FEMARA) 2.5 MG tablet TAKE 1 TABLET(2.5 MG) BY MOUTH DAILY 90 tablet 0     MAGNESIUM GLYCINATE PO Take 1 capsule by mouth daily       Vitamin D (Cholecalciferol) 50 MCG (2000 UT) CAPS Take 2,000 Units by mouth daily Includes soybean oil as well       Calcium Carbonate-Vitamin D (CALCIUM-VITAMIN D3) 600-3.125 MG-MCG TABS Take 1 capsule by mouth 2 times daily (Patient not taking: Reported on 5/8/2024)       No current facility-administered medications for this visit.        Physical Exam    Failed to redirect to the Timeline version of the [employer] SmartLink.    General: alert and cooperative  Breast status post left lumpectomy.  Scar healed well.  No new masses noted in either breast.  Mild lymphedema on the right breast.  No evidence of bilateral axillary adenopathy.:   CNS: Alert and oriented x3, neurologic exam grossly normal.    Lab Results    No results found for this or any previous visit (from the past 168 hour(s)).        Imaging    No results found.    A total of 40 min was spent today on this visit which included face to face conversation with the patient, EMR review, counseling and co-ordination of care and medical documentation.    The longitudinal plan of care for the diagnosis(es)/condition(s) as documented were addressed during this visit. Due to the added complexity  in care, I will continue to support Marina in the subsequent management and with ongoing continuity of care.      Signed by: Cory Carrasco MD      Again, thank you for allowing me to participate in the care of your patient.        Sincerely,        Cory Carrasco MD

## 2024-05-17 NOTE — PROGRESS NOTES
[facility] Hematology and Oncology Progress Note    Patient: Cordelia CABALLERO Parent  MRN: [id number]  5/08/24        Reason for Visit    Chief Complaint   Patient presents with    Oncology Clinic Visit     4 month follow up related to Invasive ductal carcinoma of breast, stage 1, right         Problem List Items Addressed This Visit          Other    Invasive ductal carcinoma of breast, stage 1, right (H) - Primary               Assessment and Plan  Early right-sided hormone receptor positive and HR negative breast cancer, status postlumpectomy and adjuvant radiation  T2N0.  Pathologic prognostic stage Ia.  Oncotype DX 23.  Started adjuvant letrozole in July-August 2023.      Doing well on letrozole.  No significant side effects.  She does have some back pain issues and she is extremely worried about cancer coming back.  She was told by her physical therapist that she may have some vertebral issues.  She is convinced that this is malignancy.  The quality of pain and the location not consistent with bony metastasis.  I reassured her.  She has no other signs or symptoms of cancer recurrence.  She also had multiple list of questions mostly regarding her diet and supplements that she can take they could affect her cancer.  I answered all her questions to the best of my ability.    Exam was unremarkable today.  No evidence of any abnormal breast lumps noted.  No evidence of bilateral axillary adenopathy.  Mammogram was unremarkable.    She still wants to hold off on bisphosphonates.    I will see her back in 4 months.     Cancer Staging   Invasive ductal carcinoma of breast, stage 1, right (H)  Staging form: Breast, AJCC 8th Edition  - Pathologic: Stage IA (pT2, pN0, cM0, G2, ER+, NC+, HER2-) - Signed by Cheyenne Jaimes MD on 4/12/2023      ECOG Performance    1 - Can't do physically strenuous work, but fully ambyulatory and can do light sedentary work         Problem List    Patient Active Problem List   Diagnosis     Vertigo    Other specified phobia    Invasive ductal carcinoma of breast, stage 1, right (H)            Interval History   Cordelia Thorne is a 77 year old female with early-stage right-sided hormone receptor positive and HER2 negative breast cancer status postlumpectomy and adjuvant radiation who is currently on endocrine therapy with letrozole is here for follow-up.    Doing well on letrozole.  No significant side effects.  Denies any new lumps or bumps.  She does have some back pain issues.  She is worried about cancer coming back in the back.  No weight loss.  No other bone pain elsewhere.  She is here with a list of questions.      Review of Systems  A comprehensive review of systems was negative except for what is noted in the interval history    Current Outpatient Medications   Medication Sig Dispense Refill    Ascorbic Acid (VITAMIN C) 500 MG CAPS Take 1 capsule by mouth daily      CALCIUM CITRATE PO Take 4 tablets by mouth 2 times daily      Lactobacillus rhamnosus GG (CULTURELLE) 15 billion cell CpSP [LACTOBACILLUS RHAMNOSUS GG (CULTURELLE) 15 BILLION CELL CPSP] Take 1 capsule by mouth daily.      letrozole (FEMARA) 2.5 MG tablet TAKE 1 TABLET(2.5 MG) BY MOUTH DAILY 90 tablet 0    MAGNESIUM GLYCINATE PO Take 1 capsule by mouth daily      Vitamin D (Cholecalciferol) 50 MCG (2000 UT) CAPS Take 2,000 Units by mouth daily Includes soybean oil as well      Calcium Carbonate-Vitamin D (CALCIUM-VITAMIN D3) 600-3.125 MG-MCG TABS Take 1 capsule by mouth 2 times daily (Patient not taking: Reported on 5/8/2024)       No current facility-administered medications for this visit.        Physical Exam    Failed to redirect to the Timeline version of the [facility] SmartLink.    General: alert and cooperative  Breast status post left lumpectomy.  Scar healed well.  No new masses noted in either breast.  Mild lymphedema on the right breast.  No evidence of bilateral axillary adenopathy.:   CNS: Alert and oriented x3, neurologic  exam grossly normal.    Lab Results    No results found for this or any previous visit (from the past 168 hour(s)).        Imaging    No results found.    A total of 40 min was spent today on this visit which included face to face conversation with the patient, EMR review, counseling and co-ordination of care and medical documentation.    The longitudinal plan of care for the diagnosis(es)/condition(s) as documented were addressed during this visit. Due to the added complexity in care, I will continue to support Marina in the subsequent management and with ongoing continuity of care.      Signed by: Cory Carrasco MD

## 2024-05-22 ENCOUNTER — TELEPHONE (OUTPATIENT)
Dept: RADIATION ONCOLOGY | Facility: HOSPITAL | Age: 79
End: 2024-05-22
Payer: COMMERCIAL

## 2024-05-22 ENCOUNTER — TELEPHONE (OUTPATIENT)
Dept: ONCOLOGY | Facility: HOSPITAL | Age: 79
End: 2024-05-22
Payer: COMMERCIAL

## 2024-05-22 NOTE — TELEPHONE ENCOUNTER
I received a phone call from Marina.  She is calling to see if Dr. Carrasco has any recommendations for sunscreen products to use on her face, lips, and skin.  She states Dr. Bello recommended she look at a website to search for [facility] approved products. Dr. Carrasco states he does not have any professional recommendations on certain brands. I called Marina back with this information today. She verbalized understanding and has no future questions.     Hannah Rosenberg RN on 5/22/2024 at 3:33 PM

## 2024-05-22 NOTE — TELEPHONE ENCOUNTER
Pt called with many questions about ingredients in sunscreen for body, face, lips. She was questioning sorbitol, stevia, vitamin E, sunflower oil, Sunflower sprout extract and wax. She, in fact, wanted to talk to Dr. Jaimes over the phone.  I told her to write her specific questions down and bring them with to her follow up appointment or mail us a letter and we can look at it ahead of time. She plans to send a letter. I thanked her and let her know we were very busy today, so it'd be better to have her direct questions in writing. She understood.     Tia Hackett, RN, MAN, OCN  Radiation Oncology [place]

## 2024-07-10 ENCOUNTER — OFFICE VISIT (OUTPATIENT)
Dept: RADIATION ONCOLOGY | Facility: HOSPITAL | Age: 79
End: 2024-07-10
Attending: STUDENT IN AN ORGANIZED HEALTH CARE EDUCATION/TRAINING PROGRAM
Payer: COMMERCIAL

## 2024-07-10 VITALS
SYSTOLIC BLOOD PRESSURE: 147 MMHG | HEART RATE: 64 BPM | OXYGEN SATURATION: 95 % | RESPIRATION RATE: 20 BRPM | WEIGHT: 156.9 LBS | DIASTOLIC BLOOD PRESSURE: 69 MMHG | BODY MASS INDEX: 26.93 KG/M2

## 2024-07-10 DIAGNOSIS — C50.911 INVASIVE DUCTAL CARCINOMA OF BREAST, STAGE 1, RIGHT (H): Primary | ICD-10-CM

## 2024-07-10 PROCEDURE — G0463 HOSPITAL OUTPT CLINIC VISIT: HCPCS | Performed by: STUDENT IN AN ORGANIZED HEALTH CARE EDUCATION/TRAINING PROGRAM

## 2024-07-10 PROCEDURE — 99215 OFFICE O/P EST HI 40 MIN: CPT | Performed by: STUDENT IN AN ORGANIZED HEALTH CARE EDUCATION/TRAINING PROGRAM

## 2024-07-10 ASSESSMENT — PAIN SCALES - GENERAL: PAINLEVEL: NO PAIN (0)

## 2024-07-10 NOTE — PROGRESS NOTES
[facility] Radiation Oncology Follow Up     Patient: Cordelia Thorne  MRN: [id number]  Date of Service: 07/10/2024       DISEASE TREATED:   77-year-old postmenopausal woman with Stage pT2, pN0 invasive ductal carcinoma of the right breast, grade 2, ER/IN positive, HER2/davon negative status post lumpectomy and sentinel lymph node biopsy with negative margins.  Oncotype DX: 23.       TYPE OF RADIATION THERAPY ADMINISTERED:    SITE TREATED: Right breast  TOTAL DOSE: 2600 cGy  NUMBER OF FRACTIONS: 5 fractions  DATES COMPLETED: 5/3/2023 - 5/9/2023  CONCURRENT CHEMOTHERAPY: No  ADJUVANT THERAPY:Yes     INTERVAL SINCE COMPLETION OF RADIATION THERAPY: ~1 yr      SUBJECTIVE:  Ms. Thorne is a 79 year old female who who had an abnormal screen mamogram.     2/13/2023 diagnostic mammogram:  irregular spiculated mass within the right breast.  Punctate calcifications in the segmental distribution within the left breast brown in from the 1:00 to 2:00 positions, anterior posterior depth measuring 2.9 x 6.9 x 6.1 cm in total dimension.    Ultrasound: Right breast at the 12 o'clock position, 4 cm from the nipple demonstrates a 1.1 x 0.9 x 1.6 cm hypoechoic mass with spiculated margins and associated architectural distortion.  Right axilla evaluation demonstrates a few nonenlarged and morphologically normal lymph nodes.  BI-RADS 5.     2/22/2023 ultrasound-guided core needle biopsy right breast  Invasive ductal carcinoma, grade 2, ER positive (100%), IN positive (10 to 20%), HER2/davon equivocal by IHC, negative by FISH     2/22/2023 stereotactic vacuum-assisted left breast biopsy  Benign breast tissue focal columnar cell change and moderate usual ductal hyperplasia     3/16/2023 right breast lumpectomy sentinel lymph node biopsy, pathology ([id number]): Invasive ductal carcinoma, grade 2, 25 x 20 x 10 mm in size.  Margins negative (3 mm medial).  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  Margins negative (3 mm medial and lateral).  Right axillary lymph node benign (0/1).  Stage pT2, pN0.  Oncotype DX: 23.     Completed radiation therapy as above and returns to clinic today for routine follow-up visit. No skin irritation.  No swelling of breast or upper extremity.   No new concering symptoms.  Is active and intentionally trying to loose weight with healthy diet.  Tolerating letrozole.  Recently went on a nice family trip. Questions today, answered.      Medications were reviewed and are up to date on EPIC.    The following portions of the patient's history were reviewed and updated as appropriate: allergies, current medications, past family history, past medical history, past social history, past surgical history and problem list.    Review of Systems:      General  Constitutional  Constitutional (WDL): All constitutional elements are within defined limits  EENT  Eye Disorders  Eye Disorder (WDL): Assessment not pertinent to visit  Ear Disorders  Ear Disorder (WDL): Assessment not pertinent to visit  Respiratory  Respiratory  Respiratory (WDL): All respiratory elements are within defined limits  Cardiovascular  Cardiovascular  Cardiovascular (WDL): All cardiovascular elements are within defined limits  Gastrointestinal  Gastrointestinal  Gastrointestinal (WDL): All gastrointestinal elements are within defined limits  Musculoskeletal  Musculoskeletal and Connective Tissue Disorders  Musculoskeletal & Connective (WDL): Exceptions to WDL  Arthralgia: Mild pain (low back)  Integumentary  Integumentary  Integumentary (WDL): All integumentary elements are within defined limits  Neurological  Neurosensory  Neurosensory (WDL): All neurosensory elements are within defined limits  Genitourinary/Reproductive  Genitourinary  Genitourinary (WDL): All genitourinary elements are within defined limits  Lymphatic  Lymph System Disorders  Lymph (WDL): All lymph elements are  within defined limits  Pain  Pain Score: No Pain (0)  AUA Assessment                                                              Accompanied by  Accompanied By: self only    Objective:     PHYSICAL EXAMINATION:    BP (!) 147/69 (BP Location: Left arm, Patient Position: Sitting)   Pulse 64   Resp 20   Wt 71.2 kg (156 lb 14.4 oz)   SpO2 95%   BMI 26.93 kg/m      Gen: Alert, in NAD pleasant interactive, well nourished  Eyes: EOMI, sclera anicteric  HENT     Head: NC/AT  Pulm: No wheezing, stridor or respiratory distress  Musculoskeletal: Normal muscle bulk and tone  Skin: Normal tone and turgor, warm, dry, intact  Chest:  Breast symmetry is stable post radiation.  She has no RT dermatitis or skin changes.  Good ROM, no lymphedema or other acute or subacute sequelae of her radiation.  Musculoskeletal: Normal muscle bulk and tone  Skin: Normal tone and turgor, warm, dry, intact  Neurologic: A/Ox3, face symmetric, speech fluent, no focal motor deficits, gait normal and unaided  Psychiatric: Appropriate mood and affect     Imaging: Imaging results 6 weeks:No results found.  BILATERAL FULL FIELD DIGITAL SCREENING MAMMOGRAM WITH TOMOSYNTHESIS     Performed on: 2/12/24     Compared to: 02/13/2023 and 02/08/2023     Technique:  Limited examination secondary to suboptimal positioning related to the patient's condition/factors. This study was evaluated with the assistance of Computer-Aided Detection.  Breast Tomosynthesis was used in interpretation.     Findings: The breasts have scattered areas of fibroglandular density.  There are interval breast conservation changes in the right breast., There are benign findings, not significantly changed of the left breast.  There is no radiographic evidence of malignancy.                                                                    IMPRESSION: ACR BI-RADS Category 2: Benign   Impression   77-year-old postmenopausal woman with Stage pT2, pN0 invasive ductal carcinoma of the  right breast, grade 2, ER/CO positive, HER2/davon negative status post lumpectomy and sentinel lymph node biopsy with negative margins.Oncotype DX: 23.      S/p Adjuvant RT recovered from acute side effects of RT and no evidence of residual or recurrent disease.   Visit Dx:    (C50.911) Invasive ductal carcinoma of breast, stage 1, right (H)  (primary encounter diagnosis)       Cancer Staging   Invasive ductal carcinoma of breast, stage 1, right (H)  Staging form: Breast, AJCC 8th Edition  - Pathologic: Stage IA (pT2, pN0, cM0, G2, ER+, CO+, HER2-) - Signed by Cheyenne Jaimes MD on 4/12/2023      Assessment & Plan:   1. Follow-up with medical oncology while on adjuvant endocrine therapy     2.  Mammogram indicated-12-months (next in February 2025)     3.  Return to clinic PRN        Pain Management Plan: N/A    Total time of this visit, including time spent face-to-face with patient and or via video/audio, and also in preparing for today's visit for MDM and documentation. Medical decision-making included consideration and possible diagnoses, management options, complex record review, review of diagnostic tests and information, consideration and discussion of significant complications based on comorbidities, discussion with providers involved in the care of the patient.     40 Minutes spent.        Cheyenne Jaimes MD  Department of Radiation Oncology   [facility] Radiation Oncology  Tel: [phone]  Page: [phone]    [facility]  [address] [place] [address]     [place] [id number] [facility] [place] [address]    CC:  Patient Care Team:  Shahid Ruggiero MD as PCP - General (Family Medicine)  Cheyenne Jaimes MD as MD (Radiation Oncology)  Xenia Garcia DO as MD (Surgery)  Xenia Garcia DO as Assigned Surgical Provider  Cory Carrasco MD as MD (Medical Oncology)  Cheyenne Jaimes MD as Assigned Cancer Care Provider  Shari Boudreaux RN as Specialty Care  Coordinator (Hematology & Oncology)

## 2024-07-10 NOTE — LETTER
7/10/2024      Cordelia Thorne  [address] [address]  [place] [place] [address]      Dear Colleague,    Thank you for referring your patient, Cordelia Thorne, to the [facility] RADIATION ONCOLOGY [place]. Please see a copy of my visit note below.    [facility] Radiation Oncology Follow Up     Patient: Cordelia Thorne  MRN: [id number]  Date of Service: 07/10/2024       DISEASE TREATED:   77-year-old postmenopausal woman with Stage pT2, pN0 invasive ductal carcinoma of the right breast, grade 2, ER/OR positive, HER2/davon negative status post lumpectomy and sentinel lymph node biopsy with negative margins.  Oncotype DX: 23.       TYPE OF RADIATION THERAPY ADMINISTERED:    SITE TREATED: Right breast  TOTAL DOSE: 2600 cGy  NUMBER OF FRACTIONS: 5 fractions  DATES COMPLETED: 5/3/2023 - 5/9/2023  CONCURRENT CHEMOTHERAPY: No  ADJUVANT THERAPY:Yes     INTERVAL SINCE COMPLETION OF RADIATION THERAPY: ~1 yr      SUBJECTIVE:  Ms. Thorne is a 79 year old female who who had an abnormal screen mamogram.     2/13/2023 diagnostic mammogram:  irregular spiculated mass within the right breast.  Punctate calcifications in the segmental distribution within the left breast brown in from the 1:00 to 2:00 positions, anterior posterior depth measuring 2.9 x 6.9 x 6.1 cm in total dimension.    Ultrasound: Right breast at the 12 o'clock position, 4 cm from the nipple demonstrates a 1.1 x 0.9 x 1.6 cm hypoechoic mass with spiculated margins and associated architectural distortion.  Right axilla evaluation demonstrates a few nonenlarged and morphologically normal lymph nodes.  BI-RADS 5.     2/22/2023 ultrasound-guided core needle biopsy right breast  Invasive ductal carcinoma, grade 2, ER positive (100%), OR positive (10 to 20%), HER2/davon equivocal by IHC, negative by FISH     2/22/2023 stereotactic vacuum-assisted left breast biopsy  Benign breast tissue focal columnar cell change and moderate usual ductal hyperplasia     3/16/2023 right breast  lumpectomy sentinel lymph node biopsy, pathology ([id number]): Invasive ductal carcinoma, grade 2, 25 x 20 x 10 mm in size.  Margins negative (3 mm medial).  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  Margins negative (3 mm medial and lateral).  Right axillary lymph node benign (0/1).  Stage pT2, pN0.  Oncotype DX: 23.     Completed radiation therapy as above and returns to clinic today for routine follow-up visit. No skin irritation.  No swelling of breast or upper extremity.   No new concering symptoms.  Is active and intentionally trying to loose weight with healthy diet.  Tolerating letrozole.  Recently went on a nice family trip. Questions today, answered.      Medications were reviewed and are up to date on EPIC.    The following portions of the patient's history were reviewed and updated as appropriate: allergies, current medications, past family history, past medical history, past social history, past surgical history and problem list.    Review of Systems:      General  Constitutional  Constitutional (WDL): All constitutional elements are within defined limits  EENT  Eye Disorders  Eye Disorder (WDL): Assessment not pertinent to visit  Ear Disorders  Ear Disorder (WDL): Assessment not pertinent to visit  Respiratory  Respiratory  Respiratory (WDL): All respiratory elements are within defined limits  Cardiovascular  Cardiovascular  Cardiovascular (WDL): All cardiovascular elements are within defined limits  Gastrointestinal  Gastrointestinal  Gastrointestinal (WDL): All gastrointestinal elements are within defined limits  Musculoskeletal  Musculoskeletal and Connective Tissue Disorders  Musculoskeletal & Connective (WDL): Exceptions to WDL  Arthralgia: Mild pain (low back)  Integumentary  Integumentary  Integumentary (WDL): All integumentary elements are within defined limits  Neurological  Neurosensory  Neurosensory (WDL):  All neurosensory elements are within defined limits  Genitourinary/Reproductive  Genitourinary  Genitourinary (WDL): All genitourinary elements are within defined limits  Lymphatic  Lymph System Disorders  Lymph (WDL): All lymph elements are within defined limits  Pain  Pain Score: No Pain (0)  AUA Assessment                                                              Accompanied by  Accompanied By: self only    Objective:     PHYSICAL EXAMINATION:    BP (!) 147/69 (BP Location: Left arm, Patient Position: Sitting)   Pulse 64   Resp 20   Wt 71.2 kg (156 lb 14.4 oz)   SpO2 95%   BMI 26.93 kg/m      Gen: Alert, in NAD pleasant interactive, well nourished  Eyes: EOMI, sclera anicteric  HENT     Head: NC/AT  Pulm: No wheezing, stridor or respiratory distress  Musculoskeletal: Normal muscle bulk and tone  Skin: Normal tone and turgor, warm, dry, intact  Chest:  Breast symmetry is stable post radiation.  She has no RT dermatitis or skin changes.  Good ROM, no lymphedema or other acute or subacute sequelae of her radiation.  Musculoskeletal: Normal muscle bulk and tone  Skin: Normal tone and turgor, warm, dry, intact  Neurologic: A/Ox3, face symmetric, speech fluent, no focal motor deficits, gait normal and unaided  Psychiatric: Appropriate mood and affect     Imaging: Imaging results 6 weeks:No results found.  BILATERAL FULL FIELD DIGITAL SCREENING MAMMOGRAM WITH TOMOSYNTHESIS     Performed on: 2/12/24     Compared to: 02/13/2023 and 02/08/2023     Technique:  Limited examination secondary to suboptimal positioning related to the patient's condition/factors. This study was evaluated with the assistance of Computer-Aided Detection.  Breast Tomosynthesis was used in interpretation.     Findings: The breasts have scattered areas of fibroglandular density.  There are interval breast conservation changes in the right breast., There are benign findings, not significantly changed of the left breast.  There is no  radiographic evidence of malignancy.                                                                    IMPRESSION: ACR BI-RADS Category 2: Benign   Impression   77-year-old postmenopausal woman with Stage pT2, pN0 invasive ductal carcinoma of the right breast, grade 2, ER/UT positive, HER2/davon negative status post lumpectomy and sentinel lymph node biopsy with negative margins.Oncotype DX: 23.      S/p Adjuvant RT recovered from acute side effects of RT and no evidence of residual or recurrent disease.   Visit Dx:    (C50.911) Invasive ductal carcinoma of breast, stage 1, right (H)  (primary encounter diagnosis)       Cancer Staging   Invasive ductal carcinoma of breast, stage 1, right (H)  Staging form: Breast, AJCC 8th Edition  - Pathologic: Stage IA (pT2, pN0, cM0, G2, ER+, UT+, HER2-) - Signed by Cheyenne Jaimes MD on 4/12/2023      Assessment & Plan:   1. Follow-up with medical oncology while on adjuvant endocrine therapy     2.  Mammogram indicated-12-months (next in February 2025)     3.  Return to clinic PRN        Pain Management Plan: N/A    Total time of this visit, including time spent face-to-face with patient and or via video/audio, and also in preparing for today's visit for MDM and documentation. Medical decision-making included consideration and possible diagnoses, management options, complex record review, review of diagnostic tests and information, consideration and discussion of significant complications based on comorbidities, discussion with providers involved in the care of the patient.     40 Minutes spent.        Cheyenne Jaimes MD  Department of Radiation Oncology   [facility] Radiation Oncology  Tel: [phone]  Page: [phone]    [facility]  [address] [place] [address]     [place] [id number]  [place] [address]    CC:  Patient Care Team:  Shahid Ruggiero MD as PCP - General (Family Medicine)  Cheyenne Jaimes MD as MD  "(Radiation Oncology)  Xenia Garcia DO as MD (Surgery)  Xenia Garcia DO as Assigned Surgical Provider  Croy Carrasco MD as MD (Medical Oncology)  Cheyenne Jaimes MD as Assigned Cancer Care Provider  Shari Boudreaux, RN as Specialty Care Coordinator (Hematology & Oncology)      Oncology Rooming Note    July 10, 2024 11:57 AM   Cordelia Thorne is a 79 year old female who presents for:    Chief Complaint   Patient presents with     Oncology Clinic Visit     Breast Cancer     Radiation Therapy     Follow up     Initial Vitals: BP (!) 147/69 (BP Location: Left arm, Patient Position: Sitting)   Pulse 64   Resp 20   Wt 71.2 kg (156 lb 14.4 oz)   SpO2 95%   BMI 26.93 kg/m   Estimated body mass index is 26.93 kg/m  as calculated from the following:    Height as of 5/8/24: 1.626 m (5' 4\").    Weight as of this encounter: 71.2 kg (156 lb 14.4 oz). Body surface area is 1.79 meters squared.  No Pain (0) Comment: Data Unavailable   No LMP recorded. Patient is postmenopausal.  Allergies reviewed: Yes  Medications reviewed: Yes    Medications: Medication refills not needed today.  Pharm[facility] into [id number] [address] [facility]    Frailty Screening:   Is the patient here for a new oncology consult visit in cancer care? 2. No      Clinical concerns: Follow up after RT completed on 5/9/2023, doing well, c/o some low back pain, informed pt that with age and posture that this is normal. Pt has always had lots of anxiety and concerned back is cancer.   Dr. Jaimes was notified.      Mora David, KAMALA                Again, thank you for allowing me to participate in the care of your patient.        Sincerely,        Cheyenne Jaimes MD  "

## 2024-07-10 NOTE — PROGRESS NOTES
"Oncology Rooming Note    July 10, 2024 11:57 AM   Cordelia Thorne is a 79 year old female who presents for:    Chief Complaint   Patient presents with    Oncology Clinic Visit    Breast Cancer    Radiation Therapy     Follow up     Initial Vitals: BP (!) 147/69 (BP Location: Left arm, Patient Position: Sitting)   Pulse 64   Resp 20   Wt 71.2 kg (156 lb 14.4 oz)   SpO2 95%   BMI 26.93 kg/m   Estimated body mass index is 26.93 kg/m  as calculated from the following:    Height as of 5/8/24: 1.626 m (5' 4\").    Weight as of this encounter: 71.2 kg (156 lb 14.4 oz). Body surface area is 1.79 meters squared.  No Pain (0) Comment: Data Unavailable   No LMP recorded. Patient is postmenopausal.  Allergies reviewed: Yes  Medications reviewed: Yes    Medications: Medication refills not needed today.  Pharmacy name entered into [employer]: [id number] [address] AT [facility] OF [facility]    Frailty Screening:   Is the patient here for a new oncology consult visit in cancer care? 2. No      Clinical concerns: Follow up after RT completed on 5/9/2023, doing well, c/o some low back pain, informed pt that with age and posture that this is normal. Pt has always had lots of anxiety and concerned back is cancer.   Dr. Jaimes was notified.      Mora David RN              "

## 2024-07-11 DIAGNOSIS — C50.911 INVASIVE DUCTAL CARCINOMA OF BREAST, STAGE 1, RIGHT (H): ICD-10-CM

## 2024-07-11 RX ORDER — LETROZOLE 2.5 MG/1
2.5 TABLET, FILM COATED ORAL DAILY
Qty: 90 TABLET | Refills: 0 | Status: SHIPPED | OUTPATIENT
Start: 2024-07-11 | End: 2024-10-07

## 2024-09-04 ENCOUNTER — PATIENT OUTREACH (OUTPATIENT)
Dept: ONCOLOGY | Facility: HOSPITAL | Age: 79
End: 2024-09-04
Payer: COMMERCIAL

## 2024-09-04 ENCOUNTER — ONCOLOGY VISIT (OUTPATIENT)
Dept: ONCOLOGY | Facility: HOSPITAL | Age: 79
End: 2024-09-04
Attending: INTERNAL MEDICINE
Payer: COMMERCIAL

## 2024-09-04 VITALS
RESPIRATION RATE: 16 BRPM | HEART RATE: 62 BPM | DIASTOLIC BLOOD PRESSURE: 64 MMHG | HEIGHT: 64 IN | WEIGHT: 157.4 LBS | TEMPERATURE: 98.6 F | OXYGEN SATURATION: 96 % | SYSTOLIC BLOOD PRESSURE: 136 MMHG | BODY MASS INDEX: 26.87 KG/M2

## 2024-09-04 DIAGNOSIS — C50.911 INVASIVE DUCTAL CARCINOMA OF BREAST, STAGE 1, RIGHT (H): Primary | ICD-10-CM

## 2024-09-04 PROCEDURE — G0463 HOSPITAL OUTPT CLINIC VISIT: HCPCS | Performed by: INTERNAL MEDICINE

## 2024-09-04 PROCEDURE — 99215 OFFICE O/P EST HI 40 MIN: CPT | Performed by: INTERNAL MEDICINE

## 2024-09-04 PROCEDURE — G2211 COMPLEX E/M VISIT ADD ON: HCPCS | Performed by: INTERNAL MEDICINE

## 2024-09-04 ASSESSMENT — PAIN SCALES - GENERAL: PAINLEVEL: NO PAIN (0)

## 2024-09-04 NOTE — PROGRESS NOTES
[facility] Hematology and Oncology Progress Note    Patient: Cordelia CABALLERO Parent  MRN: [id number]  9/04/24        Reason for Visit    Chief Complaint   Patient presents with    Oncology Clinic Visit     Return visit 4 months. Invasive ductal carcinoma of breast, stage 1, right.         Problem List Items Addressed This Visit    None                Assessment and Plan  Early right-sided hormone receptor positive and HR negative breast cancer, status postlumpectomy and adjuvant radiation  T2N0.  Pathologic prognostic stage Ia.  Oncotype DX 23.  Started adjuvant letrozole in July-August 2023.      Doing well on letrozole.  Overall doing well without any major side effects.  Does have some back pain issues which are still ongoing which is most likely osteoarthritis.  Letrozole can flareup osteoarthritis.  But she does not have any other diffuse joint aches.  No indications to stop therapy.  Clinical exam unremarkable today.  No evidence of any abnormal breast masses in either breast.  Does have some postsurgical changes.  No evidence of bilateral axillary adenopathy.  She needs a mammogram in February.  My plan was to see her back in 4 months for physical exam.  But she wants somebody to an exam after the mammogram.  I told her that this would not make a difference but she wanted to think about it.  She will call Dr. Mittal's office to see if they can see her after mammogram.  I did give her the option of coming back in 5 months after the mammogram so I can do a physical exam but she is hesitant to wait that long.  She will call us regarding her decision.    Again discussed about bisphosphonates for osteoporosis.  She wants to hold off on it.  I also answered extensively her multiple questions regarding diet other environmental factors and aids implications on cancer recurrence.     Cancer Staging   Invasive ductal carcinoma of breast, stage 1, right (H)  Staging form: Breast, AJCC 8th Edition  - Pathologic: Stage IA  (pT2, pN0, cM0, G2, ER+, KY+, HER2-) - Signed by Cheyenne Jaimes MD on 4/12/2023      ECOG Performance    1 - Can't do physically strenuous work, but fully ambyulatory and can do light sedentary work         Problem List    Patient Active Problem List   Diagnosis    Vertigo    Other specified phobia    Invasive ductal carcinoma of breast, stage 1, right (H)            Interval History   Cordelia CABALLERO Parent is a 77 year old female with early-stage right-sided hormone receptor positive and HER2 negative breast cancer status postlumpectomy and adjuvant radiation who is currently on endocrine therapy with letrozole is here for follow-up.    Doing well on letrozole.  Side effects.  She does complain of back pain issues which is still ongoing.  Most likely musculoskeletal due to arthritis.  Did undergo some physical therapy.  No new breast masses.  She does feel some soreness in her right axilla at times.  No weight loss.      Review of Systems  A comprehensive review of systems was negative except for what is noted in the interval history    Current Outpatient Medications   Medication Sig Dispense Refill    Ascorbic Acid (VITAMIN C) 500 MG CAPS Take 1 capsule by mouth daily      CALCIUM CITRATE PO Take 4 tablets by mouth 2 times daily      Lactobacillus rhamnosus GG (CULTURELLE) 15 billion cell CpSP [LACTOBACILLUS RHAMNOSUS GG (CULTURELLE) 15 BILLION CELL CPSP] Take 1 capsule by mouth daily.      letrozole (FEMARA) 2.5 MG tablet TAKE 1 TABLET(2.5 MG) BY MOUTH DAILY 90 tablet 0    MAGNESIUM GLYCINATE PO Take 1 capsule by mouth daily      Vitamin D (Cholecalciferol) 50 MCG (2000 UT) CAPS Take 2,000 Units by mouth daily Includes soybean oil as well      Calcium Carbonate-Vitamin D (CALCIUM-VITAMIN D3) 600-3.125 MG-MCG TABS Take 1 capsule by mouth 2 times daily (Patient not taking: Reported on 5/8/2024)       No current facility-administered medications for this visit.        Physical Exam    Failed to redirect to the  Timeline version of the REVFS SmartLink.    General: alert and cooperative  Breast status post left lumpectomy.  Scar healed well.  No new masses noted in either breast.  Mild lymphedema on the right breast.  No evidence of bilateral axillary adenopathy.:   CNS: Alert and oriented x3, neurologic exam grossly normal.    Lab Results    No results found for this or any previous visit (from the past 168 hour(s)).        Imaging    No results found.    A total of 45 min was spent today on this visit which included face to face conversation with the patient, EMR review, counseling and co-ordination of care and medical documentation.    The longitudinal plan of care for the diagnosis(es)/condition(s) as documented were addressed during this visit. Due to the added complexity in care, I will continue to support Marina in the subsequent management and with ongoing continuity of care.        Signed by: Cory Carrasco MD

## 2024-09-04 NOTE — PROGRESS NOTES
[address] [place] [address]: Cancer Care                                                                                      RN Cancer Care Coordinator accompanied Dr. Carrasco for breast exam. Pt had many questions about sensations near surgical sites, and Dr. Carrasco examined those carefully, describing that this was not cancerous tissue. She asked him to examine her spine where she has pain, and again assured her there is no evidence of cancer there. Likely routine osteoarthritis.     She had several questions about when she would see Dr. Carrasco vs when her annual mammogram is, and when she might see Dr. Garcia. We agreed that if she gets an appt with Dr. Garcia in January or February, she does not need to come to Dr. Carrasco, and her follow up can then be moved to May or June. She chose not to schedule here today, and will call after she checks with Dr. Garcia' office.     Signature:  Shari Boudreaux RN

## 2024-09-04 NOTE — PROGRESS NOTES
"Oncology Rooming Note    September 4, 2024 11:34 AM   Cordelia Thorne is a 79 year old female who presents for:    Chief Complaint   Patient presents with    Oncology Clinic Visit     Return visit 4 months. Invasive ductal carcinoma of breast, stage 1, right.     Initial Vitals: /64 (BP Location: Left arm, Patient Position: Sitting, Cuff Size: Adult Regular)   Pulse 62   Resp 16   Ht 1.626 m (5' 4\")   Wt 71.4 kg (157 lb 6.4 oz)   SpO2 96%   BMI 27.02 kg/m   Estimated body mass index is 27.02 kg/m  as calculated from the following:    Height as of this encounter: 1.626 m (5' 4\").    Weight as of this encounter: 71.4 kg (157 lb 6.4 oz). Body surface area is 1.8 meters squared.  Data Unavailable Comment: Data Unavailable   No LMP recorded. Patient is postmenopausal.  Allergies reviewed: Yes  Medications reviewed: Yes    Medications: Medication refills not needed today.  Pharmacy name entered into [employer]: [id number] [address] AT [facility] OF [facility]    Frailty Screening:   Is the patient here for a new oncology consult visit in cancer care? 2. No      Clinical concerns: none       Anna Doran CMA              "

## 2024-09-04 NOTE — LETTER
9/4/2024      Cordelia Thorne  [address] [address]  [facility] [address]      Dear Colleague,    Thank you for referring your patient, Cordelia Thorne, to the [facility] [place]. Please see a copy of my visit note below.    [facility] Hematology and Oncology Progress Note    Patient: Cordelia Thorne  MRN: [id number]  9/04/24        Reason for Visit    Chief Complaint   Patient presents with     Oncology Clinic Visit     Return visit 4 months. Invasive ductal carcinoma of breast, stage 1, right.         Problem List Items Addressed This Visit    None                Assessment and Plan  Early right-sided hormone receptor positive and HR negative breast cancer, status postlumpectomy and adjuvant radiation  T2N0.  Pathologic prognostic stage Ia.  Oncotype DX 23.  Started adjuvant letrozole in July-August 2023.      Doing well on letrozole.  Overall doing well without any major side effects.  Does have some back pain issues which are still ongoing which is most likely osteoarthritis.  Letrozole can flareup osteoarthritis.  But she does not have any other diffuse joint aches.  No indications to stop therapy.  Clinical exam unremarkable today.  No evidence of any abnormal breast masses in either breast.  Does have some postsurgical changes.  No evidence of bilateral axillary adenopathy.  She needs a mammogram in February.  My plan was to see her back in 4 months for physical exam.  But she wants somebody to an exam after the mammogram.  I told her that this would not make a difference but she wanted to think about it.  She will call Dr. Mittal's office to see if they can see her after mammogram.  I did give her the option of coming back in 5 months after the mammogram so I can do a physical exam but she is hesitant to wait that long.  She will call us regarding her decision.    Again discussed about bisphosphonates for osteoporosis.  She wants to hold off on it.  I also answered extensively her multiple  questions regarding diet other environmental factors and aids implications on cancer recurrence.     Cancer Staging   Invasive ductal carcinoma of breast, stage 1, right (H)  Staging form: Breast, AJCC 8th Edition  - Pathologic: Stage IA (pT2, pN0, cM0, G2, ER+, MD+, HER2-) - Signed by Cheyenne Jaimes MD on 4/12/2023      ECOG Performance    1 - Can't do physically strenuous work, but fully ambyulatory and can do light sedentary work         Problem List    Patient Active Problem List   Diagnosis     Vertigo     Other specified phobia     Invasive ductal carcinoma of breast, stage 1, right (H)            Interval History   Cordelia CABALLERO Parent is a 77 year old female with early-stage right-sided hormone receptor positive and HER2 negative breast cancer status postlumpectomy and adjuvant radiation who is currently on endocrine therapy with letrozole is here for follow-up.    Doing well on letrozole.  Side effects.  She does complain of back pain issues which is still ongoing.  Most likely musculoskeletal due to arthritis.  Did undergo some physical therapy.  No new breast masses.  She does feel some soreness in her right axilla at times.  No weight loss.      Review of Systems  A comprehensive review of systems was negative except for what is noted in the interval history    Current Outpatient Medications   Medication Sig Dispense Refill     Ascorbic Acid (VITAMIN C) 500 MG CAPS Take 1 capsule by mouth daily       CALCIUM CITRATE PO Take 4 tablets by mouth 2 times daily       Lactobacillus rhamnosus GG (CULTURELLE) 15 billion cell CpSP [LACTOBACILLUS RHAMNOSUS GG (CULTURELLE) 15 BILLION CELL CPSP] Take 1 capsule by mouth daily.       letrozole (FEMARA) 2.5 MG tablet TAKE 1 TABLET(2.5 MG) BY MOUTH DAILY 90 tablet 0     MAGNESIUM GLYCINATE PO Take 1 capsule by mouth daily       Vitamin D (Cholecalciferol) 50 MCG (2000 UT) CAPS Take 2,000 Units by mouth daily Includes soybean oil as well       Calcium Carbonate-Vitamin D  "(CALCIUM-VITAMIN D3) 600-3.125 MG-MCG TABS Take 1 capsule by mouth 2 times daily (Patient not taking: Reported on 5/8/2024)       No current facility-administered medications for this visit.        Physical Exam    Failed to redirect to the Timeline version of the [facility] SmartLink.    General: alert and cooperative  Breast status post left lumpectomy.  Scar healed well.  No new masses noted in either breast.  Mild lymphedema on the right breast.  No evidence of bilateral axillary adenopathy.:   CNS: Alert and oriented x3, neurologic exam grossly normal.    Lab Results    No results found for this or any previous visit (from the past 168 hour(s)).        Imaging    No results found.    A total of 45 min was spent today on this visit which included face to face conversation with the patient, EMR review, counseling and co-ordination of care and medical documentation.    The longitudinal plan of care for the diagnosis(es)/condition(s) as documented were addressed during this visit. Due to the added complexity in care, I will continue to support Marina in the subsequent management and with ongoing continuity of care.        Signed by: Cory Carrasco MD    Oncology Rooming Note    September 4, 2024 11:34 AM   Cordelia Thorne is a 79 year old female who presents for:    Chief Complaint   Patient presents with     Oncology Clinic Visit     Return visit 4 months. Invasive ductal carcinoma of breast, stage 1, right.     Initial Vitals: /64 (BP Location: Left arm, Patient Position: Sitting, Cuff Size: Adult Regular)   Pulse 62   Resp 16   Ht 1.626 m (5' 4\")   Wt 71.4 kg (157 lb 6.4 oz)   SpO2 96%   BMI 27.02 kg/m   Estimated body mass index is 27.02 kg/m  as calculated from the following:    Height as of this encounter: 1.626 m (5' 4\").    Weight as of this encounter: 71.4 kg (157 lb 6.4 oz). Body surface area is 1.8 meters squared.  Data Unavailable Comment: Data Unavailable   No LMP recorded. Patient is " postmenopausal.  Allergies reviewed: Yes  Medications reviewed: Yes    Medications: Medication refills not needed today.  Pharmacy name entered into [employer]: [facility] #[id number] [facility] [address] AT [facility]    Frailty Screening:   Is the patient here for a new oncology consult visit in cancer care? 2. No      Clinical concerns: none       Anna Doran CMA                Again, thank you for allowing me to participate in the care of your patient.        Sincerely,        Cory Carrasco MD

## 2024-09-09 ENCOUNTER — TELEPHONE (OUTPATIENT)
Dept: ONCOLOGY | Facility: CLINIC | Age: 79
End: 2024-09-09
Payer: COMMERCIAL

## 2024-09-09 NOTE — PROGRESS NOTES
CLINICAL NUTRITION SERVICES- ONCOLOGY DISTRESS SCREENING     Identified Concern and Score From Distress Screenin. How concerned are you about your ability to eat? :  0  2. How concerned are you about unintended weight loss or your current weight? : 10     Date of Distress Screenin/4     Findings: Phone call with Marina. Reports that she has been working on following a healthy diet and that she has asked Dr. Carrasco multiple diet questions.      Intervention: Discussed current PO intake and reviewed nutrition recommendations for a healthy diet and cancer prevention.      Follow-up Required: As needed.     Sue Raphael RD, REESE  [place] office [phone]

## 2024-09-11 ENCOUNTER — TELEPHONE (OUTPATIENT)
Dept: SURGERY | Facility: CLINIC | Age: 79
End: 2024-09-11
Payer: COMMERCIAL

## 2024-09-11 NOTE — TELEPHONE ENCOUNTER
Patient called, asking if it would be possible for Dr. Garcia to see her in February after her screening mammogram. Called patient back, told her Dr. Garcia would certainly see her if that is what patient wants.  LMOM for patient with scheduling number.

## 2024-10-07 DIAGNOSIS — C50.911 INVASIVE DUCTAL CARCINOMA OF BREAST, STAGE 1, RIGHT (H): ICD-10-CM

## 2024-10-07 RX ORDER — LETROZOLE 2.5 MG/1
2.5 TABLET, FILM COATED ORAL DAILY
Qty: 90 TABLET | Refills: 0 | Status: SHIPPED | OUTPATIENT
Start: 2024-10-07

## 2024-10-07 NOTE — TELEPHONE ENCOUNTER
Refill request received from [facility] requesting a refill of letrozole. Per Dr. Carrasco's note from 9/4/24: No indications to stop therapy. 3 months refill sent.     Hannah oRsenberg RN on 10/7/2024 at 10:36 AM

## 2024-11-07 ENCOUNTER — PATIENT OUTREACH (OUTPATIENT)
Dept: ONCOLOGY | Facility: HOSPITAL | Age: 79
End: 2024-11-07
Payer: COMMERCIAL

## 2024-11-07 ENCOUNTER — TELEPHONE (OUTPATIENT)
Dept: ONCOLOGY | Facility: HOSPITAL | Age: 79
End: 2024-11-07
Payer: COMMERCIAL

## 2024-11-07 NOTE — TELEPHONE ENCOUNTER
I received a phone call today from Marina.  She is calling requesting to talk to JOVANNA Mccarthy.  She states that she has questions from her last appointment and Shari was in the room on her the conversation.  I let her know that I would get this request over to JOVANNA Mccarthy.  She has no other questions or concerns for me at this time.

## 2024-11-07 NOTE — PROGRESS NOTES
[facility]: Cancer Care Follow-Up Note                                    Discussion with Patient:                                                      RN Cancer Care Coordinator returned patient's call, per note from triage.   Pt was clarifying her appointment needs. She had gotten a message today from scheduling about RTC with Dr. Carrasco. Our  did not know that Dr. Carrasco had instructed pt that if she was able to get an appt with Dr. Garcia after the planned mammogram in February, then he wouldn't need to see her until May or June. Writer affirmed that patient remembered this correctly. She will see Dr. Garcia same day at mammogram on 2/10/25.     She had another question, this one about symptoms: at last visit she told Dr. Carrasco about back/spine pain, which he examined, and suggested this was likely osteoarthritis. She reports that she still has this pain - in particular when she is sitting down or getting up from bed. Not during day while moving around, and not while laying in bed. She says her PT wondered to her that perhaps the vertebrae are inflamed. Her question for writer was if her bones are inflamed or get any weaker, does that increase the risk of cancer in her bones?      Writer assured her that the risk from arthritis or osteoporosis isn't cancer in the bones, but she risks broken bones. This is why Dr. Carrasco has been keen on her getting medicine for that. Explained that arthritis is a problem of the joints, not weakening bones. She feels she is doing what she can with PT to prevent further osteoporosis. We discussed the possibility of taking an occasional non-steroidal antiinflammatory such as ibuprofen for the pain she feels. She states that motrin gives her terrible heart-burn, and she would only want to take something if it would cure the problem, not simply mask the symptom.     Writer acknowledged her for getting her mammo and visit with Dr. Garcia scheduled, and relayed  that writer will let  know she doesn't need to see Dr. Carrasco again until May or June. She states she will call for this appointment, and doesn't feel she needs to be contacted for a reminder.      Education concerns: cancer vs osteoporosis risk     Goals          General     Medical (pt-stated)      Notes - Note created  3/14/2024  1:23 PM by Shari Boudreaux RN     To stay healthy and cancer free.              Dates of Treatment:                                                      Infusion given in last 28 days       None          Assessment:                                                       Pt is stable with few side effects from the letrozole. Trying to understand her symptoms of inflammation, osteoarthritis, and osteoporosis.   Managing her return appt schedule appropriately.     Intervention/Education provided during outreach:                                                       Clarified appts and Dr. Carrasco's instructions.   Did teaching about cancer risk vs bone breakage risk with osteoporosis.    Patient to [email or web address] message with updates  Confirmed patient has clinic and triage numbers  Pt will call for next appointment.    Signature:  Shari Boudreaux RN

## 2024-11-14 ENCOUNTER — LAB REQUISITION (OUTPATIENT)
Dept: LAB | Facility: CLINIC | Age: 79
End: 2024-11-14
Payer: COMMERCIAL

## 2024-11-14 DIAGNOSIS — Z13.9 ENCOUNTER FOR SCREENING, UNSPECIFIED: ICD-10-CM

## 2024-11-14 DIAGNOSIS — Z12.4 ENCOUNTER FOR SCREENING FOR MALIGNANT NEOPLASM OF CERVIX: ICD-10-CM

## 2024-11-14 DIAGNOSIS — Z13.220 ENCOUNTER FOR SCREENING FOR LIPOID DISORDERS: ICD-10-CM

## 2024-11-14 LAB
BASOPHILS # BLD AUTO: 0.1 10E3/UL (ref 0–0.2)
BASOPHILS NFR BLD AUTO: 1 %
EOSINOPHIL # BLD AUTO: 0.2 10E3/UL (ref 0–0.7)
EOSINOPHIL NFR BLD AUTO: 3 %
ERYTHROCYTE [DISTWIDTH] IN BLOOD BY AUTOMATED COUNT: 13.4 % (ref 10–15)
HCT VFR BLD AUTO: 43.2 % (ref 35–47)
HGB BLD-MCNC: 13.4 G/DL (ref 11.7–15.7)
IMM GRANULOCYTES # BLD: 0 10E3/UL
IMM GRANULOCYTES NFR BLD: 0 %
LYMPHOCYTES # BLD AUTO: 1 10E3/UL (ref 0.8–5.3)
LYMPHOCYTES NFR BLD AUTO: 17 %
MCH RBC QN AUTO: 28.5 PG (ref 26.5–33)
MCHC RBC AUTO-ENTMCNC: 31 G/DL (ref 31.5–36.5)
MCV RBC AUTO: 92 FL (ref 78–100)
MONOCYTES # BLD AUTO: 0.5 10E3/UL (ref 0–1.3)
MONOCYTES NFR BLD AUTO: 8 %
NEUTROPHILS # BLD AUTO: 4 10E3/UL (ref 1.6–8.3)
NEUTROPHILS NFR BLD AUTO: 70 %
NRBC # BLD AUTO: 0 10E3/UL
NRBC BLD AUTO-RTO: 0 /100
PLATELET # BLD AUTO: 205 10E3/UL (ref 150–450)
RBC # BLD AUTO: 4.71 10E6/UL (ref 3.8–5.2)
WBC # BLD AUTO: 5.7 10E3/UL (ref 4–11)

## 2024-11-14 PROCEDURE — G0123 SCREEN CERV/VAG THIN LAYER: HCPCS | Mod: ORL | Performed by: OBSTETRICS & GYNECOLOGY

## 2024-11-14 PROCEDURE — 80061 LIPID PANEL: CPT | Mod: ORL | Performed by: OBSTETRICS & GYNECOLOGY

## 2024-11-14 PROCEDURE — 80053 COMPREHEN METABOLIC PANEL: CPT | Mod: ORL | Performed by: OBSTETRICS & GYNECOLOGY

## 2024-11-14 PROCEDURE — 87624 HPV HI-RISK TYP POOLED RSLT: CPT | Mod: ORL | Performed by: OBSTETRICS & GYNECOLOGY

## 2024-11-14 PROCEDURE — 85025 COMPLETE CBC W/AUTO DIFF WBC: CPT | Mod: ORL | Performed by: OBSTETRICS & GYNECOLOGY

## 2024-11-15 LAB
ALBUMIN SERPL BCG-MCNC: 4.6 G/DL (ref 3.5–5.2)
ALP SERPL-CCNC: 92 U/L (ref 40–150)
ALT SERPL W P-5'-P-CCNC: 17 U/L (ref 0–50)
ANION GAP SERPL CALCULATED.3IONS-SCNC: 12 MMOL/L (ref 7–15)
AST SERPL W P-5'-P-CCNC: 22 U/L (ref 0–45)
BILIRUB SERPL-MCNC: 0.4 MG/DL
BUN SERPL-MCNC: 14.9 MG/DL (ref 8–23)
CALCIUM SERPL-MCNC: 10 MG/DL (ref 8.8–10.4)
CHLORIDE SERPL-SCNC: 103 MMOL/L (ref 98–107)
CHOLEST SERPL-MCNC: 227 MG/DL
CREAT SERPL-MCNC: 0.9 MG/DL (ref 0.51–0.95)
EGFRCR SERPLBLD CKD-EPI 2021: 66 ML/MIN/1.73M2
FASTING STATUS PATIENT QL REPORTED: ABNORMAL
GLUCOSE SERPL-MCNC: 116 MG/DL (ref 70–99)
HCO3 SERPL-SCNC: 25 MMOL/L (ref 22–29)
HDLC SERPL-MCNC: 55 MG/DL
HPV HR 12 DNA CVX QL NAA+PROBE: NEGATIVE
HPV16 DNA CVX QL NAA+PROBE: NEGATIVE
HPV18 DNA CVX QL NAA+PROBE: NEGATIVE
HUMAN PAPILLOMA VIRUS FINAL DIAGNOSIS: NORMAL
LDLC SERPL CALC-MCNC: 154 MG/DL
NONHDLC SERPL-MCNC: 172 MG/DL
POTASSIUM SERPL-SCNC: 4.5 MMOL/L (ref 3.4–5.3)
PROT SERPL-MCNC: 7.3 G/DL (ref 6.4–8.3)
SODIUM SERPL-SCNC: 140 MMOL/L (ref 135–145)
TRIGL SERPL-MCNC: 92 MG/DL

## 2024-11-20 ENCOUNTER — LAB (OUTPATIENT)
Dept: LAB | Facility: HOSPITAL | Age: 79
End: 2024-11-20
Payer: COMMERCIAL

## 2024-11-20 DIAGNOSIS — H54.7 UNSPECIFIED VISUAL LOSS: Primary | ICD-10-CM

## 2024-11-20 LAB
BKR AP ASSOCIATED HPV REPORT: NORMAL
BKR LAB AP GYN ADEQUACY: NORMAL
BKR LAB AP GYN INTERPRETATION: NORMAL
BKR LAB AP LMP: NORMAL
BKR LAB AP PREVIOUS ABNL DX: NORMAL
CRP SERPL-MCNC: 3.8 MG/L
ERYTHROCYTE [DISTWIDTH] IN BLOOD BY AUTOMATED COUNT: 13.2 % (ref 10–15)
ERYTHROCYTE [SEDIMENTATION RATE] IN BLOOD BY WESTERGREN METHOD: 11 MM/HR (ref 0–30)
HCT VFR BLD AUTO: 44.8 % (ref 35–47)
HGB BLD-MCNC: 14.2 G/DL (ref 11.7–15.7)
MCH RBC QN AUTO: 28.1 PG (ref 26.5–33)
MCHC RBC AUTO-ENTMCNC: 31.7 G/DL (ref 31.5–36.5)
MCV RBC AUTO: 89 FL (ref 78–100)
PATH REPORT.COMMENTS IMP SPEC: NORMAL
PATH REPORT.COMMENTS IMP SPEC: NORMAL
PATH REPORT.RELEVANT HX SPEC: NORMAL
PLATELET # BLD AUTO: 235 10E3/UL (ref 150–450)
RBC # BLD AUTO: 5.05 10E6/UL (ref 3.8–5.2)
WBC # BLD AUTO: 7.5 10E3/UL (ref 4–11)

## 2024-11-20 PROCEDURE — 85652 RBC SED RATE AUTOMATED: CPT

## 2024-11-20 PROCEDURE — 86140 C-REACTIVE PROTEIN: CPT

## 2024-11-20 PROCEDURE — 85027 COMPLETE CBC AUTOMATED: CPT

## 2024-11-20 PROCEDURE — 36415 COLL VENOUS BLD VENIPUNCTURE: CPT

## 2024-11-21 ENCOUNTER — TELEPHONE (OUTPATIENT)
Dept: ONCOLOGY | Facility: HOSPITAL | Age: 79
End: 2024-11-21
Payer: COMMERCIAL

## 2024-11-21 ENCOUNTER — PATIENT OUTREACH (OUTPATIENT)
Dept: ONCOLOGY | Facility: HOSPITAL | Age: 79
End: 2024-11-21
Payer: COMMERCIAL

## 2024-11-21 ENCOUNTER — TRANSCRIBE ORDERS (OUTPATIENT)
Dept: CARDIOLOGY | Facility: HOSPITAL | Age: 79
End: 2024-11-21
Payer: COMMERCIAL

## 2024-11-21 DIAGNOSIS — G45.8 OTHER SPECIFIED TRANSIENT CEREBRAL ISCHEMIAS: Primary | ICD-10-CM

## 2024-11-21 NOTE — TELEPHONE ENCOUNTER
"I received a phone call today from Marina.  She is calling because yesterday she lost the vision in one of her eyes.  She went in and saw the ophthalmologist and had some testing and is having an MRI of her head and neck today.  She did call the on-call doctor in the evening and was told to hold off of taking her letrozole until the testing is back.  She states she did get her vision back but is continuing to hold letrozole for now. Marina is calling because she wants Dr. Carrasco to review the request for the MRIs and advise if it is safe for her to proceed.  She states she had a family member who had some imaging done and \"went into shock\" from the dye so she is very concerned.  She would like to be called back at [phone].  Of note, her MRIs are today at 4 PM.    Hannah Rosenberg RN on 11/21/2024 at 12:52 PM    "

## 2024-11-21 NOTE — PROGRESS NOTES
"[facility]: Cancer Care                                                                                      RN Cancer Care Coordinator called patient at request of triage nurse, Hannah. She let me know that patient is scheduled for MRIs of brain and head/neck today and wanted to know if Dr. Carrasco approves of the MRI with & w/o contrast for her.   Writer asked him ahead of time, and he answered yes, he approves.    Called patient and she relays that yesterday she lost vision in rt eye for 2 minutes. She was able to get in to see her ophthalmologist in the after noon, who did several tests and ordered the MRI of head and neck, and the brain, w/ & w/o contrast. Ruling out stroke. She states they also want an ECHO and EKG, and she doesn't know when those will be scheduled.     Pt states that many years ago her mother had a reaction to dye and \"went into shock\". This makes her more nervous. She is concerned that because it is late in the afternoon there won't be staff around to help her if she has a reaction. Writer assured her that she is in the hospital building, and there are teams to respond to all emergencies. We went over that she is not at high risk of reacting, does not have allergies (neither did her mother, she said). Writer instructed her to let the staff know that she is nervous about this, and to let them know if she is feeling different sensations - such as itching, difficulty breathing, tongue swelling, etc. Assured her that if she went unconscious they would know it right away.     Last night, apparently Dr. King on call, told her to hold the letrozole until we get results. Writer told her to plan on restarting letrozole on Monday, but that Dr. Carrasco would see the results tomorrow and we would let her know if there are any different instructions. She is aware that heart attack and stroke are potential adverse effects from the medication. She said in a lab result this week, her cholesterol was " higher than it had been before.    She will call tomorrow to remind us to look at results.      Signature:  Shari Boudreaux RN

## 2024-11-22 ENCOUNTER — HOSPITAL ENCOUNTER (OUTPATIENT)
Dept: CARDIOLOGY | Facility: HOSPITAL | Age: 79
Discharge: HOME | End: 2024-11-22
Attending: FAMILY MEDICINE | Admitting: FAMILY MEDICINE
Payer: COMMERCIAL

## 2024-11-22 ENCOUNTER — ANCILLARY PROCEDURE (OUTPATIENT)
Dept: CARDIOLOGY | Facility: CLINIC | Age: 79
End: 2024-11-22
Attending: FAMILY MEDICINE
Payer: COMMERCIAL

## 2024-11-22 DIAGNOSIS — G45.8 OTHER SPECIFIED TRANSIENT CEREBRAL ISCHEMIAS: ICD-10-CM

## 2024-11-22 LAB
ATRIAL RATE - MUSE: 61 BPM
DIASTOLIC BLOOD PRESSURE - MUSE: NORMAL MMHG
INTERPRETATION ECG - MUSE: NORMAL
LVEF ECHO: NORMAL
P AXIS - MUSE: 46 DEGREES
PR INTERVAL - MUSE: 124 MS
QRS DURATION - MUSE: 112 MS
QT - MUSE: 408 MS
QTC - MUSE: 410 MS
R AXIS - MUSE: -52 DEGREES
SYSTOLIC BLOOD PRESSURE - MUSE: NORMAL MMHG
T AXIS - MUSE: 38 DEGREES
VENTRICULAR RATE- MUSE: 61 BPM

## 2024-11-22 PROCEDURE — 93306 TTE W/DOPPLER COMPLETE: CPT | Mod: GC | Performed by: STUDENT IN AN ORGANIZED HEALTH CARE EDUCATION/TRAINING PROGRAM

## 2024-11-22 PROCEDURE — 93010 ELECTROCARDIOGRAM REPORT: CPT | Performed by: INTERNAL MEDICINE

## 2024-11-22 PROCEDURE — 93005 ELECTROCARDIOGRAM TRACING: CPT

## 2024-11-25 ENCOUNTER — TELEPHONE (OUTPATIENT)
Dept: ONCOLOGY | Facility: HOSPITAL | Age: 79
End: 2024-11-25
Payer: COMMERCIAL

## 2024-11-25 ENCOUNTER — TRANSFERRED RECORDS (OUTPATIENT)
Dept: HEALTH INFORMATION MANAGEMENT | Facility: CLINIC | Age: 79
End: 2024-11-25
Payer: COMMERCIAL

## 2024-11-25 NOTE — TELEPHONE ENCOUNTER
I received a phone call today from Bessie at Dr. Jayne Chin's clinic.  She wanted Dr. Carrasco to be aware that after all of the testing there was no cause for the amaurosis fugax found.  They will plan to defer resuming letrozole to Dr. Carrasco and will see the patient in 4 months.  I let Bessie know that per Dr. Carrasco the patient is okay to resume her letrozole.  The patient did call today and spoke to haydee Joseph nurse and she was given this information as well.  If any questions for Dr. Chin she can be reached at [phone].    Hannah Rosenberg RN on 11/25/2024 at 4:15 PM

## 2024-11-25 NOTE — TELEPHONE ENCOUNTER
Patient calls to see if she can restart Letrozole. Patient is advised per Dr. Carrasco that she can restart medication. Patient reports that she was told by her eye doctor to stop taking aspirin. Patient is wondering if Dr. Carrasco wants to see her back any sooner in clinic?    Opal Snider RN

## 2024-12-03 ENCOUNTER — LAB REQUISITION (OUTPATIENT)
Dept: LAB | Facility: CLINIC | Age: 79
End: 2024-12-03
Payer: COMMERCIAL

## 2024-12-03 DIAGNOSIS — D48.5 NEOPLASM OF UNCERTAIN BEHAVIOR OF SKIN: ICD-10-CM

## 2024-12-03 PROCEDURE — 88305 TISSUE EXAM BY PATHOLOGIST: CPT | Mod: 26 | Performed by: DERMATOLOGY

## 2024-12-03 PROCEDURE — 88305 TISSUE EXAM BY PATHOLOGIST: CPT | Mod: TC,ORL | Performed by: DERMATOLOGY

## 2024-12-04 ENCOUNTER — LAB REQUISITION (OUTPATIENT)
Dept: LAB | Facility: CLINIC | Age: 79
End: 2024-12-04
Payer: COMMERCIAL

## 2024-12-04 DIAGNOSIS — M81.0 AGE-RELATED OSTEOPOROSIS WITHOUT CURRENT PATHOLOGICAL FRACTURE: ICD-10-CM

## 2024-12-04 PROCEDURE — 82306 VITAMIN D 25 HYDROXY: CPT | Mod: ORL | Performed by: NURSE PRACTITIONER

## 2024-12-05 LAB — VIT D+METAB SERPL-MCNC: 39 NG/ML (ref 20–50)

## 2024-12-06 LAB
PATH REPORT.COMMENTS IMP SPEC: NORMAL
PATH REPORT.COMMENTS IMP SPEC: NORMAL
PATH REPORT.FINAL DX SPEC: NORMAL
PATH REPORT.GROSS SPEC: NORMAL
PATH REPORT.MICROSCOPIC SPEC OTHER STN: NORMAL
PATH REPORT.RELEVANT HX SPEC: NORMAL

## 2025-01-27 DIAGNOSIS — M81.0 OSTEOPOROSIS: Primary | ICD-10-CM

## 2025-01-27 DIAGNOSIS — R73.03 PREDIABETES: ICD-10-CM

## 2025-02-12 ENCOUNTER — ANCILLARY PROCEDURE (OUTPATIENT)
Dept: MAMMOGRAPHY | Facility: CLINIC | Age: 80
End: 2025-02-12
Attending: FAMILY MEDICINE
Payer: COMMERCIAL

## 2025-02-12 ENCOUNTER — OFFICE VISIT (OUTPATIENT)
Dept: SURGERY | Facility: CLINIC | Age: 80
End: 2025-02-12
Attending: SURGERY
Payer: COMMERCIAL

## 2025-02-12 DIAGNOSIS — Z12.31 VISIT FOR SCREENING MAMMOGRAM: ICD-10-CM

## 2025-02-12 DIAGNOSIS — G89.29 CHRONIC PAIN OF LEFT THUMB: ICD-10-CM

## 2025-02-12 DIAGNOSIS — M79.645 CHRONIC PAIN OF LEFT THUMB: ICD-10-CM

## 2025-02-12 DIAGNOSIS — Z85.3 HX: BREAST CANCER: Primary | ICD-10-CM

## 2025-02-12 PROCEDURE — G0463 HOSPITAL OUTPT CLINIC VISIT: HCPCS | Performed by: SURGERY

## 2025-02-12 PROCEDURE — 99212 OFFICE O/P EST SF 10 MIN: CPT | Performed by: SURGERY

## 2025-02-12 PROCEDURE — 77063 BREAST TOMOSYNTHESIS BI: CPT

## 2025-02-12 PROCEDURE — 77067 SCR MAMMO BI INCL CAD: CPT

## 2025-02-12 NOTE — PROGRESS NOTES
History:  Cordelia Thorne presents for 2 year follow-up of breast cancer.  She is s/p right lumpectomy with SLN biopsy in March 2023.  This was followed by radiation and initiation of endocrine therapy.  She has been doing massage to the right breast.  She is doing this every day.  She has developed issues with her left thumb.  It is causing significant pain and if she forces bending the joint it will hurt significantly and pop.  She is also having worsening back pain.    Physical exam:  BREAST: Right side significantly smaller than the left.  No palpable abnormalities bilaterally.    Imaging:  Pertinent images personally reviewed by myself and discussed with the patient.  Radiology reports:  BILATERAL FULL FIELD DIGITAL SCREENING MAMMOGRAM WITH TOMOSYNTHESIS     Performed on: 2/12/25     Compared to: 02/12/2024, 02/13/2023, and 02/08/2023     Technique:  This study was evaluated with the assistance of Computer-Aided Detection.  Breast Tomosynthesis was used in interpretation.     Findings: There are scattered areas of fibroglandular density.  There are breast conservation changes in the right breast. There are benign appearing calcifications.  There is no radiographic evidence of malignancy.                                                                    IMPRESSION: ACR BI-RADS Category 2: Benign    ASSESSMENT:  Cordelia Thorne is s/p right lumpectomy for invasive ductal carcinoma    - Grade II, T2, N0, ER/ME+, HER2-  --> pathologic prognostic stage IA    PLAN:  Reviewed mammogram images.  These were shown to the patient.  Referral placed to orthopedics to evaluate her left thumb.  Continue systemic therapy per Dr. Carrasco.    Return to breast clinic in 1 year after mammogram.    Xenia Garcia DO  General Surgeon  [facility]  Breast Center - [address] [address]  Office: [phone]  Employed by - [facility]

## 2025-02-12 NOTE — NURSING NOTE
Marina presents to [facility] [place] today for a follow up appointment with Dr. Garcia .Patient notes no new breast concerns.  Patient had mammogram done today, see report for details.  She is following with Dr. Carrasco  for medical oncology and is taking endocrine therapy, tolerating it well.  RN assessment and EMRupdate.  Patient met with Dr. Garcia .  See dictation for details of visit and follow up plan.  Support provided, invited calls.

## 2025-02-12 NOTE — LETTER
2/12/2025      Cordelia Thorne  [address] [address]  [facility] [address]      Dear Colleague,    Thank you for referring your patient, Cordelia Thorne, to the [facility] [place]. Please see a copy of my visit note below.    History:  Cordelia Thorne presents for 2 year follow-up of breast cancer.  She is s/p right lumpectomy with SLN biopsy in March 2023.  This was followed by radiation and initiation of endocrine therapy.  She has been doing massage to the right breast.  She is doing this every day.  She has developed issues with her left thumb.  It is causing significant pain and if she forces bending the joint it will hurt significantly and pop.  She is also having worsening back pain.    Physical exam:  BREAST: Right side significantly smaller than the left.  No palpable abnormalities bilaterally.    Imaging:  Pertinent images personally reviewed by myself and discussed with the patient.  Radiology reports:  BILATERAL FULL FIELD DIGITAL SCREENING MAMMOGRAM WITH TOMOSYNTHESIS     Performed on: 2/12/25     Compared to: 02/12/2024, 02/13/2023, and 02/08/2023     Technique:  This study was evaluated with the assistance of Computer-Aided Detection.  Breast Tomosynthesis was used in interpretation.     Findings: There are scattered areas of fibroglandular density.  There are breast conservation changes in the right breast. There are benign appearing calcifications.  There is no radiographic evidence of malignancy.                                                                    IMPRESSION: ACR BI-RADS Category 2: Benign    ASSESSMENT:  Cordelia Thorne is s/p right lumpectomy for invasive ductal carcinoma    - Grade II, T2, N0, ER/CA+, HER2-  --> pathologic prognostic stage IA    PLAN:  Reviewed mammogram images.  These were shown to the patient.  Referral placed to orthopedics to evaluate her left thumb.  Continue systemic therapy per Dr. Carrasco.    Return to breast clinic in 1 year after  mammogram.    Xenia Garcia DO  General Surgeon  [facility]  Breast Center [address] [address]  Office: [phone]  Employed by - [facility]      Again, thank you for allowing me to participate in the care of your patient.        Sincerely,        Xenia Garcia DO    Electronically signed

## 2025-02-13 ENCOUNTER — TELEPHONE (OUTPATIENT)
Dept: SURGERY | Facility: CLINIC | Age: 80
End: 2025-02-13
Payer: COMMERCIAL

## 2025-02-13 NOTE — TELEPHONE ENCOUNTER
Marina  called, saw Dr. Garcia for a follow up appointment yesterday and just had a few more questions for Dr. Garcia.  Reviewed with Dr. Garcia, called patient back with reassurance and answers. She voices being so happy with Dr. Garcia and her care here. Support provided, invited calls.

## 2025-02-18 ENCOUNTER — TELEPHONE (OUTPATIENT)
Dept: ONCOLOGY | Facility: HOSPITAL | Age: 80
End: 2025-02-18
Payer: COMMERCIAL

## 2025-02-18 NOTE — TELEPHONE ENCOUNTER
I received a voice message today from Marina.  She is calling requesting a phone call back from JOVANNA Mccarthy at her convenience.  She can be reached at [phone].    Hannah Rosenberg RN on 2/18/2025 at 4:29 PM

## 2025-02-19 ENCOUNTER — PATIENT OUTREACH (OUTPATIENT)
Dept: ONCOLOGY | Facility: HOSPITAL | Age: 80
End: 2025-02-19
Payer: COMMERCIAL

## 2025-02-19 NOTE — PROGRESS NOTES
[facility]: Cancer Care                                                                                        Pt calls in this afternoon with several questions she would like [facility] to ask Dr. Carrasco.    1) pt uses Vaseline on her lips and hands in winter due to chapping. Wants to know if this can cause cancer.    2 a) Pt's PCP tells her that Vit D is low. Lab shows 39. The doctor has asked her to increase the dose of Vit D supplementation to 3000 international unit(s) /day. Associated with this provider's care, pt is having labs drawn here at our clinic lab. Note from [employer] documentation: Please Draw the following labs for Marina, she had an appt on 3/5/2025: vitamin D, BMP, diagnosis osteoporosis, CBC and hemoglobin A1c, lipid cascade, Diagnosis prediabetes   Please fax lab results to Dr. 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 She is concerned about the contrast dye that was used. She wants to know if this increases her risk of breast cancer or other cancers. She would like to know if there are blood tests that Dr. Carrasco would order that might detect if this dye has accumulated in her breasts or any other parts of her body. (Lab appt is 3/5)    3) Orthopedics issues: Left thumb pain and swelling from over use with carrying 5lb weights. She has an appointment at [facility] is concerned about her exposure to radiation if she needs xrays for her hand. She also has an issue with back pain - that hurts when she sits or is lying down. She has postponed getting this evaluated because she said that Dr. Carrasco had conceded this would likely mean another xray.  Writer had in depth discussion with her about the need to balance caring for her health and injuries with her desire to minimize radiation exposure. Encouraged her to follow through with the appointment that is set up at [facility], and hopefully get hand therapy. Assured her that the radiation exposure  "for her hand will be minuscule.     4) Dental xrays: She has a dental appointment on Friday 2/21. She says they are set to do bite wing xrays.  She again wants to know how concerned about the radiation of these she should be. In particular she says a mammography tech told her that they don't wear lead aprons anymore because \"the radiation collects in other parts of the body\". She wants to know if she should wear the lead shield at the dental office, or decline to wear it.     Writer explained that we all have to make choices as we age between getting the care that is needed and recommended, vs trying to minimize our exposure to potentially harmful substances.     Writer let her know I will ask Dr. Carrasco for her, and get back to her this afternoon or tomorrow. She voiced understanding.     Signature:  Shari Boudreaux RN  "

## 2025-02-20 ENCOUNTER — PATIENT OUTREACH (OUTPATIENT)
Dept: ONCOLOGY | Facility: HOSPITAL | Age: 80
End: 2025-02-20
Payer: COMMERCIAL

## 2025-02-20 DIAGNOSIS — M81.0 AGE-RELATED OSTEOPOROSIS WITHOUT CURRENT PATHOLOGICAL FRACTURE: ICD-10-CM

## 2025-02-20 DIAGNOSIS — C50.911 INVASIVE DUCTAL CARCINOMA OF BREAST, STAGE 1, RIGHT (H): Primary | ICD-10-CM

## 2025-02-20 DIAGNOSIS — R73.03 PREDIABETES: ICD-10-CM

## 2025-02-20 NOTE — PROGRESS NOTES
[facility]: Cancer Care                                                                                      RN Cancer Care Coordinator called patient with answers from her questions yesterday for Dr. Carrasco. Writer went over the list with her, answering each one (see previous note). Explained that...   > Vaseline on lips and hands will not cause cancer   > He does not have additional labs to test for the MRI contrast absorbing into other body parts/organs  > He encourages her to get the orthopedic care she needs as the hand pain and the back pain has continued on for multiple months and is impacting her life. In particular the hand pain seems to be quite painful. Assured her that the amount of radiation for xrays of the hand or back are minimal.   She asks if she should request an MRI instead of xrays. Writer told her that an xray may be a requirement prior to an MRI. Encouraged her to communicate with the orthopedic providers she sees, letting them know that she has a strong preference for minimizing the amount of radiation and contrast she is exposed to. But we encouraged her to seek care. Writer encouraged her to see if there is hand therapy or PT for her back that may help her.   > Lastly, Dr. Carrasco did not have any information about lead aprons/shield trapping radiation. He recommended that she follow the dentist's recommendation for what they do in their practice.     Pt asked clarifying questions and voiced understaffing.  She will be back on 3/5 for labs.    Signature:  Shari Boudreaux RN

## 2025-02-21 ENCOUNTER — TRANSFERRED RECORDS (OUTPATIENT)
Dept: HEALTH INFORMATION MANAGEMENT | Facility: CLINIC | Age: 80
End: 2025-02-21
Payer: COMMERCIAL

## 2025-03-01 ENCOUNTER — TRANSFERRED RECORDS (OUTPATIENT)
Dept: HEALTH INFORMATION MANAGEMENT | Facility: CLINIC | Age: 80
End: 2025-03-01
Payer: COMMERCIAL

## 2025-03-10 ENCOUNTER — TELEPHONE (OUTPATIENT)
Dept: ONCOLOGY | Facility: HOSPITAL | Age: 80
End: 2025-03-10
Payer: COMMERCIAL

## 2025-03-10 NOTE — TELEPHONE ENCOUNTER
I received a phone call today from Marina.  She is calling because she is scheduled to come in tomorrow for fasting labs.  She is wondering if she is okay to drink water.  I let her know that it is okay to drink water.  She has no other questions at this time.    Hannah Rosenberg RN on 3/10/2025 at 3:41 PM

## 2025-03-11 ENCOUNTER — TELEPHONE (OUTPATIENT)
Dept: ONCOLOGY | Facility: HOSPITAL | Age: 80
End: 2025-03-11

## 2025-03-11 ENCOUNTER — LAB (OUTPATIENT)
Dept: INFUSION THERAPY | Facility: HOSPITAL | Age: 80
End: 2025-03-11
Attending: INTERNAL MEDICINE
Payer: COMMERCIAL

## 2025-03-11 DIAGNOSIS — C50.911 INVASIVE DUCTAL CARCINOMA OF BREAST, STAGE 1, RIGHT (H): ICD-10-CM

## 2025-03-11 DIAGNOSIS — R73.03 PREDIABETES: ICD-10-CM

## 2025-03-11 DIAGNOSIS — M81.0 AGE-RELATED OSTEOPOROSIS WITHOUT CURRENT PATHOLOGICAL FRACTURE: ICD-10-CM

## 2025-03-11 LAB
ANION GAP SERPL CALCULATED.3IONS-SCNC: 11 MMOL/L (ref 7–15)
BASOPHILS # BLD AUTO: 0.1 10E3/UL (ref 0–0.2)
BASOPHILS NFR BLD AUTO: 1 %
BUN SERPL-MCNC: 14.5 MG/DL (ref 8–23)
CALCIUM SERPL-MCNC: 10.5 MG/DL (ref 8.8–10.4)
CHLORIDE SERPL-SCNC: 104 MMOL/L (ref 98–107)
CHOLEST SERPL-MCNC: 188 MG/DL
CREAT SERPL-MCNC: 0.96 MG/DL (ref 0.51–0.95)
EGFRCR SERPLBLD CKD-EPI 2021: 61 ML/MIN/1.73M2
EOSINOPHIL # BLD AUTO: 0.2 10E3/UL (ref 0–0.7)
EOSINOPHIL NFR BLD AUTO: 4 %
ERYTHROCYTE [DISTWIDTH] IN BLOOD BY AUTOMATED COUNT: 13.3 % (ref 10–15)
EST. AVERAGE GLUCOSE BLD GHB EST-MCNC: 117 MG/DL
FASTING STATUS PATIENT QL REPORTED: YES
FASTING STATUS PATIENT QL REPORTED: YES
GLUCOSE SERPL-MCNC: 107 MG/DL (ref 70–99)
HBA1C MFR BLD: 5.7 %
HCO3 SERPL-SCNC: 27 MMOL/L (ref 22–29)
HCT VFR BLD AUTO: 40.9 % (ref 35–47)
HDLC SERPL-MCNC: 56 MG/DL
HGB BLD-MCNC: 13.3 G/DL (ref 11.7–15.7)
IMM GRANULOCYTES # BLD: 0 10E3/UL
IMM GRANULOCYTES NFR BLD: 0 %
LDLC SERPL CALC-MCNC: 119 MG/DL
LYMPHOCYTES # BLD AUTO: 1.2 10E3/UL (ref 0.8–5.3)
LYMPHOCYTES NFR BLD AUTO: 21 %
MCH RBC QN AUTO: 28.6 PG (ref 26.5–33)
MCHC RBC AUTO-ENTMCNC: 32.5 G/DL (ref 31.5–36.5)
MCV RBC AUTO: 88 FL (ref 78–100)
MONOCYTES # BLD AUTO: 0.6 10E3/UL (ref 0–1.3)
MONOCYTES NFR BLD AUTO: 10 %
NEUTROPHILS # BLD AUTO: 3.8 10E3/UL (ref 1.6–8.3)
NEUTROPHILS NFR BLD AUTO: 64 %
NONHDLC SERPL-MCNC: 132 MG/DL
NRBC # BLD AUTO: 0 10E3/UL
NRBC BLD AUTO-RTO: 0 /100
PLATELET # BLD AUTO: 187 10E3/UL (ref 150–450)
POTASSIUM SERPL-SCNC: 4.3 MMOL/L (ref 3.4–5.3)
RBC # BLD AUTO: 4.65 10E6/UL (ref 3.8–5.2)
SODIUM SERPL-SCNC: 142 MMOL/L (ref 135–145)
TRIGL SERPL-MCNC: 64 MG/DL
VIT D+METAB SERPL-MCNC: 52 NG/ML (ref 20–50)
WBC # BLD AUTO: 5.9 10E3/UL (ref 4–11)

## 2025-03-11 PROCEDURE — 82306 VITAMIN D 25 HYDROXY: CPT

## 2025-03-11 PROCEDURE — 82565 ASSAY OF CREATININE: CPT

## 2025-03-11 PROCEDURE — 82435 ASSAY OF BLOOD CHLORIDE: CPT

## 2025-03-11 PROCEDURE — 80061 LIPID PANEL: CPT

## 2025-03-11 PROCEDURE — 36415 COLL VENOUS BLD VENIPUNCTURE: CPT

## 2025-03-11 PROCEDURE — 83036 HEMOGLOBIN GLYCOSYLATED A1C: CPT

## 2025-03-11 PROCEDURE — 85025 COMPLETE CBC W/AUTO DIFF WBC: CPT

## 2025-04-02 DIAGNOSIS — C50.911 INVASIVE DUCTAL CARCINOMA OF BREAST, STAGE 1, RIGHT (H): ICD-10-CM

## 2025-04-02 RX ORDER — LETROZOLE 2.5 MG/1
2.5 TABLET, FILM COATED ORAL DAILY
Qty: 90 TABLET | Refills: 0 | Status: SHIPPED | OUTPATIENT
Start: 2025-04-02

## 2025-05-09 ENCOUNTER — ONCOLOGY VISIT (OUTPATIENT)
Dept: ONCOLOGY | Facility: HOSPITAL | Age: 80
End: 2025-05-09
Payer: COMMERCIAL

## 2025-05-09 VITALS
RESPIRATION RATE: 20 BRPM | BODY MASS INDEX: 28.17 KG/M2 | HEART RATE: 50 BPM | HEIGHT: 64 IN | WEIGHT: 165 LBS | OXYGEN SATURATION: 96 % | SYSTOLIC BLOOD PRESSURE: 128 MMHG | TEMPERATURE: 98.7 F | DIASTOLIC BLOOD PRESSURE: 60 MMHG

## 2025-05-09 DIAGNOSIS — C50.911 INVASIVE DUCTAL CARCINOMA OF BREAST, STAGE 1, RIGHT (H): Primary | ICD-10-CM

## 2025-05-09 DIAGNOSIS — M79.18 MUSCULOSKELETAL PAIN: ICD-10-CM

## 2025-05-09 DIAGNOSIS — M81.0 AGE-RELATED OSTEOPOROSIS WITHOUT CURRENT PATHOLOGICAL FRACTURE: ICD-10-CM

## 2025-05-09 PROCEDURE — 99215 OFFICE O/P EST HI 40 MIN: CPT | Performed by: INTERNAL MEDICINE

## 2025-05-09 PROCEDURE — G2211 COMPLEX E/M VISIT ADD ON: HCPCS | Performed by: INTERNAL MEDICINE

## 2025-05-09 PROCEDURE — G0463 HOSPITAL OUTPT CLINIC VISIT: HCPCS | Performed by: INTERNAL MEDICINE

## 2025-05-09 ASSESSMENT — PAIN SCALES - GENERAL: PAINLEVEL_OUTOF10: NO PAIN (0)

## 2025-05-09 NOTE — Clinical Note
"5/9/2025      Cordelia Thorne  [address] [address]  [facility] [address]      Dear Colleague,    Thank you for referring your patient, Cordelia Thorne, to the [facility]. Please see a copy of my visit note below.    Oncology Rooming Note    May 9, 2025 11:46 AM   Cordelia Thorne is a 79 year old female who presents for:    Chief Complaint   Patient presents with    Oncology Clinic Visit     4 month follow up     Initial Vitals: /60 (BP Location: Left arm, Patient Position: Sitting, Cuff Size: Adult Regular)   Pulse 50   Temp 98.7  F (37.1  C) (Tympanic)   Resp 20   Ht 1.626 m (5' 4\")   Wt 74.8 kg (165 lb)   SpO2 96%   BMI 28.32 kg/m   Estimated body mass index is 28.32 kg/m  as calculated from the following:    Height as of this encounter: 1.626 m (5' 4\").    Weight as of this encounter: 74.8 kg (165 lb). Body surface area is 1.84 meters squared.  No Pain (0) Comment: Data Unavailable   No LMP recorded. Patient is postmenopausal.  Allergies reviewed: Yes  Medications reviewed: Yes    Medications: Medication refills not needed today.  Pharmacy name entered into [employer]: [id number] [address] AT [facility]    Frailty Screening:   Is the patient here for a new oncology consult visit in cancer care? 2. No    PHQ9:  Did this patient require a PHQ9?: No      Clinical concerns:       CARLOS A LEUNG CMA                Again, thank you for allowing me to participate in the care of your patient.        Sincerely,        Cory Carrasco MD    Electronically signed"

## 2025-05-09 NOTE — PROGRESS NOTES
"Oncology Rooming Note    May 9, 2025 11:46 AM   Cordelia CABALLERO Parent is a 79 year old female who presents for:    Chief Complaint   Patient presents with    Oncology Clinic Visit     4 month follow up     Initial Vitals: /60 (BP Location: Left arm, Patient Position: Sitting, Cuff Size: Adult Regular)   Pulse 50   Temp 98.7  F (37.1  C) (Tympanic)   Resp 20   Ht 1.626 m (5' 4\")   Wt 74.8 kg (165 lb)   SpO2 96%   BMI 28.32 kg/m   Estimated body mass index is 28.32 kg/m  as calculated from the following:    Height as of this encounter: 1.626 m (5' 4\").    Weight as of this encounter: 74.8 kg (165 lb). Body surface area is 1.84 meters squared.  No Pain (0) Comment: Data Unavailable   No LMP recorded. Patient is postmenopausal.  Allergies reviewed: Yes  Medications reviewed: Yes    Medications: Medication refills not needed today.  Pharmacy [facility] entered into [id number] [address] AT [facility]    Frailty Screening:   Is the patient here for a new oncology consult visit in cancer care? 2. No    PHQ9:  Did this patient require a PHQ9?: No      Clinical concerns:       CARLOS A LEUNG CMA            "

## 2025-05-09 NOTE — LETTER
postlumpectomy and adjuvant radiation  T2N0.  Pathologic prognostic stage Ia.  Oncotype DX 23.  Started adjuvant letrozole in July-August 2023.      Doing well on letrozole.      Results  - Mammogram in February: Report was good.  - Blood test in March:  - Cholesterol was high.  - Glucose was high but not diabetic.  - Calcium was slightly high at 10.5 (should have been 10).  - Vitamin D was high.  - Creatinine was slightly high at 0.96.  - Eye tests: All results were normal.  - Electrocardiogram: Showed a little irregularity, but not concerning.  - MRI/MRA: Six MRIs performed, results were normal.  - Red and white blood cell counts in March: Normal.  - MRI for back: Results showed thoracic and lumbar scoliosis, stenosis near vertebrae four and five, lumbar spondylosis with radiculopathy, all mild.  - I reviewed lab results personally and independently interpreted the results.      Plan  Invasive ductal carcinoma of breast, stage 1, right (H):  No new concerns regarding the breast cancer. Recent mammogram report was good, and there is no indication of metastasis to the bones or muscles.  Radiation dermatitis noted..  Continue letrozole with periodic physical exam.  Risks and side effects: Radiation to the breast can increase the risk of angiosarcoma, a rare blood vessel cancer, many years later.    Osteoporosis  Previously I had strongly recommended considering bisphosphonate therapy.  But she has declined it.  Currently on vitamin D and calcium supplementation.  Continue weightbearing exercises.  Will need repeat DEXA scan sometime this year.    Musculoskeletal pain:  Musculoskeletal pain likely related to letrozole, which can cause joint and muscle aches. Previous injuries and lack of use may contribute to stiffness and pain. Physical therapy has been beneficial.  Continue physical therapy exercises. Follow physical therapist's recommendations regarding weight lifting. No further imaging unless advised by an  orthopedist.      Cancer Staging   Invasive ductal carcinoma of breast, stage 1, right (H)  Staging form: Breast, AJCC 8th Edition  - Pathologic: Stage IA (pT2, pN0, cM0, G2, ER+, KS+, HER2-) - Signed by Cheyenne Jaimes MD on 4/12/2023      ECOG Performance    1 - Can't do physically strenuous work, but fully ambyulatory and can do light sedentary work         Problem List    Patient Active Problem List   Diagnosis     Vertigo     Other specified phobia     Invasive ductal carcinoma of breast, stage 1, right (H)            Interval History   Cordelia Thorne is a 79 year old female with early-stage right-sided hormone receptor positive and HER2 negative breast cancer status postlumpectomy and adjuvant radiation who is currently on endocrine therapy with letrozole is here for follow-up.    Doing well on letrozole.     She reported several health concerns and updates since her last visit. She mentioned experiencing hair thinning, which she suspects may be a side effect of letrozole. She had a mammogram in February, which yielded a good report, alleviating some of her anxiety. In March, a blood test revealed high cholesterol levels, despite her healthy diet, and slightly elevated glucose levels, though not diabetic. Her calcium and vitamin D levels were also high, prompting her to adjust her vitamin D intake. She expressed concern about the potential for calcium deposits in the breast but was reassured that this is not necessarily the case.    Marina recounted an episode in mid-November where she experienced temporary vision loss in one eye, accompanied by flashing lights. This led to a series of tests, including MRIs and an echocardiogram, which showed a slight irregularity but were otherwise normal. She also described a painful experience during the MRI due to the contrast dye, which caused a burning sensation.    She has been dealing with a trigger finger in her left hand, diagnosed after carrying weights, which was  treated with a cortisone injection. This condition, along with past injuries to her shoulder and collarbone, has limited her arm's mobility. Recently, she has experienced shoulder pain, particularly at night, which she fears might be related to cancer metastasis, though she was reassured that it is likely due to past injuries and muscle stiffness.    Marina also mentioned a radiation burn from previous treatment, which she was told could slightly increase the risk of a rare blood vessel cancer, angiosarcoma, many years later. She has been undergoing physical therapy for thoracic and lumbar scoliosis, stenosis, and lumbar spondylosis, which has alleviated her leg pain. She attributes some of her joint and muscle pain to letrozole, which she started two years ago. Despite these challenges, she continues to engage in physical therapy exercises, which have been beneficial. Additionally, she noted gaining weight, possibly due to letrozole, and expressed a desire to increase her physical activity.       Review of Systems  A comprehensive review of systems was negative except for what is noted in the interval history    Current Outpatient Medications   Medication Sig Dispense Refill     Ascorbic Acid (VITAMIN C) 500 MG CAPS Take 1 capsule by mouth daily       CALCIUM CITRATE PO Take 4 tablets by mouth 2 times daily       Lactobacillus rhamnosus GG (CULTURELLE) 15 billion cell CpSP [LACTOBACILLUS RHAMNOSUS GG (CULTURELLE) 15 BILLION CELL CPSP] Take 1 capsule by mouth daily.       letrozole (FEMARA) 2.5 MG tablet Take 1 tablet (2.5 mg) by mouth daily. 90 tablet 0     MAGNESIUM GLYCINATE PO Take 1 capsule by mouth daily       Vitamin D (Cholecalciferol) 50 MCG (2000 UT) CAPS Take 2,000 Units by mouth. 2000 units daily with an additional 1000 units twice weekly       Calcium Carbonate-Vitamin D (CALCIUM-VITAMIN D3) 600-3.125 MG-MCG TABS Take 1 capsule by mouth 2 times daily (Patient not taking: Reported on 5/8/2024)       No  current facility-administered medications for this visit.        Physical Exam    Failed [facility] redirect [facility].    General: alert and cooperative  Breast status post left lumpectomy.  Scar healed well.  No new masses noted in either breast.  Mild lymphedema on the right breast.  No evidence of bilateral axillary adenopathy.  CNS: Alert and oriented x3, neurologic exam grossly normal.    Lab Results    No results found for this or any previous visit (from the past week).        Imaging    No results found.    The longitudinal plan of care for the diagnosis(es)/condition(s) as documented were addressed during this visit. Due to the added complexity in care, I will continue to support Marina in the subsequent management and with ongoing continuity of care.      A total of 40 min was spent today on this visit which included face to face conversation with the patient, EMR review, counseling and co-ordination of care and medical documentation.        Signed by: Cory Carrasco MD    Again, thank you for allowing me to participate in the care of your patient.        Sincerely,        Cory Carrasco MD    Electronically signed

## 2025-05-20 PROBLEM — M81.0 AGE-RELATED OSTEOPOROSIS WITHOUT CURRENT PATHOLOGICAL FRACTURE: Status: ACTIVE | Noted: 2025-05-20

## 2025-05-20 NOTE — PROGRESS NOTES
[facility]ematology and Oncology Progress Note    Patient: Cordelia CABALLERO Parent  MRN: [id number]  5/09/25        Reason for Visit    Chief Complaint   Patient presents with    Oncology Clinic Visit     4 month follow up         Problem List Items Addressed This Visit       Invasive ductal carcinoma of breast, stage 1, right (H) - Primary     Other Visit Diagnoses         Musculoskeletal pain                          Assessment and Plan  Early right-sided hormone receptor positive and HR negative breast cancer, status postlumpectomy and adjuvant radiation  T2N0.  Pathologic prognostic stage Ia.  Oncotype DX 23.  Started adjuvant letrozole in July-August 2023.      Doing well on letrozole.      Results  - Mammogram in February: Report was good.  - Blood test in March:  - Cholesterol was high.  - Glucose was high but not diabetic.  - Calcium was slightly high at 10.5 (should have been 10).  - Vitamin D was high.  - Creatinine was slightly high at 0.96.  - Eye tests: All results were normal.  - Electrocardiogram: Showed a little irregularity, but not concerning.  - MRI/MRA: Six MRIs performed, results were normal.  - Red and white blood cell counts in March: Normal.  - MRI for back: Results showed thoracic and lumbar scoliosis, stenosis near vertebrae four and five, lumbar spondylosis with radiculopathy, all mild.  - I reviewed lab results personally and independently interpreted the results.      Plan  Invasive ductal carcinoma of breast, stage 1, right (H):  No new concerns regarding the breast cancer. Recent mammogram report was good, and there is no indication of metastasis to the bones or muscles.  Radiation dermatitis noted..  Continue letrozole with periodic physical exam.  Risks and side effects: Radiation to the breast can increase the risk of angiosarcoma, a rare blood vessel cancer, many years later.    Osteoporosis  Previously I had strongly recommended considering bisphosphonate therapy.  But she has  declined it.  Currently on vitamin D and calcium supplementation.  Continue weightbearing exercises.  Will need repeat DEXA scan sometime this year.    Musculoskeletal pain:  Musculoskeletal pain likely related to letrozole, which can cause joint and muscle aches. Previous injuries and lack of use may contribute to stiffness and pain. Physical therapy has been beneficial.  Continue physical therapy exercises. Follow physical therapist's recommendations regarding weight lifting. No further imaging unless advised by an orthopedist.      Cancer Staging   Invasive ductal carcinoma of breast, stage 1, right (H)  Staging form: Breast, AJCC 8th Edition  - Pathologic: Stage IA (pT2, pN0, cM0, G2, ER+, AZ+, HER2-) - Signed by Cheyenne Jaimes MD on 4/12/2023      ECOG Performance    1 - Can't do physically strenuous work, but fully ambyulatory and can do light sedentary work         Problem List    Patient Active Problem List   Diagnosis    Vertigo    Other specified phobia    Invasive ductal carcinoma of breast, stage 1, right (H)            Interval History   Cordelia Thorne is a 79 year old female with early-stage right-sided hormone receptor positive and HER2 negative breast cancer status postlumpectomy and adjuvant radiation who is currently on endocrine therapy with letrozole is here for follow-up.    Doing well on letrozole.     She reported several health concerns and updates since her last visit. She mentioned experiencing hair thinning, which she suspects may be a side effect of letrozole. She had a mammogram in February, which yielded a good report, alleviating some of her anxiety. In March, a blood test revealed high cholesterol levels, despite her healthy diet, and slightly elevated glucose levels, though not diabetic. Her calcium and vitamin D levels were also high, prompting her to adjust her vitamin D intake. She expressed concern about the potential for calcium deposits in the breast but was reassured that  this is not necessarily the case.    Marina recounted an episode in mid-November where she experienced temporary vision loss in one eye, accompanied by flashing lights. This led to a series of tests, including MRIs and an echocardiogram, which showed a slight irregularity but were otherwise normal. She also described a painful experience during the MRI due to the contrast dye, which caused a burning sensation.    She has been dealing with a trigger finger in her left hand, diagnosed after carrying weights, which was treated with a cortisone injection. This condition, along with past injuries to her shoulder and collarbone, has limited her arm's mobility. Recently, she has experienced shoulder pain, particularly at night, which she fears might be related to cancer metastasis, though she was reassured that it is likely due to past injuries and muscle stiffness.    Marina also mentioned a radiation burn from previous treatment, which she was told could slightly increase the risk of a rare blood vessel cancer, angiosarcoma, many years later. She has been undergoing physical therapy for thoracic and lumbar scoliosis, stenosis, and lumbar spondylosis, which has alleviated her leg pain. She attributes some of her joint and muscle pain to letrozole, which she started two years ago. Despite these challenges, she continues to engage in physical therapy exercises, which have been beneficial. Additionally, she noted gaining weight, possibly due to letrozole, and expressed a desire to increase her physical activity.       Review of Systems  A comprehensive review of systems was negative except for what is noted in the interval history    Current Outpatient Medications   Medication Sig Dispense Refill    Ascorbic Acid (VITAMIN C) 500 MG CAPS Take 1 capsule by mouth daily      CALCIUM CITRATE PO Take 4 tablets by mouth 2 times daily      Lactobacillus rhamnosus GG (CULTURELLE) 15 billion cell CpSP [LACTOBACILLUS RHAMNOSUS GG  (CULTURELLE) 15 BILLION CELL CPSP] Take 1 capsule by mouth daily.      letrozole (FEMARA) 2.5 MG tablet Take 1 tablet (2.5 mg) by mouth daily. 90 tablet 0    MAGNESIUM GLYCINATE PO Take 1 capsule by mouth daily      Vitamin D (Cholecalciferol) 50 MCG (2000 UT) CAPS Take 2,000 Units by mouth. 2000 units daily with an additional 1000 units twice weekly      Calcium Carbonate-Vitamin D (CALCIUM-VITAMIN D3) 600-3.125 MG-MCG TABS Take 1 capsule by mouth 2 times daily (Patient not taking: Reported on 5/8/2024)       No current facility-administered medications for this visit.        Physical Exam    Failed to redirect to the Timeline version of the [facility] SmartLink.    General: alert and cooperative  Breast status post left lumpectomy.  Scar healed well.  No new masses noted in either breast.  Mild lymphedema on the right breast.  No evidence of bilateral axillary adenopathy.  CNS: Alert and oriented x3, neurologic exam grossly normal.    Lab Results    No results found for this or any previous visit (from the past week).        Imaging    No results found.    The longitudinal plan of care for the diagnosis(es)/condition(s) as documented were addressed during this visit. Due to the added complexity in care, I will continue to support Marina in the subsequent management and with ongoing continuity of care.      A total of 40 min was spent today on this visit which included face to face conversation with the patient, EMR review, counseling and co-ordination of care and medical documentation.        Signed by: Cory Carrasco MD

## 2025-06-30 DIAGNOSIS — C50.911 INVASIVE DUCTAL CARCINOMA OF BREAST, STAGE 1, RIGHT (H): ICD-10-CM

## 2025-06-30 RX ORDER — LETROZOLE 2.5 MG/1
2.5 TABLET, FILM COATED ORAL DAILY
Qty: 90 TABLET | Refills: 1 | Status: SHIPPED | OUTPATIENT
Start: 2025-06-30

## 2025-06-30 NOTE — TELEPHONE ENCOUNTER
Therapy started July- August 2023    Last Written Prescription Date:  4/2/25  Last Fill Quantity: 90,  # refills: 0   Last office visit provider:  5/9/25 with Dr. Carrasco     Requested Prescriptions   Pending Prescriptions Disp Refills    letrozole (FEMARA) 2.5 MG tablet 90 tablet 0     Sig: Take 1 tablet (2.5 mg) by mouth daily.       There is no refill protocol information for this order          Opal Snider RN 06/30/25 1:51 PM

## 2025-08-27 DIAGNOSIS — M81.0 OSTEOPOROSIS: Primary | ICD-10-CM

## 2025-08-27 DIAGNOSIS — R73.03 PREDIABETES: ICD-10-CM
